# Patient Record
Sex: FEMALE | Race: WHITE | Employment: OTHER | URBAN - METROPOLITAN AREA
[De-identification: names, ages, dates, MRNs, and addresses within clinical notes are randomized per-mention and may not be internally consistent; named-entity substitution may affect disease eponyms.]

---

## 2018-02-21 PROBLEM — E11.21 TYPE 2 DIABETES WITH NEPHROPATHY (HCC): Status: ACTIVE | Noted: 2018-02-21

## 2018-04-10 PROBLEM — Z00.00 PREVENTATIVE HEALTH CARE: Status: ACTIVE | Noted: 2018-04-10

## 2018-06-25 ENCOUNTER — HOSPITAL ENCOUNTER (OUTPATIENT)
Dept: GENERAL RADIOLOGY | Age: 63
Discharge: HOME | End: 2018-06-25
Payer: COMMERCIAL

## 2018-06-25 DIAGNOSIS — R26.89 DECREASED MOBILITY: ICD-10-CM

## 2018-06-25 DIAGNOSIS — R60.9 EDEMA: ICD-10-CM

## 2018-06-25 DIAGNOSIS — R52 PAIN: ICD-10-CM

## 2018-06-25 PROCEDURE — 73560 X-RAY EXAM OF KNEE 1 OR 2: CPT

## 2018-06-25 PROCEDURE — 73502 X-RAY EXAM HIP UNI 2-3 VIEWS: CPT

## 2018-09-06 PROBLEM — D22.5 MELANOCYTIC NEVUS OF TRUNK: Status: ACTIVE | Noted: 2018-09-06

## 2018-10-31 PROBLEM — M17.0 OSTEOARTHRITIS OF BOTH KNEES: Status: ACTIVE | Noted: 2018-10-31

## 2019-01-16 ENCOUNTER — HOSPITAL ENCOUNTER (OUTPATIENT)
Dept: SURGERY | Age: 64
Discharge: HOME | End: 2019-01-16
Payer: COMMERCIAL

## 2019-01-16 VITALS
BODY MASS INDEX: 27.56 KG/M2 | HEART RATE: 71 BPM | RESPIRATION RATE: 18 BRPM | HEIGHT: 61 IN | WEIGHT: 146 LBS | SYSTOLIC BLOOD PRESSURE: 174 MMHG | OXYGEN SATURATION: 95 % | TEMPERATURE: 97.4 F | DIASTOLIC BLOOD PRESSURE: 79 MMHG

## 2019-01-16 LAB
ANION GAP SERPL CALC-SCNC: 4 MMOL/L
APPEARANCE UR: CLEAR
APTT PPP: 37 SEC (ref 24.7–39.8)
BACTERIA SPEC CULT: ABNORMAL
BACTERIA URNS QL MICRO: 0 /HPF
BASOPHILS # BLD: 0.1 K/UL (ref 0–0.2)
BASOPHILS NFR BLD: 0 % (ref 0–2)
BILIRUB UR QL: NEGATIVE
BUN SERPL-MCNC: 9 MG/DL (ref 8–23)
CALCIUM SERPL-MCNC: 9.2 MG/DL (ref 8.3–10.4)
CASTS URNS QL MICRO: ABNORMAL /LPF
CHLORIDE SERPL-SCNC: 97 MMOL/L (ref 98–107)
CO2 SERPL-SCNC: 35 MMOL/L (ref 21–32)
COLOR UR: YELLOW
CREAT SERPL-MCNC: 0.6 MG/DL (ref 0.6–1)
DIFFERENTIAL METHOD BLD: ABNORMAL
EOSINOPHIL # BLD: 0.2 K/UL (ref 0–0.8)
EOSINOPHIL NFR BLD: 1 % (ref 0.5–7.8)
EPI CELLS #/AREA URNS HPF: ABNORMAL /HPF
ERYTHROCYTE [DISTWIDTH] IN BLOOD BY AUTOMATED COUNT: 13.5 % (ref 11.9–14.6)
GLUCOSE SERPL-MCNC: 149 MG/DL (ref 65–100)
GLUCOSE UR STRIP.AUTO-MCNC: NEGATIVE MG/DL
HCT VFR BLD AUTO: 42.3 % (ref 35.8–46.3)
HGB BLD-MCNC: 13.8 G/DL (ref 11.7–15.4)
HGB UR QL STRIP: ABNORMAL
IMM GRANULOCYTES # BLD AUTO: 0 K/UL (ref 0–0.5)
IMM GRANULOCYTES NFR BLD AUTO: 0 % (ref 0–5)
INR PPP: 1
KETONES UR QL STRIP.AUTO: NEGATIVE MG/DL
LEUKOCYTE ESTERASE UR QL STRIP.AUTO: ABNORMAL
LYMPHOCYTES # BLD: 3.4 K/UL (ref 0.5–4.6)
LYMPHOCYTES NFR BLD: 25 % (ref 13–44)
MCH RBC QN AUTO: 28.9 PG (ref 26.1–32.9)
MCHC RBC AUTO-ENTMCNC: 32.6 G/DL (ref 31.4–35)
MCV RBC AUTO: 88.5 FL (ref 79.6–97.8)
MONOCYTES # BLD: 0.6 K/UL (ref 0.1–1.3)
MONOCYTES NFR BLD: 5 % (ref 4–12)
NEUTS SEG # BLD: 9.3 K/UL (ref 1.7–8.2)
NEUTS SEG NFR BLD: 68 % (ref 43–78)
NITRITE UR QL STRIP.AUTO: NEGATIVE
NRBC # BLD: 0 K/UL (ref 0–0.2)
PH UR STRIP: 6.5 [PH] (ref 5–9)
PLATELET # BLD AUTO: 431 K/UL (ref 150–450)
PMV BLD AUTO: 8.2 FL (ref 9.4–12.3)
POTASSIUM SERPL-SCNC: 3.6 MMOL/L (ref 3.5–5.1)
PROT UR STRIP-MCNC: 30 MG/DL
PROTHROMBIN TIME: 13 SEC (ref 11.7–14.5)
RBC # BLD AUTO: 4.78 M/UL (ref 4.05–5.2)
RBC #/AREA URNS HPF: ABNORMAL /HPF
SERVICE CMNT-IMP: ABNORMAL
SODIUM SERPL-SCNC: 136 MMOL/L (ref 136–145)
SP GR UR REFRACTOMETRY: 1.01 (ref 1–1.02)
UROBILINOGEN UR QL STRIP.AUTO: 1 EU/DL (ref 0.2–1)
WBC # BLD AUTO: 13.5 K/UL (ref 4.3–11.1)
WBC URNS QL MICRO: ABNORMAL /HPF

## 2019-01-16 PROCEDURE — 85610 PROTHROMBIN TIME: CPT

## 2019-01-16 PROCEDURE — 81001 URINALYSIS AUTO W/SCOPE: CPT

## 2019-01-16 PROCEDURE — 77030027138 HC INCENT SPIROMETER -A

## 2019-01-16 PROCEDURE — 85025 COMPLETE CBC W/AUTO DIFF WBC: CPT

## 2019-01-16 PROCEDURE — 80048 BASIC METABOLIC PNL TOTAL CA: CPT

## 2019-01-16 PROCEDURE — 85730 THROMBOPLASTIN TIME PARTIAL: CPT

## 2019-01-16 PROCEDURE — 87641 MR-STAPH DNA AMP PROBE: CPT

## 2019-01-16 RX ORDER — CYCLOBENZAPRINE HCL 10 MG
10 TABLET ORAL AS NEEDED
COMMUNITY
End: 2019-03-19 | Stop reason: SDUPTHER

## 2019-01-16 RX ORDER — LEVOCETIRIZINE DIHYDROCHLORIDE 5 MG/1
5 TABLET, FILM COATED ORAL
COMMUNITY
End: 2019-11-27 | Stop reason: SDUPTHER

## 2019-01-16 NOTE — PERIOP NOTES
Patient verified name and . Order for consent  found in EHR and matches case posting; patient verified. Bernadine Elliott, NP looked at open area on pt's left arm-gave instructions for using bactroban, keeping it covered. Type 3 surgery, Joint assessment complete. Labs per surgeon: cbc,bmp,pt,ptt,ua,mssa ; results pending. Labs per anesthesia protocol: no other needed. EKG: dated 10-31-18-within anesthesia limits. MRSA/MSSA swab collected; pharmacy to review and dose antibiotic as appropriate. Hibiclens and instructions to return bottle on DOS given per hospital policy. Patient provided with handouts including Guide to Surgery, Pain Management, Hand Hygiene, Blood Transfusion Education, and [facility] Brochure. Patient answered medical/surgical history questions at their best of ability. All prior to admission medications documented in [facility]. Original medication prescription bottle not visualized during patient appointment. Patient instructed to hold all vitamins 7 days prior to surgery and NSAIDS 5 days prior to surgery. Medications to be held: none. Patient instructed to continue previous medications as prescribed prior to surgery and to take the following medications the day of surgery according to anesthesia guidelines with a small sip of water: aspirin 81 mg, amlodipine, pantoprazole, alprazolam, oxycodone if needed, flexeril if needed, use albuterol inhaler. Patient teach back successful and patient demonstrates knowledge of instruction.

## 2019-01-17 NOTE — ADVANCED PRACTICE NURSE
Total Joint Surgery Preoperative Chart Review Patient ID: 
Lazaro Allison 
[id number] 
89 y.o. 
1955 Surgeon: Dr. Adalberto Jernigan Date of Surgery: 1/24/2019 Procedure: Total Left Knee Arthroplasty Primary Care Physician: Eli Curtis [phone] Specialty Physician(s):   
 
Subjective:  
Lazaro Allison is a 58 y.o. WHITE OR  female who presents for preoperative evaluation for Total Left Knee arthroplasty. This is a preoperative chart review note based on data collected by the nurse at the surgical Pre-Assessment visit. Past Medical History:  
Diagnosis Date  Angina   
 at rest in 3/2010 potassium low; denies currently  Arthritis   
 does not know kind-sees rheumatologist/ spine/ LLE/ L ft/toes; pt reports psoriatic  Bronchitis ? Aug 2013  
 no hospitalization  CAD (coronary artery disease) 2011 EF= 65 %  left anterior descending coronary stenosis is 30 %. , normal lv function  Cancer [facility])   
 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   
 spine-has congenital scoliosis-R knee  COPD   
 COPD (chronic obstructive pulmonary disease) ([facility] [address] 12/13/13  
 no use of rescue inhaler x 3 wks; denies ABDELRAHMAN  Diabetes ([facility] [address] type 2 insulin dep x 2 yrs;avg fasting BS-118-205;S/S hypo @ 90  GERD (gastroesophageal reflux disease)   
 controlled with protonix  History of colon cancer 3/15/2013 S/p partial colectomy  Hypertension   
 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  
 removed by Dr. Lisa Eaton  Mixed hyperlipidemia 3/15/2013  
 intolerant to medications  Nicotine vapor product user  Psychiatric disorder   
 depression and anxiety no med  PUD (peptic ulcer disease)   
 gastric  Renal insufficiency 3/15/2013 Has seen Dr. Jason Kumari in the past.  
 Scoliosis, congenital   
 Thyroid nodule 3/15/2013  Type II or unspecified type diabetes mellitus without mention of complication, not stated as uncontrolled 3/15/2013 Is adjusting insulin at this time. Past Surgical History:  
Procedure Laterality Date  ABDOMEN SURGERY PROC UNLISTED  1996  
 colon resection  HX BACK SURGERY  2005  
 spinal cord implant  HX BACK SURGERY  2005  
 spinal cord implant removal  
 HX BACK SURGERY  2005  
 nerve embedded in muscle-resection R wrist  
 HX CERVICAL FUSION  10/11  
 w/ discectomy and cage - Dr. Louise Pruitt [address] [facility] [address]  HX HERNIA REPAIR  1997  
 hernia repair  HX KNEE REPLACEMENT  5/11  
 partial - Dr. Shannon Spicer  HX LUMBAR FUSION  1974 or 75  
 spinal fusion lumbar  HX OOPHORECTOMY  1974  
 left partial  
 HX CHRISTIAN AND BSO  1995 Family History Problem Relation Age of Onset  Alcohol abuse Father  Alcohol abuse Mother  Lung Disease Sister  Heart Disease Sister  Lung Disease Sister  Heart Disease Brother  Diabetes Paternal Grandfather Social History Tobacco Use  Smoking status: Former Smoker Packs/day: 1.00 Years: 40.00 Pack years: 40.00 Types: Cigarettes  Smokeless tobacco: Former User Quit date: 9/16/2018  Tobacco comment: quit Substance Use Topics  Alcohol use: No  
   
Prior to Admission medications Medication Sig Start Date End Date Taking? Authorizing Provider  
cyclobenzaprine (FLEXERIL) 10 mg tablet Take 10 mg by mouth as needed for Muscle Spasm(s). Yes Provider, Historical  
levocetirizine (XYZAL) 5 mg tablet Take  by mouth nightly. Yes Provider, Historical  
albuterol (PROAIR HFA) 90 mcg/actuation inhaler Take 2 Puffs by inhalation every six (6) hours as needed for Wheezing. 12/20/18   Charlotte Freedman MD  
traZODone (DESYREL) 50 mg tablet 1-3 tabs at bedtime.  12/20/18   Charlotte Freedman MD  
metoprolol succinate (TOPROL-XL) 50 mg XL tablet TAKE 1 TABLET BY MOUTH EVERY DAY 
 Patient taking differently: nightly. TAKE 1 TABLET BY MOUTH EVERY DAY 12/5/18   Vashti Heimlich, MD  
losartan-hydroCHLOROthiazide [facility]) 50-12.5 mg per tablet 1 by mouth daily at bedtime for blood pressure Instead of the Benicar HCTZ 12/5/18   Vashti Heimlich, MD  
promethazine (PHENERGAN) 25 mg tablet Take 1 Tab by mouth every six (6) hours as needed. 12/5/18   Vashti Heimlich, MD  
pantoprazole (PROTONIX) 40 mg tablet Take 1 Tab by mouth daily. For stomach - instead of prilosec( failed priolosec and nexium and prevacid) 12/5/18   Km Schrader MD  
amLODIPine (NORVASC) 5 mg tablet Take 1 Tab by mouth daily. ( instead of tribenzor with benicar hct) 12/5/18   Vashti Heimlich, MD  
chlorhexidine (HIBICLENS) 4 % liquid clean entire body - chin to feet, then  head - face x 3 d. repeat 3 times a week x3wk 12/5/18   Vashti Heimlich, MD  
mupirocin calcium (BACTROBAN) 2 % topical cream Apply  to affected area two (2) times a day. 12/5/18   Vashti Heimlich, MD  
ALPRAZolam [facility]cca Inches) 1 mg tablet Take 1 Tab by mouth three (3) times daily as needed for Anxiety. 11/19/18   Vashti Heimlich, MD  
insulin degludec (TRESIBA FLEXTOUCH U-200) 200 unit/mL (3 mL) inpn 62 units sq daily ( 18 pens for a 90 day supply) Patient taking differently: daily. 62 units sq daily ( 18 pens for a 90 day supply) 4/11/18   Km Schrader MD  
multivitamin with iron (DAILY MULTI-VITAMINS/IRON) tablet Take 1 Tab by mouth daily. Provider, Historical  
Insulin Needles, Disposable, (ADVOCATE PEN NEEDLE) 31 gauge x 5/16\" ndle Use TID - QID for lantus, victoza, and apidra  (#300)  Dx: E11.9 DM 9/11/17   Vashti Heimlich, MD  
lancets 30 gauge misc 1 Each by SubCUTAneous route daily. Test BS bid either fasting or 2 h after eating dx:250.00 9/11/17   Vashti Heimlich, MD  
oxyCODONE IR (OXY-IR) 15 mg immediate release tablet Take 15 mg by mouth four (4) times daily as needed for Pain. Per Dr Manisha Fulton.     Provider, Historical  
 Aspirin, Buffered 81 mg tab Take  by mouth daily. Provider, Historical  
morphine CR (MS CONTIN) 60 mg CR tablet every eight (8) hours as needed. 1/2/15   Provider, Historical  
 
Allergies Allergen Reactions  Latex Rash Skin raw  Adhesive Other (comments) Bruising, burning  Arthrotec 50 [Diclofenac-Misoprostol] Nausea Only  Chantix [Varenicline] Other (comments) \"I had violent outbursts\"  Neurontin [Gabapentin] Palpitations  Nsaids (Non-Steroidal Anti-Inflammatory Drug) Other (comments) Not to take due to kidneys blood and pus in urine.  Requip [Ropinirole] Other (comments) Keep pt awake  Tylenol [Acetaminophen] Other (comments) Bothers her liver or kidneys Objective:  
 
Physical Exam:  
Patient Vitals for the past 24 hrs: 
 Temp Pulse Resp BP SpO2  
01/16/19 1419 97.4 °F (36.3 °C) 71 18 174/79 95 % ECG:   
EKG Results None Data Review:  
Labs:  
Recent Results (from the past 24 hour(s)) CBC WITH AUTOMATED DIFF Collection Time: 01/16/19  2:32 PM  
Result Value Ref Range WBC 13.5 (H) 4.3 - 11.1 K/uL  
 RBC 4.78 4.05 - 5.2 M/uL  
 HGB 13.8 11.7 - 15.4 g/dL HCT 42.3 35.8 - 46.3 % MCV 88.5 79.6 - 97.8 FL  
 MCH 28.9 26.1 - 32.9 PG  
 MCHC 32.6 31.4 - 35.0 g/dL  
 RDW 13.5 11.9 - 14.6 % PLATELET 887 763 - 120 K/uL MPV 8.2 (L) 9.4 - 12.3 FL ABSOLUTE NRBC 0.00 0.0 - 0.2 K/uL  
 DF AUTOMATED NEUTROPHILS 68 43 - 78 % LYMPHOCYTES 25 13 - 44 % MONOCYTES 5 4.0 - 12.0 % EOSINOPHILS 1 0.5 - 7.8 % BASOPHILS 0 0.0 - 2.0 % IMMATURE GRANULOCYTES 0 0.0 - 5.0 %  
 ABS. NEUTROPHILS 9.3 (H) 1.7 - 8.2 K/UL  
 ABS. LYMPHOCYTES 3.4 0.5 - 4.6 K/UL  
 ABS. MONOCYTES 0.6 0.1 - 1.3 K/UL  
 ABS. EOSINOPHILS 0.2 0.0 - 0.8 K/UL  
 ABS. BASOPHILS 0.1 0.0 - 0.2 K/UL  
 ABS. IMM. GRANS. 0.0 0.0 - 0.5 K/UL METABOLIC PANEL, BASIC Collection Time: 01/16/19  2:32 PM  
Result Value Ref Range  Sodium 136 136 - 145 mmol/L  
 Potassium 3.6 3.5 - 5.1 mmol/L Chloride 97 (L) 98 - 107 mmol/L  
 CO2 35 (H) 21 - 32 mmol/L Anion gap 4 mmol/L Glucose 149 (H) 65 - 100 mg/dL BUN 9 8 - 23 MG/DL Creatinine 0.60 0.6 - 1.0 MG/DL  
 GFR est AA >60 >60 ml/min/1.73m2 GFR est non-AA >60 ml/min/1.73m2 Calcium 9.2 8.3 - 10.4 MG/DL  
MSSA/MRSA SC BY PCR, NASAL SWAB Collection Time: 01/16/19  2:32 PM  
Result Value Ref Range Special Requests: NO SPECIAL REQUESTS Culture result: (A) MRSA target DNA not detected, SA target DNA detected. A MRSA negative, SA positive test result does not preclude MRSA nasal colonization. PROTHROMBIN TIME + INR Collection Time: 01/16/19  2:32 PM  
Result Value Ref Range Prothrombin time 13.0 11.7 - 14.5 sec INR 1.0    
PTT Collection Time: 01/16/19  2:32 PM  
Result Value Ref Range aPTT 37.0 24.7 - 39.8 SEC URINALYSIS W/ RFLX MICROSCOPIC Collection Time: 01/16/19  2:32 PM  
Result Value Ref Range Color YELLOW Appearance CLEAR Specific gravity 1.008 1.001 - 1.023    
 pH (UA) 6.5 5.0 - 9.0 Protein 30 (A) NEG mg/dL Glucose NEGATIVE  mg/dL Ketone NEGATIVE  NEG mg/dL Bilirubin NEGATIVE  NEG Blood TRACE (A) NEG Urobilinogen 1.0 0.2 - 1.0 EU/dL Nitrites NEGATIVE  NEG Leukocyte Esterase MODERATE (A) NEG    
 WBC 20-50 0 /hpf  
 RBC 0-3 0 /hpf Epithelial cells 0-3 0 /hpf Bacteria 0 0 /hpf Casts 0-3 0 /lpf Problem List: 
) Patient Active Problem List  
Diagnosis Code  Hypertension I10  
 GERD (gastroesophageal reflux disease) K21.9  PUD (peptic ulcer disease) K27.9  Insomnia G47.00  Arthritis M19.90  Chronic pain G89.29  
 CAD (coronary artery disease) I25.10  Scoliosis, congenital Q67.5  Renal insufficiency N28.9  Type II or unspecified type diabetes mellitus without mention of complication, not stated as uncontrolled E11.9  History of colon cancer Z85.038  
  Mixed hyperlipidemia E78.2  Thyroid nodule E04.1  COPD (chronic obstructive pulmonary disease) ([facility]) J44.9  Urethral stenosis HVD3302  Leiomyosarcoma of arm ([facility]) C49.10  Falls frequently R29.6  Back pain M54.9  Chronic narcotic dependence ([address] F11.20  DM (diabetes mellitus) ([facility] [address] E11.9  Leukocytosis D72.829  
 Bacteremia R78.81  
 HTN (hypertension) I10  
 Hypokalemia E87.6  MDD (major depressive disorder) F32.9  Constipation K59.00  Type 2 diabetes mellitus without complication ([facility]) U35.1  Major depressive disorder, recurrent episode, moderate ([facility]) F33.1  Depression F32.9  Tremor R25.1  Edema of both legs R60.0  Post-traumatic osteoarthritis of right knee H76.07  
 Umbilical hernia without obstruction and without gangrene K42.9  Type 2 diabetes with nephropathy ([facility] [address] E11.21  
 Preventative health care Z00.00  Cancer ([facility] [address] C80.1  Melanocytic nevus of trunk D22.5  Osteoarthritis of both knees M17.0 Total Joint Surgery Pre-Assessment Recommendations:    Patient with chronic leukocytosis since 2011. Patient was evaluated for wound to left lower FA without signs of infection. Recommend continuous saturation monitoring hours of sleep, during hospitalization. Albuterol every 6 hours as need during hospitalization. Signed By: John Terrazas NP-C  
 January 17, 2019

## 2019-01-17 NOTE — PERIOP NOTES
Reviewed labs. Noted abnormal WBC. Notified Hema Walsh NP for [facility].(see saw pt during [address] 1/16/19). Recent Results (from the past 24 hour(s)) CBC WITH AUTOMATED DIFF Collection Time: 01/16/19  2:32 PM  
Result Value Ref Range WBC 13.5 (H) 4.3 - 11.1 K/uL  
 RBC 4.78 4.05 - 5.2 M/uL  
 HGB 13.8 11.7 - 15.4 g/dL HCT 42.3 35.8 - 46.3 % MCV 88.5 79.6 - 97.8 FL  
 MCH 28.9 26.1 - 32.9 PG  
 MCHC 32.6 31.4 - 35.0 g/dL  
 RDW 13.5 11.9 - 14.6 % PLATELET 781 119 - 657 K/uL MPV 8.2 (L) 9.4 - 12.3 FL ABSOLUTE NRBC 0.00 0.0 - 0.2 K/uL  
 DF AUTOMATED NEUTROPHILS 68 43 - 78 % LYMPHOCYTES 25 13 - 44 % MONOCYTES 5 4.0 - 12.0 % EOSINOPHILS 1 0.5 - 7.8 % BASOPHILS 0 0.0 - 2.0 % IMMATURE GRANULOCYTES 0 0.0 - 5.0 %  
 ABS. NEUTROPHILS 9.3 (H) 1.7 - 8.2 K/UL  
 ABS. LYMPHOCYTES 3.4 0.5 - 4.6 K/UL  
 ABS. MONOCYTES 0.6 0.1 - 1.3 K/UL  
 ABS. EOSINOPHILS 0.2 0.0 - 0.8 K/UL  
 ABS. BASOPHILS 0.1 0.0 - 0.2 K/UL  
 ABS. IMM. GRANS. 0.0 0.0 - 0.5 K/UL METABOLIC PANEL, BASIC Collection Time: 01/16/19  2:32 PM  
Result Value Ref Range Sodium 136 136 - 145 mmol/L Potassium 3.6 3.5 - 5.1 mmol/L Chloride 97 (L) 98 - 107 mmol/L  
 CO2 35 (H) 21 - 32 mmol/L Anion gap 4 mmol/L Glucose 149 (H) 65 - 100 mg/dL BUN 9 8 - 23 MG/DL Creatinine 0.60 0.6 - 1.0 MG/DL  
 GFR est AA >60 >60 ml/min/1.73m2 GFR est non-AA >60 ml/min/1.73m2 Calcium 9.2 8.3 - 10.4 MG/DL  
MSSA/MRSA SC BY PCR, NASAL SWAB Collection Time: 01/16/19  2:32 PM  
Result Value Ref Range Special Requests: NO SPECIAL REQUESTS Culture result: (A) MRSA target DNA not detected, SA target DNA detected. A MRSA negative, SA positive test result does not preclude MRSA nasal colonization. PROTHROMBIN TIME + INR Collection Time: 01/16/19  2:32 PM  
Result Value Ref Range Prothrombin time 13.0 11.7 - 14.5 sec INR 1.0    
PTT  Collection Time: 01/16/19  2:32 PM  
 Result Value Ref Range aPTT 37.0 24.7 - 39.8 SEC URINALYSIS W/ RFLX MICROSCOPIC Collection Time: 01/16/19  2:32 PM  
Result Value Ref Range Color YELLOW Appearance CLEAR Specific gravity 1.008 1.001 - 1.023    
 pH (UA) 6.5 5.0 - 9.0 Protein 30 (A) NEG mg/dL Glucose NEGATIVE  mg/dL Ketone NEGATIVE  NEG mg/dL Bilirubin NEGATIVE  NEG Blood TRACE (A) NEG Urobilinogen 1.0 0.2 - 1.0 EU/dL Nitrites NEGATIVE  NEG Leukocyte Esterase MODERATE (A) NEG    
 WBC 20-50 0 /hpf  
 RBC 0-3 0 /hpf Epithelial cells 0-3 0 /hpf Bacteria 0 0 /hpf Casts 0-3 0 /lpf

## 2019-01-18 NOTE — H&P
[id number] [facility]  History and Physical Exam    Patient ID:  Yue Hill  [id number]    40 y.o.  1955    Today: January 18, 2019    Vitals Signs: Reviewed as noted in medical record. Allergies: Allergies   Allergen Reactions    Latex Rash     Skin raw    Adhesive Other (comments)     Bruising, burning    Arthrotec 50 [Diclofenac-Misoprostol] Nausea Only    Chantix [Varenicline] Other (comments)     \"I had violent outbursts\"    Neurontin [Gabapentin] Palpitations    Nsaids (Non-Steroidal Anti-Inflammatory Drug) Other (comments)     Not to take due to kidneys blood and pus in urine.  Requip [Ropinirole] Other (comments)     Keep pt awake    Tylenol [Acetaminophen] Other (comments)     Bothers her liver or kidneys       CC: left knee pain    HPI:  Pt complains of left knee pain and with difficulty ambulating. Relevant PMH:   Past Medical History:   Diagnosis Date    Angina     at rest in 3/2010 potassium low; denies currently    Arthritis     does not know kind-sees rheumatologist/ spine/ LLE/ L ft/toes; pt reports psoriatic    Bronchitis ? Aug 2013    no hospitalization    CAD (coronary artery disease) 2011    EF= 65 %  left anterior descending coronary stenosis is 30 %. 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    Chronic pain     spine-has congenital scoliosis-R knee    COPD     COPD (chronic obstructive pulmonary disease) ([facility]) 12/13/13    no use of rescue inhaler x 3 wks; denies ABDELRAHMAN    Diabetes ([facility]     type 2 insulin dep x 2 yrs;avg fasting BS-118-205;S/S hypo @ 80    GERD (gastroesophageal reflux disease)     controlled with protonix    History of colon cancer 3/15/2013    S/p partial colectomy    Hypertension     controlled with med    Insomnia     Leiomyosarcoma of arm ([facility] [address] 1998    removed by Dr. Walker Romero Mixed hyperlipidemia 3/15/2013    intolerant to medications    Nicotine vapor product user     Psychiatric disorder     depression and anxiety no med    PUD (peptic ulcer disease)     gastric    Renal insufficiency 3/15/2013    Has seen Dr. Chantale Graves in the past.    Scoliosis, congenital     Thyroid nodule 3/15/2013    Type II or unspecified type diabetes mellitus without mention of complication, not stated as uncontrolled 3/15/2013      Is adjusting insulin at this time. Objective:                    HEENT: NC/AT                   Lungs:  clear                   Heart:   rrr                   Abdomen: soft                   Extremities:  Pain with rom of the left knee pain    Radiographs: reveal osteoarthritis with loss of joint space and bone spurs. Assessment: Unilateral primary osteoarthritis, left knee [M17.12]    Plan:  Proceed with scheduled Procedure(s) (LRB):  LEFT KNEE ARTHROPLASTY TOTAL / MANDIE (Left) . The patient has failed conservative treatment including NSAIDS, and injections. Due to the amount of pain the patient is experiencing we will proceed with scheduled procedure. It is also felt that the patient is high risk for postoperative complications due to history of multiple chronic medical problems.       Signed By: PALOMA Aguila  January 18, 2019

## 2019-01-23 ENCOUNTER — ANESTHESIA EVENT (OUTPATIENT)
Dept: SURGERY | Age: 64
DRG: 470 | End: 2019-01-23
Payer: COMMERCIAL

## 2019-01-24 ENCOUNTER — HOSPITAL ENCOUNTER (INPATIENT)
Age: 64
LOS: 1 days | Discharge: HOME HEALTH | DRG: 470 | End: 2019-01-25
Attending: ORTHOPAEDIC SURGERY | Admitting: ORTHOPAEDIC SURGERY
Payer: COMMERCIAL

## 2019-01-24 ENCOUNTER — ANESTHESIA (OUTPATIENT)
Dept: SURGERY | Age: 64
DRG: 470 | End: 2019-01-24
Payer: COMMERCIAL

## 2019-01-24 ENCOUNTER — HOME HEALTH ADMISSION (OUTPATIENT)
Dept: HOME HEALTH SERVICES | Facility: HOME HEALTH | Age: 64
End: 2019-01-24
Payer: COMMERCIAL

## 2019-01-24 PROBLEM — M17.12 ARTHRITIS OF LEFT KNEE: Status: ACTIVE | Noted: 2019-01-24

## 2019-01-24 LAB
ABO + RH BLD: NORMAL
BLOOD GROUP ANTIBODIES SERPL: NORMAL
GLUCOSE BLD STRIP.AUTO-MCNC: 321 MG/DL (ref 65–100)
GLUCOSE BLD STRIP.AUTO-MCNC: 92 MG/DL (ref 65–100)
HGB BLD-MCNC: 12.4 G/DL (ref 11.7–15.4)
SPECIMEN EXP DATE BLD: NORMAL

## 2019-01-24 PROCEDURE — 77030002933 HC SUT MCRYL J&J -A: Performed by: ORTHOPAEDIC SURGERY

## 2019-01-24 PROCEDURE — C1713 ANCHOR/SCREW BN/BN,TIS/BN: HCPCS | Performed by: ORTHOPAEDIC SURGERY

## 2019-01-24 PROCEDURE — 77030013708 HC HNDPC SUC IRR PULS STRY –B: Performed by: ORTHOPAEDIC SURGERY

## 2019-01-24 PROCEDURE — 74011250636 HC RX REV CODE- 250/636

## 2019-01-24 PROCEDURE — 76010010054 HC POST OP PAIN BLOCK: Performed by: ORTHOPAEDIC SURGERY

## 2019-01-24 PROCEDURE — 74011000250 HC RX REV CODE- 250: Performed by: ORTHOPAEDIC SURGERY

## 2019-01-24 PROCEDURE — 77030020256 HC SOL INJ NACL 0.9%  500ML: Performed by: ORTHOPAEDIC SURGERY

## 2019-01-24 PROCEDURE — 76210000017 HC OR PH I REC 1.5 TO 2 HR: Performed by: ORTHOPAEDIC SURGERY

## 2019-01-24 PROCEDURE — 94760 N-INVAS EAR/PLS OXIMETRY 1: CPT

## 2019-01-24 PROCEDURE — 77030034696 HC CATH URETH FOL 2W BARD -A: Performed by: ORTHOPAEDIC SURGERY

## 2019-01-24 PROCEDURE — 77030006720 HC BLD PAT RMR ZIMM -B: Performed by: ORTHOPAEDIC SURGERY

## 2019-01-24 PROCEDURE — 74011250637 HC RX REV CODE- 250/637: Performed by: ORTHOPAEDIC SURGERY

## 2019-01-24 PROCEDURE — 77030002966 HC SUT PDS J&J -A: Performed by: ORTHOPAEDIC SURGERY

## 2019-01-24 PROCEDURE — 77030019557 HC ELECTRD VES SEAL MEDT -F: Performed by: ORTHOPAEDIC SURGERY

## 2019-01-24 PROCEDURE — 97165 OT EVAL LOW COMPLEX 30 MIN: CPT

## 2019-01-24 PROCEDURE — 77030031139 HC SUT VCRL2 J&J -A: Performed by: ORTHOPAEDIC SURGERY

## 2019-01-24 PROCEDURE — 74011250636 HC RX REV CODE- 250/636: Performed by: ORTHOPAEDIC SURGERY

## 2019-01-24 PROCEDURE — 77030011208: Performed by: ORTHOPAEDIC SURGERY

## 2019-01-24 PROCEDURE — 82962 GLUCOSE BLOOD TEST: CPT

## 2019-01-24 PROCEDURE — 77010033678 HC OXYGEN DAILY

## 2019-01-24 PROCEDURE — 74011250637 HC RX REV CODE- 250/637: Performed by: PHYSICIAN ASSISTANT

## 2019-01-24 PROCEDURE — 76010000162 HC OR TIME 1.5 TO 2 HR INTENSV-TIER 1: Performed by: ORTHOPAEDIC SURGERY

## 2019-01-24 PROCEDURE — 76210000021 HC REC RM PH II 0.5 TO 1 HR: Performed by: ORTHOPAEDIC SURGERY

## 2019-01-24 PROCEDURE — 65270000029 HC RM PRIVATE

## 2019-01-24 PROCEDURE — 97110 THERAPEUTIC EXERCISES: CPT

## 2019-01-24 PROCEDURE — 76060000034 HC ANESTHESIA 1.5 TO 2 HR: Performed by: ORTHOPAEDIC SURGERY

## 2019-01-24 PROCEDURE — 74011636637 HC RX REV CODE- 636/637: Performed by: HOSPITALIST

## 2019-01-24 PROCEDURE — 77030020782 HC GWN BAIR PAWS FLX 3M -B: Performed by: NURSE ANESTHETIST, CERTIFIED REGISTERED

## 2019-01-24 PROCEDURE — C1776 JOINT DEVICE (IMPLANTABLE): HCPCS | Performed by: ORTHOPAEDIC SURGERY

## 2019-01-24 PROCEDURE — 77030034849: Performed by: ORTHOPAEDIC SURGERY

## 2019-01-24 PROCEDURE — 77030008703 HC TU ET UNCUF COVD -A: Performed by: ANESTHESIOLOGY

## 2019-01-24 PROCEDURE — 85018 HEMOGLOBIN: CPT

## 2019-01-24 PROCEDURE — 74011000258 HC RX REV CODE- 258: Performed by: ORTHOPAEDIC SURGERY

## 2019-01-24 PROCEDURE — 77030008467 HC STPLR SKN COVD -B: Performed by: ORTHOPAEDIC SURGERY

## 2019-01-24 PROCEDURE — 74011000302 HC RX REV CODE- 302: Performed by: ORTHOPAEDIC SURGERY

## 2019-01-24 PROCEDURE — 0SRD0JZ REPLACEMENT OF LEFT KNEE JOINT WITH SYNTHETIC SUBSTITUTE, OPEN APPROACH: ICD-10-PCS | Performed by: ORTHOPAEDIC SURGERY

## 2019-01-24 PROCEDURE — 94762 N-INVAS EAR/PLS OXIMTRY CONT: CPT

## 2019-01-24 PROCEDURE — 77030006835 HC BLD SAW SAG STRY -B: Performed by: ORTHOPAEDIC SURGERY

## 2019-01-24 PROCEDURE — 76942 ECHO GUIDE FOR BIOPSY: CPT | Performed by: ORTHOPAEDIC SURGERY

## 2019-01-24 PROCEDURE — 74011250636 HC RX REV CODE- 250/636: Performed by: ANESTHESIOLOGY

## 2019-01-24 PROCEDURE — 74011000250 HC RX REV CODE- 250

## 2019-01-24 PROCEDURE — 77030012935 HC DRSG AQUACEL BMS -B: Performed by: ORTHOPAEDIC SURGERY

## 2019-01-24 PROCEDURE — 77030039425 HC BLD LARYNG TRULITE DISP TELE -A: Performed by: ANESTHESIOLOGY

## 2019-01-24 PROCEDURE — 77030003602 HC NDL NRV BLK BBMI -B: Performed by: NURSE ANESTHETIST, CERTIFIED REGISTERED

## 2019-01-24 PROCEDURE — 86900 BLOOD TYPING SEROLOGIC ABO: CPT

## 2019-01-24 PROCEDURE — 74011250636 HC RX REV CODE- 250/636: Performed by: PHYSICIAN ASSISTANT

## 2019-01-24 PROCEDURE — 74011250637 HC RX REV CODE- 250/637: Performed by: ANESTHESIOLOGY

## 2019-01-24 PROCEDURE — 36415 COLL VENOUS BLD VENIPUNCTURE: CPT

## 2019-01-24 PROCEDURE — 86580 TB INTRADERMAL TEST: CPT | Performed by: ORTHOPAEDIC SURGERY

## 2019-01-24 PROCEDURE — 77030018836 HC SOL IRR NACL ICUM -A: Performed by: ORTHOPAEDIC SURGERY

## 2019-01-24 PROCEDURE — 97161 PT EVAL LOW COMPLEX 20 MIN: CPT

## 2019-01-24 PROCEDURE — 77030008477 HC STYL SATN SLP COVD -A: Performed by: ANESTHESIOLOGY

## 2019-01-24 DEVICE — COMPNT FEM PS CEM TRIATHLN 3 L -- TRIATHLON: Type: IMPLANTABLE DEVICE | Site: KNEE | Status: FUNCTIONAL

## 2019-01-24 DEVICE — (D)CEMENT BNE HV R 40GM -- DUPE USE ITEM 353850: Type: IMPLANTABLE DEVICE | Site: KNEE | Status: FUNCTIONAL

## 2019-01-24 DEVICE — COMPONENT PAT DIA31MM THK9MM KNEE SYMMETRIC NP PRI CEM W/O: Type: IMPLANTABLE DEVICE | Site: KNEE | Status: FUNCTIONAL

## 2019-01-24 DEVICE — IMPLANTABLE DEVICE: Type: IMPLANTABLE DEVICE | Site: KNEE | Status: FUNCTIONAL

## 2019-01-24 DEVICE — BASEPLT TIB TRIATHLON SZ2 --: Type: IMPLANTABLE DEVICE | Site: KNEE | Status: FUNCTIONAL

## 2019-01-24 RX ORDER — ASPIRIN 81 MG/1
81 TABLET ORAL EVERY 12 HOURS
Status: DISCONTINUED | OUTPATIENT
Start: 2019-01-24 | End: 2019-01-25 | Stop reason: HOSPADM

## 2019-01-24 RX ORDER — PROPOFOL 10 MG/ML
INJECTION, EMULSION INTRAVENOUS AS NEEDED
Status: DISCONTINUED | OUTPATIENT
Start: 2019-01-24 | End: 2019-01-24 | Stop reason: HOSPADM

## 2019-01-24 RX ORDER — CEFAZOLIN SODIUM/WATER 2 G/20 ML
2 SYRINGE (ML) INTRAVENOUS EVERY 8 HOURS
Status: COMPLETED | OUTPATIENT
Start: 2019-01-24 | End: 2019-01-25

## 2019-01-24 RX ORDER — ROPIVACAINE HYDROCHLORIDE 2 MG/ML
INJECTION, SOLUTION EPIDURAL; INFILTRATION; PERINEURAL
Status: COMPLETED | OUTPATIENT
Start: 2019-01-24 | End: 2019-01-24

## 2019-01-24 RX ORDER — OXYCODONE HYDROCHLORIDE 5 MG/1
5 TABLET ORAL
Status: DISCONTINUED | OUTPATIENT
Start: 2019-01-24 | End: 2019-01-24 | Stop reason: HOSPADM

## 2019-01-24 RX ORDER — ROCURONIUM BROMIDE 10 MG/ML
INJECTION, SOLUTION INTRAVENOUS AS NEEDED
Status: DISCONTINUED | OUTPATIENT
Start: 2019-01-24 | End: 2019-01-24 | Stop reason: HOSPADM

## 2019-01-24 RX ORDER — LIDOCAINE HYDROCHLORIDE 10 MG/ML
0.1 INJECTION INFILTRATION; PERINEURAL AS NEEDED
Status: DISCONTINUED | OUTPATIENT
Start: 2019-01-24 | End: 2019-01-24 | Stop reason: HOSPADM

## 2019-01-24 RX ORDER — MAG HYDROX/ALUMINUM HYD/SIMETH 200-200-20
30 SUSPENSION, ORAL (FINAL DOSE FORM) ORAL
Status: DISCONTINUED | OUTPATIENT
Start: 2019-01-24 | End: 2019-01-25 | Stop reason: HOSPADM

## 2019-01-24 RX ORDER — DEXTROSE 50 % IN WATER (D50W) INTRAVENOUS SYRINGE
25-50 AS NEEDED
Status: DISCONTINUED | OUTPATIENT
Start: 2019-01-24 | End: 2019-01-25 | Stop reason: HOSPADM

## 2019-01-24 RX ORDER — CELECOXIB 200 MG/1
200 CAPSULE ORAL ONCE
Status: DISCONTINUED | OUTPATIENT
Start: 2019-01-24 | End: 2019-01-24 | Stop reason: HOSPADM

## 2019-01-24 RX ORDER — LIDOCAINE HYDROCHLORIDE 20 MG/ML
INJECTION, SOLUTION EPIDURAL; INFILTRATION; INTRACAUDAL; PERINEURAL AS NEEDED
Status: DISCONTINUED | OUTPATIENT
Start: 2019-01-24 | End: 2019-01-24 | Stop reason: HOSPADM

## 2019-01-24 RX ORDER — HYDROMORPHONE HYDROCHLORIDE 2 MG/ML
1 INJECTION, SOLUTION INTRAMUSCULAR; INTRAVENOUS; SUBCUTANEOUS
Status: DISCONTINUED | OUTPATIENT
Start: 2019-01-24 | End: 2019-01-25 | Stop reason: HOSPADM

## 2019-01-24 RX ORDER — FENTANYL CITRATE 50 UG/ML
INJECTION, SOLUTION INTRAMUSCULAR; INTRAVENOUS AS NEEDED
Status: DISCONTINUED | OUTPATIENT
Start: 2019-01-24 | End: 2019-01-24 | Stop reason: HOSPADM

## 2019-01-24 RX ORDER — SODIUM CHLORIDE, SODIUM LACTATE, POTASSIUM CHLORIDE, CALCIUM CHLORIDE 600; 310; 30; 20 MG/100ML; MG/100ML; MG/100ML; MG/100ML
75 INJECTION, SOLUTION INTRAVENOUS CONTINUOUS
Status: DISCONTINUED | OUTPATIENT
Start: 2019-01-24 | End: 2019-01-24 | Stop reason: HOSPADM

## 2019-01-24 RX ORDER — ALBUTEROL SULFATE 0.83 MG/ML
2.5 SOLUTION RESPIRATORY (INHALATION)
Status: DISCONTINUED | OUTPATIENT
Start: 2019-01-24 | End: 2019-01-25 | Stop reason: HOSPADM

## 2019-01-24 RX ORDER — ACETAMINOPHEN 10 MG/ML
1000 INJECTION, SOLUTION INTRAVENOUS ONCE
Status: DISPENSED | OUTPATIENT
Start: 2019-01-24 | End: 2019-01-25

## 2019-01-24 RX ORDER — ONDANSETRON 4 MG/1
4 TABLET, ORALLY DISINTEGRATING ORAL
Status: DISCONTINUED | OUTPATIENT
Start: 2019-01-24 | End: 2019-01-25 | Stop reason: HOSPADM

## 2019-01-24 RX ORDER — NEOSTIGMINE METHYLSULFATE 1 MG/ML
INJECTION INTRAVENOUS AS NEEDED
Status: DISCONTINUED | OUTPATIENT
Start: 2019-01-24 | End: 2019-01-24 | Stop reason: HOSPADM

## 2019-01-24 RX ORDER — MORPHINE SULFATE 30 MG/1
60 TABLET, FILM COATED, EXTENDED RELEASE ORAL EVERY 12 HOURS
Status: DISCONTINUED | OUTPATIENT
Start: 2019-01-24 | End: 2019-01-25 | Stop reason: HOSPADM

## 2019-01-24 RX ORDER — SODIUM CHLORIDE 0.9 % (FLUSH) 0.9 %
5-40 SYRINGE (ML) INJECTION EVERY 8 HOURS
Status: DISCONTINUED | OUTPATIENT
Start: 2019-01-24 | End: 2019-01-25 | Stop reason: HOSPADM

## 2019-01-24 RX ORDER — NEOMYCIN AND POLYMYXIN B SULFATES 40; 200000 MG/ML; [USP'U]/ML
SOLUTION IRRIGATION AS NEEDED
Status: DISCONTINUED | OUTPATIENT
Start: 2019-01-24 | End: 2019-01-24 | Stop reason: HOSPADM

## 2019-01-24 RX ORDER — OXYCODONE HYDROCHLORIDE 5 MG/1
10 TABLET ORAL
Status: DISCONTINUED | OUTPATIENT
Start: 2019-01-24 | End: 2019-01-25 | Stop reason: HOSPADM

## 2019-01-24 RX ORDER — ONDANSETRON 2 MG/ML
INJECTION INTRAMUSCULAR; INTRAVENOUS AS NEEDED
Status: DISCONTINUED | OUTPATIENT
Start: 2019-01-24 | End: 2019-01-24 | Stop reason: HOSPADM

## 2019-01-24 RX ORDER — HYDRALAZINE HYDROCHLORIDE 20 MG/ML
10 INJECTION INTRAMUSCULAR; INTRAVENOUS
Status: DISCONTINUED | OUTPATIENT
Start: 2019-01-24 | End: 2019-01-25 | Stop reason: HOSPADM

## 2019-01-24 RX ORDER — ZOLPIDEM TARTRATE 5 MG/1
5 TABLET ORAL
Status: DISCONTINUED | OUTPATIENT
Start: 2019-01-24 | End: 2019-01-25 | Stop reason: HOSPADM

## 2019-01-24 RX ORDER — SODIUM CHLORIDE 9 MG/ML
100 INJECTION, SOLUTION INTRAVENOUS CONTINUOUS
Status: DISCONTINUED | OUTPATIENT
Start: 2019-01-24 | End: 2019-01-25 | Stop reason: HOSPADM

## 2019-01-24 RX ORDER — ACETAMINOPHEN 500 MG
1000 TABLET ORAL EVERY 6 HOURS
Status: DISCONTINUED | OUTPATIENT
Start: 2019-01-25 | End: 2019-01-25

## 2019-01-24 RX ORDER — INSULIN GLARGINE 100 [IU]/ML
45 INJECTION, SOLUTION SUBCUTANEOUS DAILY
Status: DISCONTINUED | OUTPATIENT
Start: 2019-01-25 | End: 2019-01-25 | Stop reason: HOSPADM

## 2019-01-24 RX ORDER — GLYCOPYRROLATE 0.2 MG/ML
INJECTION INTRAMUSCULAR; INTRAVENOUS AS NEEDED
Status: DISCONTINUED | OUTPATIENT
Start: 2019-01-24 | End: 2019-01-24 | Stop reason: HOSPADM

## 2019-01-24 RX ORDER — NALOXONE HYDROCHLORIDE 0.4 MG/ML
.2-.4 INJECTION, SOLUTION INTRAMUSCULAR; INTRAVENOUS; SUBCUTANEOUS
Status: DISCONTINUED | OUTPATIENT
Start: 2019-01-24 | End: 2019-01-25 | Stop reason: HOSPADM

## 2019-01-24 RX ORDER — VANCOMYCIN HYDROCHLORIDE 1 G/20ML
INJECTION, POWDER, LYOPHILIZED, FOR SOLUTION INTRAVENOUS AS NEEDED
Status: DISCONTINUED | OUTPATIENT
Start: 2019-01-24 | End: 2019-01-24 | Stop reason: HOSPADM

## 2019-01-24 RX ORDER — INSULIN GLARGINE 100 [IU]/ML
49 INJECTION, SOLUTION SUBCUTANEOUS DAILY
Status: DISCONTINUED | OUTPATIENT
Start: 2019-01-25 | End: 2019-01-24 | Stop reason: DRUGHIGH

## 2019-01-24 RX ORDER — KETAMINE HYDROCHLORIDE 100 MG/ML
INJECTION, SOLUTION INTRAMUSCULAR; INTRAVENOUS AS NEEDED
Status: DISCONTINUED | OUTPATIENT
Start: 2019-01-24 | End: 2019-01-24 | Stop reason: HOSPADM

## 2019-01-24 RX ORDER — ALBUTEROL SULFATE 90 UG/1
2 AEROSOL, METERED RESPIRATORY (INHALATION)
Status: DISCONTINUED | OUTPATIENT
Start: 2019-01-24 | End: 2019-01-24 | Stop reason: RX

## 2019-01-24 RX ORDER — SODIUM CHLORIDE, SODIUM LACTATE, POTASSIUM CHLORIDE, CALCIUM CHLORIDE 600; 310; 30; 20 MG/100ML; MG/100ML; MG/100ML; MG/100ML
INJECTION, SOLUTION INTRAVENOUS
Status: DISCONTINUED | OUTPATIENT
Start: 2019-01-24 | End: 2019-01-24 | Stop reason: HOSPADM

## 2019-01-24 RX ORDER — INSULIN LISPRO 100 [IU]/ML
INJECTION, SOLUTION INTRAVENOUS; SUBCUTANEOUS
Status: DISCONTINUED | OUTPATIENT
Start: 2019-01-24 | End: 2019-01-25 | Stop reason: HOSPADM

## 2019-01-24 RX ORDER — DEXAMETHASONE SODIUM PHOSPHATE 100 MG/10ML
10 INJECTION INTRAMUSCULAR; INTRAVENOUS ONCE
Status: DISCONTINUED | OUTPATIENT
Start: 2019-01-25 | End: 2019-01-25 | Stop reason: HOSPADM

## 2019-01-24 RX ORDER — SODIUM CHLORIDE, SODIUM LACTATE, POTASSIUM CHLORIDE, CALCIUM CHLORIDE 600; 310; 30; 20 MG/100ML; MG/100ML; MG/100ML; MG/100ML
100 INJECTION, SOLUTION INTRAVENOUS CONTINUOUS
Status: DISCONTINUED | OUTPATIENT
Start: 2019-01-24 | End: 2019-01-24 | Stop reason: HOSPADM

## 2019-01-24 RX ORDER — FENTANYL CITRATE 50 UG/ML
100 INJECTION, SOLUTION INTRAMUSCULAR; INTRAVENOUS ONCE
Status: COMPLETED | OUTPATIENT
Start: 2019-01-24 | End: 2019-01-24

## 2019-01-24 RX ORDER — AMLODIPINE BESYLATE 5 MG/1
5 TABLET ORAL DAILY
Status: DISCONTINUED | OUTPATIENT
Start: 2019-01-25 | End: 2019-01-25 | Stop reason: HOSPADM

## 2019-01-24 RX ORDER — ALPRAZOLAM 0.5 MG/1
1 TABLET ORAL
Status: DISCONTINUED | OUTPATIENT
Start: 2019-01-24 | End: 2019-01-25 | Stop reason: HOSPADM

## 2019-01-24 RX ORDER — HYDRALAZINE HYDROCHLORIDE 20 MG/ML
INJECTION INTRAMUSCULAR; INTRAVENOUS AS NEEDED
Status: DISCONTINUED | OUTPATIENT
Start: 2019-01-24 | End: 2019-01-24 | Stop reason: HOSPADM

## 2019-01-24 RX ORDER — CEFAZOLIN SODIUM/WATER 2 G/20 ML
2 SYRINGE (ML) INTRAVENOUS ONCE
Status: COMPLETED | OUTPATIENT
Start: 2019-01-24 | End: 2019-01-24

## 2019-01-24 RX ORDER — MIDAZOLAM HYDROCHLORIDE 1 MG/ML
2 INJECTION, SOLUTION INTRAMUSCULAR; INTRAVENOUS ONCE
Status: COMPLETED | OUTPATIENT
Start: 2019-01-24 | End: 2019-01-24

## 2019-01-24 RX ORDER — AMOXICILLIN 250 MG
2 CAPSULE ORAL DAILY
Status: DISCONTINUED | OUTPATIENT
Start: 2019-01-25 | End: 2019-01-25 | Stop reason: HOSPADM

## 2019-01-24 RX ORDER — HYDROMORPHONE HYDROCHLORIDE 2 MG/ML
0.5 INJECTION, SOLUTION INTRAMUSCULAR; INTRAVENOUS; SUBCUTANEOUS
Status: DISCONTINUED | OUTPATIENT
Start: 2019-01-24 | End: 2019-01-24 | Stop reason: HOSPADM

## 2019-01-24 RX ORDER — DEXTROSE 40 %
15 GEL (GRAM) ORAL AS NEEDED
Status: DISCONTINUED | OUTPATIENT
Start: 2019-01-24 | End: 2019-01-25 | Stop reason: HOSPADM

## 2019-01-24 RX ORDER — METOPROLOL SUCCINATE 50 MG/1
50 TABLET, EXTENDED RELEASE ORAL DAILY
Status: DISCONTINUED | OUTPATIENT
Start: 2019-01-25 | End: 2019-01-25 | Stop reason: HOSPADM

## 2019-01-24 RX ORDER — ROPIVACAINE HYDROCHLORIDE 2 MG/ML
INJECTION, SOLUTION EPIDURAL; INFILTRATION; PERINEURAL AS NEEDED
Status: DISCONTINUED | OUTPATIENT
Start: 2019-01-24 | End: 2019-01-24 | Stop reason: HOSPADM

## 2019-01-24 RX ORDER — SODIUM CHLORIDE 0.9 % (FLUSH) 0.9 %
5-40 SYRINGE (ML) INJECTION AS NEEDED
Status: DISCONTINUED | OUTPATIENT
Start: 2019-01-24 | End: 2019-01-25 | Stop reason: HOSPADM

## 2019-01-24 RX ORDER — DIPHENHYDRAMINE HCL 25 MG
25 CAPSULE ORAL
Status: DISCONTINUED | OUTPATIENT
Start: 2019-01-24 | End: 2019-01-25 | Stop reason: HOSPADM

## 2019-01-24 RX ORDER — PANTOPRAZOLE SODIUM 40 MG/1
40 TABLET, DELAYED RELEASE ORAL
Status: DISCONTINUED | OUTPATIENT
Start: 2019-01-25 | End: 2019-01-25 | Stop reason: HOSPADM

## 2019-01-24 RX ORDER — NALOXONE HYDROCHLORIDE 0.4 MG/ML
0.2 INJECTION, SOLUTION INTRAMUSCULAR; INTRAVENOUS; SUBCUTANEOUS AS NEEDED
Status: DISCONTINUED | OUTPATIENT
Start: 2019-01-24 | End: 2019-01-24 | Stop reason: HOSPADM

## 2019-01-24 RX ORDER — DEXAMETHASONE SODIUM PHOSPHATE 4 MG/ML
INJECTION, SOLUTION INTRA-ARTICULAR; INTRALESIONAL; INTRAMUSCULAR; INTRAVENOUS; SOFT TISSUE AS NEEDED
Status: DISCONTINUED | OUTPATIENT
Start: 2019-01-24 | End: 2019-01-24 | Stop reason: HOSPADM

## 2019-01-24 RX ADMIN — HYDROMORPHONE HYDROCHLORIDE 1 MG: 2 INJECTION, SOLUTION INTRAMUSCULAR; INTRAVENOUS; SUBCUTANEOUS at 18:19

## 2019-01-24 RX ADMIN — TUBERCULIN PURIFIED PROTEIN DERIVATIVE 5 UNITS: 5 INJECTION, SOLUTION INTRADERMAL at 09:33

## 2019-01-24 RX ADMIN — ROPIVACAINE HYDROCHLORIDE 20 MG: 2 INJECTION, SOLUTION EPIDURAL; INFILTRATION; PERINEURAL at 11:01

## 2019-01-24 RX ADMIN — ONDANSETRON 4 MG: 2 INJECTION INTRAMUSCULAR; INTRAVENOUS at 11:38

## 2019-01-24 RX ADMIN — SODIUM CHLORIDE 100 ML/HR: 900 INJECTION, SOLUTION INTRAVENOUS at 16:44

## 2019-01-24 RX ADMIN — ROCURONIUM BROMIDE 10 MG: 10 INJECTION, SOLUTION INTRAVENOUS at 12:33

## 2019-01-24 RX ADMIN — INSULIN LISPRO 8 UNITS: 100 INJECTION, SOLUTION INTRAVENOUS; SUBCUTANEOUS at 22:28

## 2019-01-24 RX ADMIN — LIDOCAINE HYDROCHLORIDE 80 MG: 20 INJECTION, SOLUTION EPIDURAL; INFILTRATION; INTRACAUDAL; PERINEURAL at 11:36

## 2019-01-24 RX ADMIN — Medication 1 AMPULE: at 20:43

## 2019-01-24 RX ADMIN — VANCOMYCIN HYDROCHLORIDE 1000 MG: 1 INJECTION, POWDER, LYOPHILIZED, FOR SOLUTION INTRAVENOUS at 10:07

## 2019-01-24 RX ADMIN — HYDROMORPHONE HYDROCHLORIDE 1 MG: 2 INJECTION, SOLUTION INTRAMUSCULAR; INTRAVENOUS; SUBCUTANEOUS at 21:38

## 2019-01-24 RX ADMIN — NEOSTIGMINE METHYLSULFATE 3 MG: 1 INJECTION INTRAVENOUS at 13:07

## 2019-01-24 RX ADMIN — Medication 5 ML: at 16:44

## 2019-01-24 RX ADMIN — PROPOFOL 140 MG: 10 INJECTION, EMULSION INTRAVENOUS at 11:36

## 2019-01-24 RX ADMIN — KETAMINE HYDROCHLORIDE 30 MG: 100 INJECTION, SOLUTION INTRAMUSCULAR; INTRAVENOUS at 11:36

## 2019-01-24 RX ADMIN — KETAMINE HYDROCHLORIDE 15 MG: 100 INJECTION, SOLUTION INTRAMUSCULAR; INTRAVENOUS at 12:45

## 2019-01-24 RX ADMIN — ALPRAZOLAM 1 MG: 0.5 TABLET ORAL at 18:20

## 2019-01-24 RX ADMIN — HYDROMORPHONE HYDROCHLORIDE 1 MG: 2 INJECTION, SOLUTION INTRAMUSCULAR; INTRAVENOUS; SUBCUTANEOUS at 15:17

## 2019-01-24 RX ADMIN — GLYCOPYRROLATE 0.4 MG: 0.2 INJECTION INTRAMUSCULAR; INTRAVENOUS at 13:07

## 2019-01-24 RX ADMIN — OXYCODONE HYDROCHLORIDE 10 MG: 5 TABLET ORAL at 17:20

## 2019-01-24 RX ADMIN — MORPHINE SULFATE 60 MG: 30 TABLET, FILM COATED, EXTENDED RELEASE ORAL at 20:43

## 2019-01-24 RX ADMIN — FENTANYL CITRATE 100 MCG: 50 INJECTION, SOLUTION INTRAMUSCULAR; INTRAVENOUS at 11:36

## 2019-01-24 RX ADMIN — PROPOFOL 60 MG: 10 INJECTION, EMULSION INTRAVENOUS at 12:07

## 2019-01-24 RX ADMIN — SODIUM CHLORIDE, SODIUM LACTATE, POTASSIUM CHLORIDE, CALCIUM CHLORIDE: 600; 310; 30; 20 INJECTION, SOLUTION INTRAVENOUS at 11:37

## 2019-01-24 RX ADMIN — DEXAMETHASONE SODIUM PHOSPHATE 10 MG: 4 INJECTION, SOLUTION INTRA-ARTICULAR; INTRALESIONAL; INTRAMUSCULAR; INTRAVENOUS; SOFT TISSUE at 11:38

## 2019-01-24 RX ADMIN — Medication 2 G: at 19:28

## 2019-01-24 RX ADMIN — FENTANYL CITRATE 100 MCG: 50 INJECTION INTRAMUSCULAR; INTRAVENOUS at 10:58

## 2019-01-24 RX ADMIN — Medication 2 G: at 11:32

## 2019-01-24 RX ADMIN — MIDAZOLAM 2 MG: 1 INJECTION INTRAMUSCULAR; INTRAVENOUS at 10:58

## 2019-01-24 RX ADMIN — SODIUM CHLORIDE, SODIUM LACTATE, POTASSIUM CHLORIDE, AND CALCIUM CHLORIDE 100 ML/HR: 600; 310; 30; 20 INJECTION, SOLUTION INTRAVENOUS at 09:31

## 2019-01-24 RX ADMIN — ZOLPIDEM TARTRATE 5 MG: 5 TABLET, COATED ORAL at 22:36

## 2019-01-24 RX ADMIN — HYDRALAZINE HYDROCHLORIDE 10 MG: 20 INJECTION INTRAMUSCULAR; INTRAVENOUS at 12:10

## 2019-01-24 RX ADMIN — ROCURONIUM BROMIDE 40 MG: 10 INJECTION, SOLUTION INTRAVENOUS at 11:36

## 2019-01-24 RX ADMIN — Medication 3 AMPULE: at 09:32

## 2019-01-24 RX ADMIN — OXYCODONE HYDROCHLORIDE 10 MG: 5 TABLET ORAL at 23:29

## 2019-01-24 NOTE — ANESTHESIA POSTPROCEDURE EVALUATION
Procedure(s): LEFT KNEE ARTHROPLASTY TOTAL / MANDIE. Anesthesia Post Evaluation Multimodal analgesia: multimodal analgesia used between 6 hours prior to anesthesia start to PACU discharge Patient location during evaluation: bedside Patient participation: complete - patient participated Level of consciousness: awake and alert Pain score: 1 Pain management: adequate Airway patency: patent Anesthetic complications: no 
Cardiovascular status: acceptable Respiratory status: acceptable Hydration status: acceptable Comments: Patient doing well. Continue care on floor. Post anesthesia nausea and vomiting:  none Visit Vitals /67 (BP 1 Location: Right arm, BP Patient Position: At rest) Pulse 92 Temp 37 °C (98.6 °F) Resp 16 Ht 5' 1\" (1.549 m) Wt 66.2 kg (145 lb 16 oz) SpO2 98% BMI 27.59 kg/m²

## 2019-01-24 NOTE — PROGRESS NOTES
Problem: Mobility Impaired (Adult and Pediatric)  Goal: *Acute Goals and Plan of Care (Insert Text)  GOALS (1-4 days):  (1.)Ms. Charleen Hector will move from supine to sit and sit to supine  in bed with STAND BY ASSIST.  (2.)Ms. Charleen Hector will transfer from bed to chair and chair to bed with STAND BY ASSIST using the least restrictive device. (3.)Ms. Harrington will ambulate with STAND BY ASSIST for 300 feet with the least restrictive device. (4.)Ms. Harrington will ambulate up/down 3 steps with bilateral  railing with CONTACT GUARD ASSIST with no device. (5.)Ms. Charleen Hector will increase left knee ROM to 5°-80°.  ________________________________________________________________________________________________      PHYSICAL THERAPY [facility] tKa: Initial Assessment, Treatment Day: Day of Assessment, PM 1/24/2019  INPATIENT: Hospital Day: 1  Payor: Enzo Jane / Plan: GONZALO FITCH OAP / Product Type: Commerical /      NAME/AGE/GENDER: Reyes Sloan is a 58 y.o. female   PRIMARY DIAGNOSIS:  Unilateral primary osteoarthritis, left knee [M17.12]   Procedure(s) and Anesthesia Type:     * LEFT KNEE ARTHROPLASTY TOTAL / MANDIE - General (Left)  ICD-10: Treatment Diagnosis:    · Pain in Left Knee (M25.562)  · Stiffness of Left Knee, Not elsewhere classified (M25.662)  · Difficulty in walking, Not elsewhere classified (R26.2)      ASSESSMENT:     Ms. Charleen Hector presents with decreased strength and range of motion left lower extremity and with decreased independence with functional mobility s/p left TKA. Pt will benefit from skilled PT interventions to maximize independence with functional mobility and TKA Exercises. Pt did well with assessment and was able to get out of bed into chair. Worked on TKA exercises with verbal cues and put ice on knee. Pt instructed not to get up without assist. Needs in reach. This section established at most recent assessment   PROBLEM LIST (Impairments causing functional limitations):  1. Decreased Strength  2.  Decreased ADL/Functional Activities  3. Decreased Transfer Abilities  4. Decreased Ambulation Ability/Technique  5. Edema/Girth  6. Decreased Gary with Home Exercise Program   INTERVENTIONS PLANNED: (Benefits and precautions of physical therapy have been discussed with the patient.)  1. Bed Mobility  2. Cold  3. Gait Training  4. Home Exercise Program (HEP)  5. Therapeutic Exercise/Strengthening  6. Transfer Training  7. Range of Motion: active/assisted/passive  8. Therapeutic Activities  9. Group Therapy     TREATMENT PLAN: Frequency/Duration: Follow patient BID for duration of hospital stay to address above goals. Rehabilitation Potential For Stated Goals: Good     RECOMMENDED REHABILITATION/EQUIPMENT: (at time of discharge pending progress): Continue Skilled Therapy, Home Health: Physical Therapy and Outpatient: Physical Therapy. HISTORY:   History of Present Injury/Illness (Reason for Referral):  Pt s/p left TKA on 1/24/19  Past Medical History/Comorbidities:   Ms. Rakan John  has a past medical history of Angina, Arthritis, Bronchitis (? 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 She also has no past medical history of Coagulation defects.   Ms. Rakan John  has a past surgical history that includes pr abdomen surgery proc unlisted (1996); hx hernia repair (1997); hx cholecystectomy (1998); hx lumbar fusion (1974 or 76); hx oophorectomy (); hx  section (1991); hx drea and bso (); hx back surgery (); hx back surgery (); hx back surgery (); hx cervical fusion (10/11); and hx knee replacement (). Social History/Living Environment:   Home Environment: Private residence  # Steps to Enter: 2  One/Two Story Residence: One story  Living Alone: Yes  Support Systems: Family member(s), Friends \ neighbors  Patient Expects to be Discharged to[de-identified] Private residence  Current DME Used/Available at Home: None  Tub or Shower Type: Shower  Prior Level of Function/Work/Activity:  Pt living at home, independent with gait and ADLs without assistive devices   Number of Personal Factors/Comorbidities that affect the Plan of Care: 0: LOW COMPLEXITY   EXAMINATION:   Most Recent Physical Functioning:   Gross Assessment: Yes  Gross Assessment  AROM: Within functional limits(except left lower extremity, s/p TKA)  Strength: Within functional limits(except left lower extremity, s/p TKA)                     Bed Mobility  Supine to Sit: Minimum assistance  Sit to Supine: (stayed up in chair)    Transfers  Sit to Stand: Minimum assistance;Assist x2  Stand to Sit: Minimum assistance;Assist x2    Balance  Sitting: Intact  Standing: Pull to stand; With support    Posture  Posture (WDL): Within defined limits         Weight Bearing Status  Left Side Weight Bearing: As tolerated  Distance (ft): 3 Feet (ft)  Ambulation - Level of Assistance: Minimal assistance;Assist x2  Assistive Device: Walker, rolling  Speed/Sonja: Pace decreased (<100 feet/min); Shuffled  Step Length: Right shortened  Stance: Left decreased  Gait Abnormalities: Antalgic;Decreased step clearance; Step to gait        Braces/Orthotics: none    Left Knee Cold  Type: Cryocuff  Patient Position: Sitting      Body Structures Involved:  1. Joints  2. Muscles Body Functions Affected:  1. Movement Related Activities and Participation Affected:  1. Mobility  2.  Self Care   Number of elements that affect the Plan of Care: 4+: HIGH COMPLEXITY   CLINICAL PRESENTATION:   Presentation: Stable and uncomplicated: LOW COMPLEXITY   CLINICAL DECISION MAKIN [facility] [address] [address] AM-PAC 6 Clicks   Basic Mobility Inpatient Short Form  How much difficulty does the patient currently have. .. Unable A Lot A Little None   1. Turning over in bed (including adjusting bedclothes, sheets and blankets)? [] 1   [] 2   [x] 3   [] 4   2. Sitting down on and standing up from a chair with arms ( e.g., wheelchair, bedside commode, etc.)   [] 1   [] 2   [x] 3   [] 4   3. Moving from lying on back to sitting on the side of the bed? [] 1   [] 2   [x] 3   [] 4   How much help from another person does the patient currently need. .. Total A Lot A Little None   4. Moving to and from a bed to a chair (including a wheelchair)? [] 1   [] 2   [x] 3   [] 4   5. Need to walk in hospital room? [] 1   [] 2   [x] 3   [] 4   6. Climbing 3-5 steps with a railing? [] 1   [] 2   [x] 3   [] 4   © , Trustees of [address] [place] [address] [address] [address], under license to [employer]. All rights reserved        Score:  Initial: 18 Most Recent: X (Date: -- )    Interpretation of Tool:  Represents activities that are increasingly more difficult (i.e. Bed mobility, Transfers, Gait). Score 24 23 22-20 19-15 14-10 9-7 6     Modifier CH CI CJ CK CL CM CN      ? Mobility - Walking and Moving Around:     - CURRENT STATUS: CK - 40%-59% impaired, limited or restricted    - GOAL STATUS: CI - 1%-19% impaired, limited or restricted    - D/C STATUS:  ---------------To be determined---------------  Payor: CONSTANTINE / Plan: GONZALO FITCH OAP / Product Type: Commerical /      Medical Necessity:     · Patient is expected to demonstrate progress in strength, range of motion and functional technique to decrease assistance required with functional mobility and TKA exercises.   Reason for Services/Other Comments:  · Patient continues to require skilled intervention due to inability to complete functional mobility and TKA exercises indepenently. Use of outcome tool(s) and clinical judgement create a POC that gives a: Clear prediction of patient's progress: LOW COMPLEXITY            TREATMENT:   (In addition to Assessment/Re-Assessment sessions the following treatments were rendered)     Pre-treatment Symptoms/Complaints:  none  Pain Initial:   Pain Intensity 1: 0  Post Session:  0/10     Therapeutic Exercise: (8 Minutes):  Exercises per grid below to improve mobility and strength. Required minimal verbal and manual cues to promote proper body alignment and promote proper body posture. Progressed repetitions as indicated. Date:  1/24/19 Date:   Date:     ACTIVITY/EXERCISE AM PM AM PM AM PM   GROUP THERAPY  []  []  []  []  []  []   Ankle Pumps  10       Quad Sets  10       Gluteal Sets  10       Hip ABd/ADduction  10       Straight Leg Raises  10       Knee Slides  10       Short Arc Quads         Long Arc Quads         Chair Slides                  B = bilateral; AA = active assistive; A = active; P = passive      Treatment/Session Assessment:     Response to Treatment:  Tolerated well. Education:  [] Home Exercises  [x] Fall Precautions  [x] no pillow under knee [] D/C Instruction Review  [] Knee Prosthesis Review  [] Walker Management/Safety [] Adaptive Equipment as Needed       Interdisciplinary Collaboration:   o Occupational Therapist  o Registered Nurse    After treatment position/precautions:   o Up in chair  o Bed/Chair-wheels locked  o Call light within reach  o Visitors at bedside    Compliance with Program/Exercises: Compliant all of the time, Will assess as treatment progresses. Recommendations/Intent for next treatment session:  Treatment next visit will focus on increasing Ms. Harrington's independence with bed mobility, transfers, gait training, strength/ROM exercises, modalities for pain, and patient education.       Total Treatment Duration:  PT Patient Time In/Time Out  Time In: 1610  Time Out: [address] [facility],

## 2019-01-24 NOTE — H&P
The patient has end stage arthritis of the left knee joint. The patient was seen and examined and there are no changes to the patient's orthopedic condition. They have tried conservative treatment for this condition; including antiinflammatories and lifestyle modifications and have failed. The necessity for the joint replacement is still present, and the H&P from the office is still current. The patient will be admitted today forProcedure(s) (LRB):  LEFT KNEE ARTHROPLASTY TOTAL / Kadie  / ANCEF 2gm / VANCOMYCIN (PA) (Left) .

## 2019-01-24 NOTE — PERIOP NOTES
TRANSFER - OUT REPORT:    Verbal report given to Angi Paniagua RN on Gerhardt Kidd  being transferred to  for routine post - op       Report consisted of patients Situation, Background, Assessment and   Recommendations(SBAR). Information from the following report(s) OR Summary, Procedure Summary, Intake/Output and MAR was reviewed with the receiving nurse. Opportunity for questions and clarification was provided.       Patient transported with:   O2 @ 2 liters

## 2019-01-24 NOTE — PROGRESS NOTES
01/24/19 1635   Oxygen Therapy   O2 Sat (%) 97 %   Pulse via Oximetry 97 beats per minute   O2 Device Nasal cannula   O2 Flow Rate (L/min) 3 l/min  (weaned to room air)   Incentive Spirometry Treatment   Actual Volume (ml) 1500 ml   Number of Attempts 1   Patient achieved   1500    Ml/sec on IS. Patient encouraged to do every hour while awake-patient agreed and demonstrated. No shortness of breath or distress noted. BS are clear b/l.    [facility] notes reviewed- continuous sat with no number ordered HS

## 2019-01-24 NOTE — PROGRESS NOTES
TRANSFER - IN REPORT:    Verbal report received from Nickolas Mendez rn(name) on Dior   being received from PACU(unit) for routine progression of care      Report consisted of patients Situation, Background, Assessment and   Recommendations(SBAR). Information from the following report(s) SBAR, Procedure Summary, Intake/Output, MAR and Recent Results was reviewed with the receiving nurse. Opportunity for questions and clarification was provided. Assessment completed upon patients arrival to unit and care assumed.

## 2019-01-24 NOTE — ANESTHESIA PREPROCEDURE EVALUATION
Anesthetic History Review of Systems / Medical History Patient summary reviewed and pertinent labs reviewed Pulmonary COPD: moderate Smoker (Quit 9/2018) Neuro/Psych Psychiatric history (Anxiety/Depression) Cardiovascular Hypertension: well controlled Exercise tolerance: >4 METS Comments: Denies CP, SOB or changes in functional status GI/Hepatic/Renal 
  
GERD: well controlled Renal disease: CRI 
PUD Endo/Other Diabetes: poorly controlled, type 2 Hypothyroidism: well controlled Obesity and arthritis Other Findings Comments: Extensive back surgery Physical Exam 
 
Airway Mallampati: II 
TM Distance: 4 - 6 cm Neck ROM: normal range of motion Mouth opening: Normal 
 
 Cardiovascular Rhythm: regular Rate: normal 
 
 
 
 Dental 
 
Dentition: Edentulous Pulmonary Decreased breath sounds: bilateral 
 
 
 
 
 Abdominal 
GI exam deferred Other Findings Anesthetic Plan ASA: 3 Anesthesia type: general 
 
 
Post-op pain plan if not by surgeon: peripheral nerve block single Induction: Intravenous Anesthetic plan and risks discussed with: Patient and Family Pt refuses spinal. Of note, she has SEVERE scoliosis

## 2019-01-24 NOTE — PROGRESS NOTES
Problem: Self Care Deficits Care Plan (Adult)  Goal: *Acute Goals and Plan of Care (Insert Text)  GOALS:   DISCHARGE GOALS (in preparation for going home/rehab):  3 days  1. Ms. Remedios Araiza will perform one lower body dressing activity with minimal assistance required to demonstrate improved functional mobility and safety. 2.  Ms. Remedios Araiza will perform one lower body bathing activity with minimal assistance required to demonstrate improved functional mobility and safety. 3.  Ms. Remedios Araiza will perform toileting/toilet transfer with contact guard assistance to demonstrate improved functional mobility and safety. 4.  Ms. Remedios Araiza will perform shower transfer with contact guard assistance to demonstrate improved functional mobility and safety. [facility] CAMP OCCUPATIONAL THERAPY TKA: Initial Assessment 1/24/2019  INPATIENT: Hospital Day: 1  Payor: Trinidad Gonzalez / Plan: GONZALO FITCH OAP / Product Type: Commerical /      NAME/AGE/GENDER: Carter Dietrich is a 58 y.o. female   PRIMARY DIAGNOSIS:  Unilateral primary osteoarthritis, left knee [M17.12]   Procedure(s) and Anesthesia Type:     * LEFT KNEE ARTHROPLASTY TOTAL / MANDIE - General (Left)  ICD-10: Treatment Diagnosis:    · Pain in Left Knee (M25.562)  · Stiffness of Left Knee, Not elsewhere classified (K34.883)      ASSESSMENT:     Ms. Remedios Araiza is s/p Left TKA and presents with decreased weight bearing on L LE and decreased independence with functional mobility and activities of daily living as compared to baseline level of function and safety. Patient would benefit from skilled Occupational Therapy to maximize independence and safety with self-care task and functional mobility. Pt would also benefit from education on adaptive equipment and safety precautions in preparation for going home. Able to SPT from bed to recliner using a RW with therapies. Hopefull to progress well tomorrow in hopes to return home tomorrow as well.      This section established at most recent assessment PROBLEM LIST (Impairments causing functional limitations):  1. Decreased Strength  2. Decreased ADL/Functional Activities  3. Decreased Transfer Abilities  4. Increased Pain  5. Increased Fatigue  6. Decreased Flexibility/Joint Mobility  7. Decreased Knowledge of Precautions   INTERVENTIONS PLANNED: (Benefits and precautions of occupational therapy have been discussed with the patient.)  1. Activities of daily living training  2. Adaptive equipment training  3. Balance training  4. Clothing management  5. Donning&doffing training  6. Theraputic activity     TREATMENT PLAN: Frequency/Duration: Follow patient 1-2tx to address above goals. Rehabilitation Potential For Stated Goals: Excellent     RECOMMENDED REHABILITATION/EQUIPMENT: (at time of discharge pending progress): Continue Skilled Therapy. OCCUPATIONAL PROFILE AND HISTORY:   History of Present Injury/Illness (Reason for Referral): Pt presents this date s/p (left) TKA. Past Medical History/Comorbidities:   Ms. Madisyn Orellana  has a past medical history of Angina, Arthritis, Bronchitis (? Aug 2013), CAD (coronary artery disease) (2011), Cancer [facility]), Chronic kidney disease, Chronic pain, COPD, COPD (chronic obstructive pulmonary disease) ([address] (12/13/13), Diabetes ([facility] [address] GERD (gastroesophageal reflux disease), History of colon cancer (3/15/2013), Hypertension, Insomnia, Leiomyosarcoma of arm ([address] (1998), Mixed hyperlipidemia (3/15/2013), Nicotine vapor product user, Psychiatric disorder, PUD (peptic ulcer disease), Renal insufficiency (3/15/2013), Scoliosis, congenital, Thyroid nodule (3/15/2013), and Type II or unspecified type diabetes mellitus without mention of complication, not stated as uncontrolled (3/15/2013). She also has no past medical history of Coagulation defects.   Ms. Madisyn Orellana  has a past surgical history that includes pr abdomen surgery proc unlisted (1996); hx hernia repair (1997); hx cholecystectomy (1998); hx lumbar fusion (1974 or 76); hx oophorectomy (); hx  section (1991); hx drea and bso (); hx back surgery (); hx back surgery (); hx back surgery (); hx cervical fusion (10/11); and hx knee replacement (). Social History/Living Environment:   Home Environment: Private residence  # Steps to Enter: 2  One/Two Story Residence: One story  Living Alone: Yes  Support Systems: Family member(s), Friends \ neighbors  Patient Expects to be Discharged to[de-identified] Private residence  Current DME Used/Available at Home: None  Tub or Shower Type: Shower  Prior Level of Function/Work/Activity:  Independent prior. Number of Personal Factors/Comorbidities that affect the Plan of Care: Brief history (0):  LOW COMPLEXITY   ASSESSMENT OF OCCUPATIONAL PERFORMANCE[de-identified]   Most Recent Physical Functioning:   Balance  Sitting: Intact  Standing: Pull to stand; With support       Gross Assessment: Yes  Gross Assessment  AROM: Within functional limits(except left lower extremity, s/p TKA)  Strength: Within functional limits(except left lower extremity, s/p TKA)            Coordination  Fine Motor Skills-Upper: Left Intact; Right Intact  Gross Motor Skills-Upper: Left Intact; Right Intact         Mental Status  Neurologic State: Drowsy  Orientation Level: Oriented X4  Cognition: Appropriate decision making  Perception: Appears intact                Basic ADLs (From Assessment) Complex ADLs (From Assessment)   Basic ADL  Feeding: Independent  Oral Facial Hygiene/Grooming: Setup  Bathing: Minimum assistance  Upper Body Dressing: Setup  Lower Body Dressing: Moderate assistance  Toileting: Moderate assistance     Grooming/Bathing/Dressing Activities of Daily Living                       Functional Transfers  Bathroom Mobility: Minimum assistance  Toilet Transfer : Minimum assistance  Shower Transfer:  Moderate assistance     Bed/Mat Mobility  Supine to Sit: Minimum assistance  Sit to Supine: (stayed up in chair)  Sit to Stand: Minimum assistance;Assist x2  Bed to Chair: Contact guard assistance;Minimum assistance         Physical Skills Involved:  1. Range of Motion  2. Balance  3. Strength Cognitive Skills Affected (resulting in the inability to perform in a timely and safe manner): 1. wfl Psychosocial Skills Affected: 1. wfl   Number of elements that affect the Plan of Care: 1-3:  LOW COMPLEXITY   CLINICAL DECISION MAKIN[address] [place] [address] AM-PAC 6 Clicks   Daily Activity Inpatient Short Form  How much help from another person does the patient currently need. .. Total A Lot A Little None   1. Putting on and taking off regular lower body clothing? [] 1   [x] 2   [] 3   [] 4   2. Bathing (including washing, rinsing, drying)? [] 1   [x] 2   [] 3   [] 4   3. Toileting, which includes using toilet, bedpan or urinal?   [] 1   [x] 2   [] 3   [] 4   4. Putting on and taking off regular upper body clothing? [] 1   [] 2   [] 3   [x] 4   5. Taking care of personal grooming such as brushing teeth? [] 1   [] 2   [] 3   [x] 4   6. Eating meals? [] 1   [] 2   [] 3   [x] 4   © , Trustees of [address] [place] [address], under license to [employer]. All rights reserved     Score:  Initial: 18 Most Recent: X (Date: -- )    Interpretation of Tool:  Represents activities that are increasingly more difficult (i.e. Bed mobility, Transfers, Gait). Score 24 23 22-20 19-15 14-10 9-7 6     Modifier CH CI CJ CK CL CM CN      ? Self Care:     - CURRENT STATUS: CK - 40%-59% impaired, limited or restricted    - GOAL STATUS: CJ - 20%-39% impaired, limited or restricted    - D/C STATUS:  ---------------To be determined---------------  Payor: CONSTANTINE / Plan: GONZALO FITCH OAP / Product Type: Commerical /      Medical Necessity:     · Skilled intervention continues to be required due to Deficits lited above. Reason for Services/Other Comments:  · Patient continues to require skilled intervention due to new TKA.    Use of outcome tool(s) and clinical judgement create a POC that gives a: MODERATE COMPLEXITY            TREATMENT:   (In addition to Assessment/Re-Assessment sessions the following treatments were rendered)     Pre-treatment Symptoms/Complaints:    Pain: Initial:   Pain Intensity 1: 0  Post Session:  0     Assessment/Reassessment only, no treatment provided today    Treatment/Session Assessment:     Response to Treatment:  Good, sitting up in recliner. Education:  [] Home Exercises  [x] Fall Precautions  [] Hip Precautions [] Going Home Video  [x] Knee/Hip Prosthesis Review  [x] Walker Management/Safety [x] Adaptive Equipment as Needed       Interdisciplinary Collaboration:   o Physical Therapist  o Occupational Therapist  o Registered Nurse    After treatment position/precautions:   o Up in chair  o Bed/Chair-wheels locked  o Caregiver at bedside  o Call light within reach  o RN notified     Compliance with Program/Exercises: Compliant all of the time. Recommendations/Intent for next treatment session:  Treatment next visit will focus on increasing Ms. Harrington's independence with bed mobility, transfers, self care, functional mobility, modalities for pain, and patient education.       Total Treatment Duration:  OT Patient Time In/Time Out  Time In: 1600  Time Out: [address] [place], [place]

## 2019-01-24 NOTE — PROGRESS NOTES
Pt up in recliner wiggling all toes well and has strong push/ pulls to both feet. Orders received as written for Diabetic mtg. Family at bedside.

## 2019-01-24 NOTE — OP NOTES
[facility]  Total Knee Arthroplasty  Patient:Linda Wu   : 1955  Medical Record [id number]      Pre-operative Diagnosis:  Unilateral primary osteoarthritis, left knee [M17.12]  Post-operative Diagnosis: Unilateral primary osteoarthritis, left knee [M17.12]  Location: [place] [address]    Date of Procedure: 2019  Surgeon: Beverley Kingsley MD  Assistant:  PALOMA Romano    Anesthesia: General and  nerve block    Procedure:  Left Posterior Stabilized Total Knee Arthroplasty with use of Bone Cement    Tourniquet Time: none    EBL:  300 cc     The complexity of the total joint surgery requires the use of a first assistant for positioning, retraction and assistance in closure. Brittaney Napoles was brought to the operating room, positioned on the operating room table, and after appropriate identification  was anethestized. A vides catheter was placed preoperatively and IV antibiotics were administered, along with IV transexamic acid. The limb was prepped and draped in the usual sterile fashion. Prior to the incision being made a timeout was called identifying the patient, procedure ,operative side and surgeon. An anterior longitudinal incision was accomplished just medial to the tibial tubercle and extending approximal 6 centimeters proximal to the superior pole of the patella. A medial parapatellar capsular incision was performed. The medial capsular flap was elevated around to the insertion of the semimembranous tendon. The patella was everted and the knee flexed and externally rotated. The articular surface revealed cartilage loss with exposed bone and bone spurs throughout all three compartments. The medial and lateral menisci were excised. The lateral half of the fat pad excised and the patella femoral ligament was released. The anterior cruciate ligament and the posterior cruciate ligament were resected.   Using extramedullary instrumentation, the tibial cut was accomplished with appropriate posterior slope. Approximately 6 mm of bone was removed from the high side of the tibia. The distal femur was addressed next. A drill hole was made above the intracondylar notch. Using appropriate intramedullary instrumentation, an appropriate valgus distal cut was accomplished. The femur was sized to a 3 component. The anterior and posterior cuts and anterior and posterior chamfer cuts were then made about the distal femur. Osteophytes were removed from the tibial and femoral surfaces. The appropriate cutting blocks was then utilized to perform the notch cut, with appropriate lateral translation accomplished for the patellofemoral groove. The tibia was sized to a 2 component. The tibial base plate was pinned into place with  appropriate external rotation and the stem site prepared. A preliminary range of motion was accomplished with the above size trial components. A 11 millimeter polyethylene insert allowed the patient to obtain full extension as well as appropriate flexion. Additional surgical releases were none. .  The patient's ligaments were stable in flexion and extension to medial and lateral stressing and alignment was through the appropriate mechanical axis. The patella was then everted. The bone was resected to accomodate a size 31 patella button. A trial reduction revealed appropriate tracking through the patellofemoral groove with no lateral retinacular release accomplished. All trial components were removed and the surfaces were prepared for cementing with irrigation and debridement of the bone interstices. The posterior capsule and periosteum and soft tissues were injected for postop pain management. One package of cement  was mixed and the permanent components cemented into place. The femoral and tibial components were pressurized in full extension as well as 70 degrees of flexion.   The patella component was pressurized using the patella clamp. Excess cement was removed using a curette. Once the cement was hardened, the patella clamp was removed and the knee was copiously irrigated. A lavage of diluted betadine solution of 17.5 ml Betadine in 500 of 0.9% Normal Saline was allowed to soak in the wound for 3 minutes after implanting of the prosthesis. The wound was irrigated with Saline again before closure. Prior to the final skin closure, full strength betadine was applied to the skin surrounding the skin incision. After fascial closure the knee was injected with a solution of 1 gram of TXA and 1 gram of Vancomycin powder. Tadeo Harrington's knee was placed through a range of motion and noted to be stable as mentioned above with the trial components. The operative knee was injected prior to closure for post op pain management. The capsular layer was closed using a #1 PDS suture, while the subcutaneous layers were closed using a 2-0 Monocryl interrupted suture. The skin was closed using staples and a sterile bandage was applied. A cryo pad was applied on the operative leg. The sponge count and needle counts were correct. Implants:   Implant Name Type Inv.  Item Serial No.  Lot No. LRB No. Used   CEMENT BNE HV R 40GM -- PALACOS R [id number] - [id number]  CEMENT BNE HV R 40GM -- PALACOS R [id number] [facility] [id number] Left 1   COMPNT FEM PS FRACISCO TRIATHLN 3 L -- TRIATHLON - [id number]  COMPNT FEM PS FRACISCO TRIATHLN 3 L -- TRIATHLON [id number] [employer] HOW [id number] Left 1   BASEPLT TIB TRIATHLON SZ2 --  - [id number]  BASEPLT TIB TRIATHLON SZ2 --  [id number] [employer] HOW [id number] Left 1   INSERT TIB PS TRIATHLN 2 11MM --  - [id number]  INSERT TIB PS TRIATHLN 2 11MM --  [id number] [employer] HOW [id number] Left 1   COMPNT PAT SYM TRIATHLN 31X9MM --  - [id number]  COMPNT PAT SYM TRIATHLN 31X9MM --  [id number] [employer] HOW [id number] Left 1     Signed By: Hallie Parkinson MD

## 2019-01-24 NOTE — ANESTHESIA PROCEDURE NOTES
Peripheral Block Start time: 1/24/2019 10:58 AM 
End time: 1/24/2019 11:01 AM 
Performed by: Luiza Brown MD 
Authorized by: Luiza Brown MD  
 
 
Pre-procedure: Indications: at surgeon's request and post-op pain management Preanesthetic Checklist: patient identified, risks and benefits discussed, site marked, timeout performed, anesthesia consent given and patient being monitored Timeout Time: 10:58 Block Type:  
Block Type: Adductor canal 
Laterality:  Left Monitoring:  Standard ASA monitoring, responsive to questions, continuous pulse ox, oxygen, frequent vital sign checks and heart rate Injection Technique:  Single shot Procedures: ultrasound guided Patient Position: supine Prep: chlorhexidine Location:  Mid thigh Needle Type:  Stimuplex Needle Gauge:  22 G Needle Localization:  Ultrasound guidance Assessment: 
Number of attempts:  1 Injection Assessment:  Incremental injection every 5 mL, negative aspiration for CSF, ultrasound image on chart, no paresthesia, local visualized surrounding nerve on ultrasound, negative aspiration for blood and no intravascular symptoms Patient tolerance:  Patient tolerated the procedure well with no immediate complications

## 2019-01-24 NOTE — PERIOP NOTES
Betadine lavage:  17.5cc of betadine lot #[id number], exp. 04/20,  in 500cc of . 9NS Lot #[id number], exp.  [id number]: LEFT KNEE

## 2019-01-25 ENCOUNTER — HOME CARE VISIT (OUTPATIENT)
Dept: SCHEDULING | Facility: HOME HEALTH | Age: 64
End: 2019-01-25
Payer: COMMERCIAL

## 2019-01-25 VITALS
TEMPERATURE: 98.2 F | HEART RATE: 93 BPM | SYSTOLIC BLOOD PRESSURE: 151 MMHG | HEIGHT: 61 IN | DIASTOLIC BLOOD PRESSURE: 61 MMHG | OXYGEN SATURATION: 97 % | RESPIRATION RATE: 16 BRPM | BODY MASS INDEX: 27.56 KG/M2 | WEIGHT: 146 LBS

## 2019-01-25 VITALS
HEART RATE: 84 BPM | TEMPERATURE: 97.9 F | SYSTOLIC BLOOD PRESSURE: 149 MMHG | RESPIRATION RATE: 16 BRPM | DIASTOLIC BLOOD PRESSURE: 80 MMHG

## 2019-01-25 LAB
EST. AVERAGE GLUCOSE BLD GHB EST-MCNC: 171 MG/DL
GLUCOSE BLD STRIP.AUTO-MCNC: 194 MG/DL (ref 65–100)
HBA1C MFR BLD: 7.6 %
HGB BLD-MCNC: 11.6 G/DL (ref 11.7–15.4)

## 2019-01-25 PROCEDURE — 74011636637 HC RX REV CODE- 636/637: Performed by: HOSPITALIST

## 2019-01-25 PROCEDURE — 36415 COLL VENOUS BLD VENIPUNCTURE: CPT

## 2019-01-25 PROCEDURE — 85018 HEMOGLOBIN: CPT

## 2019-01-25 PROCEDURE — G0151 HHCP-SERV OF PT,EA 15 MIN: HCPCS

## 2019-01-25 PROCEDURE — 97110 THERAPEUTIC EXERCISES: CPT

## 2019-01-25 PROCEDURE — 74011250636 HC RX REV CODE- 250/636: Performed by: PHYSICIAN ASSISTANT

## 2019-01-25 PROCEDURE — 82962 GLUCOSE BLOOD TEST: CPT

## 2019-01-25 PROCEDURE — 74011250637 HC RX REV CODE- 250/637: Performed by: ORTHOPAEDIC SURGERY

## 2019-01-25 PROCEDURE — 97535 SELF CARE MNGMENT TRAINING: CPT

## 2019-01-25 PROCEDURE — 83036 HEMOGLOBIN GLYCOSYLATED A1C: CPT

## 2019-01-25 PROCEDURE — 97116 GAIT TRAINING THERAPY: CPT

## 2019-01-25 PROCEDURE — 74011250637 HC RX REV CODE- 250/637: Performed by: PHYSICIAN ASSISTANT

## 2019-01-25 PROCEDURE — 400013 HH SOC

## 2019-01-25 RX ORDER — ASPIRIN 81 MG/1
81 TABLET ORAL EVERY 12 HOURS
Qty: 60 TAB | Refills: 0 | Status: SHIPPED | OUTPATIENT
Start: 2019-01-25 | End: 2022-04-07

## 2019-01-25 RX ADMIN — HYDROMORPHONE HYDROCHLORIDE 1 MG: 2 INJECTION, SOLUTION INTRAMUSCULAR; INTRAVENOUS; SUBCUTANEOUS at 03:50

## 2019-01-25 RX ADMIN — OXYCODONE HYDROCHLORIDE 10 MG: 5 TABLET ORAL at 08:17

## 2019-01-25 RX ADMIN — ALPRAZOLAM 1 MG: 0.5 TABLET ORAL at 00:57

## 2019-01-25 RX ADMIN — HYDROMORPHONE HYDROCHLORIDE 1 MG: 2 INJECTION, SOLUTION INTRAMUSCULAR; INTRAVENOUS; SUBCUTANEOUS at 01:00

## 2019-01-25 RX ADMIN — Medication 2 G: at 03:50

## 2019-01-25 RX ADMIN — PANTOPRAZOLE SODIUM 40 MG: 40 TABLET, DELAYED RELEASE ORAL at 07:30

## 2019-01-25 RX ADMIN — Medication 10 ML: at 09:53

## 2019-01-25 RX ADMIN — INSULIN LISPRO 2 UNITS: 100 INJECTION, SOLUTION INTRAVENOUS; SUBCUTANEOUS at 07:17

## 2019-01-25 RX ADMIN — OXYCODONE HYDROCHLORIDE 10 MG: 5 TABLET ORAL at 05:10

## 2019-01-25 RX ADMIN — OXYCODONE HYDROCHLORIDE 10 MG: 5 TABLET ORAL at 10:46

## 2019-01-25 RX ADMIN — HYDROCHLOROTHIAZIDE: 12.5 CAPSULE ORAL at 08:18

## 2019-01-25 RX ADMIN — METOPROLOL SUCCINATE 50 MG: 50 TABLET, EXTENDED RELEASE ORAL at 08:20

## 2019-01-25 RX ADMIN — HYDROMORPHONE HYDROCHLORIDE 1 MG: 2 INJECTION, SOLUTION INTRAMUSCULAR; INTRAVENOUS; SUBCUTANEOUS at 06:32

## 2019-01-25 RX ADMIN — Medication 1 AMPULE: at 08:17

## 2019-01-25 RX ADMIN — AMLODIPINE BESYLATE 5 MG: 5 TABLET ORAL at 08:17

## 2019-01-25 RX ADMIN — ASPIRIN 81 MG: 81 TABLET, COATED ORAL at 08:17

## 2019-01-25 RX ADMIN — ALUMINUM HYDROXIDE, MAGNESIUM HYDROXIDE, AND SIMETHICONE 30 ML: 200; 200; 20 SUSPENSION ORAL at 03:53

## 2019-01-25 RX ADMIN — ALPRAZOLAM 1 MG: 0.5 TABLET ORAL at 08:18

## 2019-01-25 RX ADMIN — MORPHINE SULFATE 60 MG: 30 TABLET, FILM COATED, EXTENDED RELEASE ORAL at 08:17

## 2019-01-25 RX ADMIN — SENNOSIDES AND DOCUSATE SODIUM 2 TABLET: 8.6; 5 TABLET ORAL at 08:20

## 2019-01-25 RX ADMIN — PANTOPRAZOLE SODIUM 40 MG: 40 TABLET, DELAYED RELEASE ORAL at 05:10

## 2019-01-25 RX ADMIN — HYDROMORPHONE HYDROCHLORIDE 1 MG: 2 INJECTION, SOLUTION INTRAMUSCULAR; INTRAVENOUS; SUBCUTANEOUS at 09:48

## 2019-01-25 NOTE — PROGRESS NOTES
Care Management Interventions  Mode of Transport at Discharge: Self  Transition of Care Consult (CM Consult): [address]: Yes  Discharge Durable Medical Equipment: Yes  Physical Therapy Consult: Yes  Occupational Therapy Consult: Yes  Current Support Network: Own Home  Confirm Follow Up Transport: Family  Plan discussed with Pt/Family/Caregiver: Yes  Freedom of Choice Offered: Yes  Discharge Location  Discharge Placement: Home with home health    Patient is a 58y.o. year old female admitted for Left TKA . Patient plans to return home on discharge. Order received to arrange home health. Patient without preference towards agency. Referral sent to [facility]. Patient requesting we arrange a walker and bedside commode. Referral sent to [facility] who will deliver to the hospital room prior to discharge. Will follow until discharge.

## 2019-01-25 NOTE — PROGRESS NOTES
Problem: Falls - Risk of  Goal: *Absence of Falls  Document Molly Fall Risk and appropriate interventions in the flowsheet.   Outcome: Progressing Towards Goal  Fall Risk Interventions:  Mobility Interventions: Communicate number of staff needed for ambulation/transfer    Mentation Interventions: Evaluate medications/consider consulting pharmacy    Medication Interventions: Evaluate medications/consider consulting pharmacy    Elimination Interventions: Call light in reach

## 2019-01-25 NOTE — CONSULTS
Hospitalist Consult Note      Patient: Carter Dietrich               Sex: female             MRN: [id number]      YOB: 1955      Age:  58 y.o. Reason for Consult:  Medical Managment    HPI     This is a 69-year-old female with a past medical history of diabetes, COPD, hypertension, anxiety, depression presents to the hospital for left total knee arthroplasty. Patient is having pain in the Left knee, denies any Chest pain, SOB, Palpitations. No Nausea or vomiting  VS reviewed as below    Review of Systems  Comprehensive 10 point ROS is done, and pertinent positives & negatives per HPI, rest of them are negative. Past Medical History:   Diagnosis Date    Angina     at rest in 3/2010 potassium low; denies currently    Arthritis     does not know kind-sees rheumatologist/ spine/ LLE/ L ft/toes; pt reports psoriatic    Bronchitis ? Aug 2013    no hospitalization    CAD (coronary artery disease) 2011    EF= 65 %  left anterior descending coronary stenosis is 30 %. 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    Chronic kidney disease     Chronic pain     spine-has congenital scoliosis-R knee    COPD     COPD [facility]chronic obstructive pulmonary disease) [facility] 12/13/13    no use of rescue inhaler x 3 wks; denies ABDELRAHMAN    Diabetes ([facility] [address]     type 2 insulin dep x 2 yrs;avg fasting BS-118-205;S/S hypo @ 80    GERD (gastroesophageal reflux disease)     controlled with protonix    History of colon cancer 3/15/2013    S/p partial colectomy    Hypertension     controlled with med    Insomnia     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 Pablo Lucio Mixed hyperlipidemia 3/15/2013    intolerant to medications    Nicotine vapor product user     Psychiatric disorder     depression and anxiety no med    PUD (peptic ulcer disease)     gastric    Renal insufficiency 3/15/2013    Has seen Dr. Yoanna Owusu in the past.    Scoliosis, congenital     Thyroid nodule 3/15/2013    Type II or unspecified type diabetes mellitus without mention of complication, not stated as uncontrolled 3/15/2013      Is adjusting insulin at this time.         Past Surgical History:   Procedure Laterality Date    ABDOMEN SURGERY PROC UNLISTED  1996    colon resection    HX BACK SURGERY  2005    spinal cord implant    HX BACK SURGERY  2005    spinal cord implant removal    HX BACK SURGERY  2005    nerve embedded in muscle-resection R wrist    HX CERVICAL FUSION  10/11    w/ discectomy and cage - Dr. Anatoly Purvis    hernia repair    HX KNEE REPLACEMENT  5/11    partial - Dr. Cast Plan or 68    spinal fusion lumbar    HX OOPHORECTOMY  1974    left partial    HX CHRISTIAN AND BSO  1995       Family History   Problem Relation Age of Onset    Alcohol abuse Father     Alcohol abuse Mother     Lung Disease Sister     Heart Disease Sister     Lung Disease Sister     Heart Disease Brother     Diabetes Paternal Grandfather        Social History     Socioeconomic History    Marital status: LEGALLY      Spouse name: Not on file    Number of children: Not on file    Years of education: Not on file    Highest education level: Not on file   Tobacco Use    Smoking status: Former Smoker     Packs/day: 1.00     Years: 40.00     Pack years: 40.00     Types: Cigarettes    Smokeless tobacco: Former User     Quit date: 9/16/2018    Tobacco comment: quit   Substance and Sexual Activity    Alcohol use: No    Drug use: No       Allergies   Allergen Reactions    Latex Rash     Skin raw    Adhesive Other (comments)     Bruising, burning    Arthrotec 50 [Diclofenac-Misoprostol] Nausea Only    Chantix [Varenicline] Other (comments)     \"I had violent outbursts\"    Neurontin [Gabapentin] Palpitations    Nsaids (Non-Steroidal Anti-Inflammatory Drug) Other (comments)     Not to take due to kidneys blood and pus in urine.  Requip [Ropinirole] Other (comments)     Keep pt awake    Tylenol [Acetaminophen] Other (comments)     Bothers her liver or kidneys       Prior to Admission medications    Medication Sig Start Date End Date Taking? Authorizing Provider   cyclobenzaprine (FLEXERIL) 10 mg tablet Take 10 mg by mouth as needed for Muscle Spasm(s). Yes Provider, Historical   levocetirizine (XYZAL) 5 mg tablet Take  by mouth nightly. Yes Provider, Historical   albuterol (PROAIR HFA) 90 mcg/actuation inhaler Take 2 Puffs by inhalation every six (6) hours as needed for Wheezing. 12/20/18  Yes Jake Taylor MD   traZODone (DESYREL) 50 mg tablet 1-3 tabs at bedtime. 12/20/18  Yes Jake Taylor MD   metoprolol succinate (TOPROL-XL) 50 mg XL tablet TAKE 1 TABLET BY MOUTH EVERY DAY  Patient taking differently: nightly. TAKE 1 TABLET BY MOUTH EVERY DAY 12/5/18  Yes Jake Taylor MD   losartan-hydroCHLOROthiazide [facility]) 50-12.5 mg per tablet 1 by mouth daily at bedtime for blood pressure Instead of the Benicar HCTZ 12/5/18  Yes Jake Taylor MD   promethazine (PHENERGAN) 25 mg tablet Take 1 Tab by mouth every six (6) hours as needed. 12/5/18  Yes Jake Taylor MD   pantoprazole (PROTONIX) 40 mg tablet Take 1 Tab by mouth daily. For stomach - instead of prilosec( failed priolosec and nexium and prevacid) 12/5/18  Yes Jake Taylor MD   amLODIPine (NORVASC) 5 mg tablet Take 1 Tab by mouth daily. ( instead of tribenzor with benicar hct) 12/5/18  Yes Jake Taylor MD   chlorhexidine (HIBICLENS) 4 % liquid clean entire body - chin to feet, then  head - face x 3 d. repeat 3 times a week x3wk 12/5/18  Yes Jake Taylor MD   ALPRAZolam [facility]) 1 mg tablet Take 1 Tab by mouth three (3) times daily as needed for Anxiety.  11/19/18  Yes Jake Taylor MD   insulin degludec (TRESIBA FLEXTOUCH U-200) 200 unit/mL (3 mL) inpn 62 units sq daily ( 18 pens for a 90 day supply)  Patient taking differently: daily. 62 units sq daily ( 18 pens for a 90 day supply) 18  Yes Amberly Cardenas MD   Insulin Needles, Disposable, (ADVOCATE PEN NEEDLE) 31 gauge x \" ndle Use TID - QID for lantus, victoza, and apidra  (#300)  Dx: E11.9 DM 17  Yes Amberly Cardenas MD   lancets 30 gauge misc 1 Each by SubCUTAneous route daily. Test BS bid either fasting or 2 h after eating dx:250.00 17  Yes Amberly Cardenas MD   oxyCODONE IR (OXY-IR) 15 mg immediate release tablet Take 15 mg by mouth four (4) times daily as needed for Pain. Per Dr Chris Sanchez. Yes Provider, Historical   Aspirin, Buffered 81 mg tab Take  by mouth daily. Yes Provider, Historical   morphine CR (MS CONTIN) 60 mg CR tablet every eight (8) hours as needed. 1/2/15  Yes Provider, Historical   mupirocin calcium (BACTROBAN) 2 % topical cream Apply  to affected area two (2) times a day. 18   Amberly Cardenas MD   multivitamin with iron (DAILY MULTI-VITAMINS/IRON) tablet Take 1 Tab by mouth daily. Provider, Historical         Physical Exam     Visit Vitals  /73 (BP 1 Location: Right arm)   Pulse 97   Temp 98.1 °F (36.7 °C)   Resp 16   Ht 5' 1\" (1.549 m)   Wt 66.2 kg (145 lb 16 oz)   SpO2 96%   Breastfeeding?  No   BMI 27.59 kg/m²      Temp (24hrs), Av.2 °F (36.8 °C), Min:98 °F (36.7 °C), Max:98.6 °F (37 °C)    Oxygen Therapy  O2 Sat (%): 96 % (19)  Pulse via Oximetry: 105 beats per minute (19)  O2 Device: Room air (19)  O2 Flow Rate (L/min): 0 l/min (19)  FIO2 (%): 21 % (19)    Intake/Output Summary (Last 24 hours) at 2019  Last data filed at 2019 1246  Gross per 24 hour   Intake 1500 ml   Output 1200 ml   Net 300 ml          General:- Conscious, No acute distress   Eyes:- No pallor/icterus    Lungs- CTA Bilaterally, No significant wheezing  Heart:- S1 S2 regular  Abdomen:- Soft, Positive bowel sounds,   Neurologic: - AOX3, No acute FND,  Skin: - No acute rashes  Musculoskeletal: Left knee in dressing  Psych: - Appropriate mood    LAB  Recent Results (from the past 24 hour(s))   GLUCOSE, POC    Collection Time: 01/24/19  9:42 AM   Result Value Ref Range    Glucose (POC) 92 65 - 100 mg/dL   TYPE & SCREEN    Collection Time: 01/24/19  9:43 AM   Result Value Ref Range    Crossmatch Expiration 01/27/2019     ABO/Rh(D) O NEGATIVE     Antibody screen NEG        IMAGING:     No results found. Assessment/Plan     Active Problems:    Arthritis of left knee (1/24/2019)        1. RT TKA- DVT prophylaxis, PT/OT, pain management as per Ortho service    2. HTN- patient is currently on metoprolol 50 mg once daily, amlodipine 5 mg once daily, blood pressure slightly on the high side will order IV hydralazine as needed, holding off on Hyzaar 50/12.5 mg once daily can resume at discharge     3. DM- For her diabetes patient is on Traceba 62 units once daily took this AM, will resume at a lower dose 45U   Order for Sliding scale insulin      On Behalf of Ryan, I would like to thank Ortho service for allowing to see this patient, will follow    Stefan Couch MD  January 24, 2019

## 2019-01-25 NOTE — PROGRESS NOTES
Problem: Self Care Deficits Care Plan (Adult)  Goal: *Acute Goals and Plan of Care (Insert Text)  GOALS:   DISCHARGE GOALS (in preparation for going home/rehab):  3 days  1. Ms. Nia Pettit will perform one lower body dressing activity with minimal assistance required to demonstrate improved functional mobility and safety. -GOAL MET 1/25/2019   2. Ms. Nia Pettit will perform one lower body bathing activity with minimal assistance required to demonstrate improved functional mobility and safety. -GOAL MET 1/25/2019   3. Ms. Nia Pettit will perform toileting/toilet transfer with contact guard assistance to demonstrate improved functional mobility and safety. -GOAL MET 1/25/2019   4. Ms. Nia Pettit will perform shower transfer with contact guard assistance to demonstrate improved functional mobility and safety. -GOAL MET 1/25/2019       JOINT CAMP OCCUPATIONAL THERAPY TKA: Discharge and Treatment Day: 1st 1/25/2019  INPATIENT: Hospital Day: 2  Payor: Sebastian Cardenas / Plan: GONZALO FITCH OAP / Product Type: Commerical /      NAME/AGE/GENDER: Chetan Kirk is a 58 y.o. female   PRIMARY DIAGNOSIS:  Unilateral primary osteoarthritis, left knee [M17.12]   Procedure(s) and Anesthesia Type:     * LEFT KNEE ARTHROPLASTY TOTAL / MANDIE - General (Left)  ICD-10: Treatment Diagnosis:    · Pain in Left Knee (M25.562)  · Stiffness of Left Knee, Not elsewhere classified (W06.179)      ASSESSMENT:      Ms. Nia Pettit is s/p Left TKA and presents with decreased weight bearing on L LE and decreased independence with functional mobility and activities of daily living. Patient completed shower and dressing as charter below in ADL grid and is ambulating with rolling walker and stand by assist.  Patient has met 4/4 goals and plans to return home with good family support. Family able to provide patient with appropriate level of assistance at this time. D/C OT for acute deficits related to new TKA.           This section established at most recent assessment   PROBLEM LIST (Impairments causing functional limitations):  1. Decreased Strength  2. Decreased ADL/Functional Activities  3. Decreased Transfer Abilities  4. Increased Pain  5. Increased Fatigue  6. Decreased Flexibility/Joint Mobility  7. Decreased Knowledge of Precautions   INTERVENTIONS PLANNED: (Benefits and precautions of occupational therapy have been discussed with the patient.)  1. Activities of daily living training  2. Adaptive equipment training  3. Balance training  4. Clothing management  5. Donning&doffing training  6. Theraputic activity     TREATMENT PLAN: Frequency/Duration: Follow patient 1-2tx to address above goals. Rehabilitation Potential For Stated Goals: Excellent     RECOMMENDED REHABILITATION/EQUIPMENT: (at time of discharge pending progress): Continue Skilled Therapy. OCCUPATIONAL PROFILE AND HISTORY:   History of Present Injury/Illness (Reason for Referral): Pt presents this date s/p (left) TKA. Past Medical History/Comorbidities:   Ms. Phuc Davalos  has a past medical history of Angina, Arthritis, Bronchitis (? Aug 2013), CAD (coronary artery disease) (2011), Cancer 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 She also has no past medical history of Coagulation defects.   Ms. Phuc Davalos  has a past surgical history that includes pr abdomen surgery proc unlisted (1996); hx hernia repair (1997); hx cholecystectomy (1998); hx lumbar fusion (1974 or 76); hx oophorectomy (); hx  section (1991); hx drea and bso (); hx back surgery (); hx back surgery (); hx back surgery (); hx cervical fusion (10/11); and hx knee replacement (). Social History/Living Environment:   Home Environment: Private residence  # Steps to Enter: 2  One/Two Story Residence: One story  Living Alone: Yes  Support Systems: Family member(s), Friends \ neighbors  Patient Expects to be Discharged to[de-identified] Private residence  Current DME Used/Available at Home: None  Tub or Shower Type: Shower  Prior Level of Function/Work/Activity:  Independent prior. Number of Personal Factors/Comorbidities that affect the Plan of Care: Brief history (0):  LOW COMPLEXITY   ASSESSMENT OF OCCUPATIONAL PERFORMANCE[de-identified]   Most Recent Physical Functioning:   Balance  Sitting: Intact; Without support  Standing: Impaired; With support(walker)                  LLE Assessment  LLE Assessment (WDL): Exception to WDL  LLE PROM  L Knee Flexion: 55(~)  L Knee Extension: -15(~) Coordination  Fine Motor Skills-Upper: Left Intact; Right Intact  Gross Motor Skills-Upper: Left Intact; Right Intact         Mental Status  Neurologic State: Alert  Orientation Level: Oriented X4  Cognition: Appropriate decision making  Perception: Appears intact          RLE Assessment  RLE Assessment (WDL): Within defined limits     Basic ADLs (From Assessment) Complex ADLs (From Assessment)   Basic ADL  Feeding: Independent  Oral Facial Hygiene/Grooming: Independent  Bathing: Stand-by assistance  Type of Bath: Chlorhexidine (CHG), Full, Shower  Upper Body Dressing: Independent  Lower Body Dressing: Minimum assistance  Toileting: Stand by assistance     Grooming/Bathing/Dressing Activities of Daily Living                       Functional Transfers  Bathroom Mobility: Stand-by assistance  Toilet Transfer : Stand-by assistance  Shower Transfer: Stand-by assistance     Bed/Mat Mobility  Supine to Sit: (NT)  Sit to Supine: (NT)  Sit to Stand: Contact guard assistance  Bed to Chair: Contact guard assistance  Scooting: Contact guard assistance         Physical Skills Involved:  1. Range of Motion  2. Balance  3. Strength Cognitive Skills Affected (resulting in the inability to perform in a timely and safe manner): 1. wfl Psychosocial Skills Affected: 1. wfl   Number of elements that affect the Plan of Care: 1-3:  LOW COMPLEXITY   CLINICAL DECISION MAKIN[address] [place] [address] AM-PAC 6 Clicks   Daily Activity Inpatient Short Form  How much help from another person does the patient currently need. .. Total A Lot A Little None   1. Putting on and taking off regular lower body clothing? [] 1   [] 2   [x] 3   [] 4   2. Bathing (including washing, rinsing, drying)? [] 1   [] 2   [x] 3   [] 4   3. Toileting, which includes using toilet, bedpan or urinal?   [] 1   [] 2   [x] 3   [] 4   4. Putting on and taking off regular upper body clothing? [] 1   [] 2   [] 3   [x] 4   5. Taking care of personal grooming such as brushing teeth? [] 1   [] 2   [] 3   [x] 4   6. Eating meals? [] 1   [] 2   [] 3   [x] 4   © , Trustees of [address] [place] [address], under license to [employer]. All rights reserved     Score:  Initial: 18 Most Recent: 21 (Date: 2019 )    Interpretation of Tool:  Represents activities that are increasingly more difficult (i.e. Bed mobility, Transfers, Gait). Score 24 23 22-20 19-15 14-10 9-7 6     Modifier CH CI CJ CK CL CM CN      ?  Self Care:     - CURRENT STATUS: CJ - 20%-39% impaired, limited or restricted    - GOAL STATUS: CJ - 20%-39% impaired, limited or restricted    - D/C STATUS:  CJ - 20%-39% impaired, limited or restricted  Payor: [employer] / Plan: [place] [employer] OAP / Product Type: Commerical /         Use of outcome tool(s) and clinical judgement create a POC that gives a: MODERATE COMPLEXITY            TREATMENT:   (In addition to Assessment/Re-Assessment sessions the following treatments were rendered)     Pre-treatment Symptoms/Complaints:    Pain: Initial:   Pain Intensity 1: 6  Post Session:  6     Self Care: (38): Procedure(s) (per grid) utilized to improve and/or restore self-care/home management as related to dressing, bathing, toileting and grooming. Required minimal verbal and   cueing to facilitate activities of daily living skills. Treatment/Session Assessment:     Response to Treatment:  Good, sitting up in recliner. Education:  [] Home Exercises  [x] Fall Precautions  [] Hip Precautions [] Going Home Video  [x] Knee/Hip Prosthesis Review  [x] Walker Management/Safety [x] Adaptive Equipment as Needed       Interdisciplinary Collaboration:   o Physical Therapist  o Occupational Therapist  o Registered Nurse    After treatment position/precautions:   o Up in chair  o Bed/Chair-wheels locked  o Caregiver at bedside  o Call light within reach  o RN notified     Compliance with Program/Exercises: Compliant all of the time. Recommendations/Intent for next treatment session:  D/C OT for acute deficits.       Total Treatment Duration:  OT Patient Time In/Time Out  Time In: 0838  Time Out: [address], OT

## 2019-01-25 NOTE — PROGRESS NOTES
Pt alert and oriented x4 Cl has been seeking pain meds since start of shift and c/o not getting all the meds that she usually takes at home. Prn meds given due to c/o pain. On call MD- Dr Jesenia Arias was paged x2 earlier as pt c/o ongoing pain and stated meds were not helping her. MD initially ordered 1 time dose of Toradol but this was canceled due to pt allergy to NSAIDS. MD gave order for mylanta due to c/o stomach discomfort but did not give any additional orders for pain meds due to amt of meds pt already ordered. Call light within reach. Cl removed gripper socks due to c/o of socks being uncomfortable. Cl refused scheduled Tylenol as she reports allergy to tylenol (acetaminophen). Will continue to monitor and support.

## 2019-01-25 NOTE — PROGRESS NOTES
[facility]  Face to Face Encounter    Patients Name: Arti Westfall    YOB: 1955    Ordering Physician: William Kaur    Primary Diagnosis: Unilateral primary osteoarthritis, left knee [M17.12]  S/p left TKA    Date of Face to Face:   1-24-19                                Face to Face Encounter findings are related to primary reason for home care:   yes. 1. I certify that the patient needs intermittent care as follows: physical therapy: gait/stair training    2. I certify that this patient is homebound, that is: 1) patient requires the use of a walker device, special transportation, or assistance of another to leave the home; or 2) patient's condition makes leaving the home medically contraindicated; and 3) patient has a normal inability to leave the home and leaving the home requires considerable and taxing effort. Patient may leave the home for infrequent and short duration for medical reasons, and occasional absences for non-medical reasons. Homebound status is due to the following functional limitations: Patient's ambulation limited secondary to severe pain and requires the use of an assistive device and the assistance of a caregiver for safe completion. Patient with strength and ROM deficits limiting ambulation endurance requiring the use of an assistive device and the assistance of a caregiver. Patient deemed temporarily homebound secondary to increased risk for infection when leaving home and going out into the community. 3. I certify that this patient is under my care and that I, or a nurse practitioner or [facility], or clinical nurse specialist, or certified nurse midwife, working with me, had a Face-to-Face Encounter that meets the physician Face-to-Face Encounter requirements.   The following are the clinical findings from the [address] [place] [address] encounter that support the need for skilled services and is a summary of the encounter: see hospital chart        Gabe Jane Currie [employer], [address]  1/25/2019      THE FOLLOWING TO BE COMPLETED BY THE COMMUNITY PHYSICIAN:    I concur with the findings described above from the F2F encounter that this patient is homebound and in need of a skilled service.     Certifying Physician: _____________________________________      Printed Certifying Physician Name: _____________________________________    Date: _________________

## 2019-01-25 NOTE — PROGRESS NOTES
Post op interview conducted following LEFT KNEE ARTHROPLASTY TOTAL / MANDIE:  dated 1/24/19, no anesthetic complications noted

## 2019-01-25 NOTE — PROGRESS NOTES
Problem: Mobility Impaired (Adult and Pediatric)  Goal: *Acute Goals and Plan of Care (Insert Text)  GOALS (1-4 days):  (1.)Ms. Kimberlyn Tang will move from supine to sit and sit to supine  in bed with STAND BY ASSIST.  (2.)Ms. Kimberlyn Tang will transfer from bed to chair and chair to bed with STAND BY ASSIST using the least restrictive device. (3.)Ms. Harrington will ambulate with STAND BY ASSIST for 300 feet with the least restrictive device. (4.)Ms. Harrington will ambulate up/down 3 steps with bilateral  railing with CONTACT GUARD ASSIST with no device. (5.)Ms. Kimberlyn Tang will increase left knee ROM to 5°-80°.  ________________________________________________________________________________________________      PHYSICAL THERAPY Joint camp tKa: Daily Note, Discharge, Treatment Day: 1st, AM 1/25/2019  INPATIENT: Hospital Day: 2  Payor: Yuval Rodriguez / Plan: Maulik Bearden / Product Type: Commerical /      NAME/AGE/GENDER: Sandy Sparks is a 58 y.o. female   PRIMARY DIAGNOSIS:  Unilateral primary osteoarthritis, left knee [M17.12]   Procedure(s) and Anesthesia Type:     * LEFT KNEE ARTHROPLASTY TOTAL / MANDIE - General (Left)  ICD-10: Treatment Diagnosis:    · Pain in Left Knee (M25.562)  · Stiffness of Left Knee, Not elsewhere classified (M25.662)  · Difficulty in walking, Not elsewhere classified (R26.2)      ASSESSMENT:     Ms. Kimberlyn Tang showed increased gait distance & improved level of assist for transfers & gait. Pt is requesting to go home early. No questions from pt, prehab booklet delivered to pt. Ms. Lg Yates functional progress occurred more rapidly than expected as evidenced by goal attainment. She will be discharged to her home environment with a PT HEP, assistive device(s), and Home Health PT services. This section established at most recent assessment   PROBLEM LIST (Impairments causing functional limitations):  1. Decreased Strength  2. Decreased ADL/Functional Activities  3. Decreased Transfer Abilities  4.  Decreased Ambulation Ability/Technique  5. Edema/Girth  6. Decreased Warner with Home Exercise Program   INTERVENTIONS PLANNED: (Benefits and precautions of physical therapy have been discussed with the patient.)  1. Bed Mobility  2. Cold  3. Gait Training  4. Home Exercise Program (HEP)  5. Therapeutic Exercise/Strengthening  6. Transfer Training  7. Range of Motion: active/assisted/passive  8. Therapeutic Activities  9. Group Therapy     TREATMENT PLAN: Frequency/Duration: Follow patient BID for duration of hospital stay to address above goals. Rehabilitation Potential For Stated Goals: Good     RECOMMENDED REHABILITATION/EQUIPMENT: (at time of discharge pending progress): Continue Skilled Therapy, Home Health: Physical Therapy and Outpatient: Physical Therapy. HISTORY:   History of Present Injury/Illness (Reason for Referral):  Pt s/p left TKA on 19  Past Medical History/Comorbidities:   Ms. Kimberlyn Tang  has a past medical history of Angina, Arthritis, Bronchitis (? Aug 2013), CAD (coronary artery disease) (), Cancer [facility]), Chronic kidney disease, Chronic pain, COPD, COPD (chronic obstructive pulmonary disease) ([facility] (13), Diabetes ([facility] [address] GERD (gastroesophageal reflux disease), History of colon cancer (3/15/2013), Hypertension, Insomnia, Leiomyosarcoma of arm ([facility] (), Mixed hyperlipidemia (3/15/2013), Nicotine vapor product user, Psychiatric disorder, PUD (peptic ulcer disease), Renal insufficiency (3/15/2013), Scoliosis, congenital, Thyroid nodule (3/15/2013), and Type II or unspecified type diabetes mellitus without mention of complication, not stated as uncontrolled (3/15/2013). She also has no past medical history of Coagulation defects.   Ms. Kimberlyn Tang  has a past surgical history that includes pr abdomen surgery proc unlisted (); hx hernia repair (); hx cholecystectomy (); hx lumbar fusion ( or ); hx oophorectomy (); hx  section (1991); hx drea and bso (); hx back surgery (2005); hx back surgery (2005); hx back surgery (2005); hx cervical fusion (10/11); and hx knee replacement (5/11). Social History/Living Environment:   Home Environment: Private residence  # Steps to Enter: 2  One/Two Story Residence: One story  Living Alone: Yes  Support Systems: Family member(s), Friends \ neighbors  Patient Expects to be Discharged to[de-identified] Private residence  Current DME Used/Available at Home: None  Tub or Shower Type: Shower  Prior Level of Function/Work/Activity:  Pt living at home, independent with gait and ADLs without assistive devices   Number of Personal Factors/Comorbidities that affect the Plan of Care: 0: LOW COMPLEXITY   EXAMINATION:   Most Recent Physical Functioning:                  LLE PROM  L Knee Flexion: 55(~)  L Knee Extension: -15(~)         Bed Mobility  Supine to Sit: (NT)  Sit to Supine: (NT)  Scooting: Contact guard assistance    Transfers  Sit to Stand: Contact guard assistance  Stand to Sit: Contact guard assistance  Bed to Chair: Contact guard assistance    Balance  Sitting: Intact; Without support  Standing: Impaired; With support(walker)              Weight Bearing Status  Left Side Weight Bearing: As tolerated  Distance (ft): 100 Feet (ft)  Ambulation - Level of Assistance: Contact guard assistance  Assistive Device: Walker, rolling  Speed/Sonja: Delayed  Step Length: Right shortened  Stance: Left decreased  Gait Abnormalities: Antalgic;Decreased step clearance  Stairs - Level of Assistance: (pt did not feel the need to practice)     Braces/Orthotics: none    Left Knee Cold  Type: Cryocuff      Body Structures Involved:  1. Joints  2. Muscles Body Functions Affected:  1. Movement Related Activities and Participation Affected:  1. Mobility  2.  Self Care   Number of elements that affect the Plan of Care: 4+: HIGH COMPLEXITY   CLINICAL PRESENTATION:   Presentation: Stable and uncomplicated: LOW COMPLEXITY   CLINICAL DECISION MAKING:   [employer] AM-PAC 6 Clicks   Basic Mobility Inpatient Short Form  How much difficulty does the patient currently have. .. Unable A Lot A Little None   1. Turning over in bed (including adjusting bedclothes, sheets and blankets)? [] 1   [] 2   [x] 3   [] 4   2. Sitting down on and standing up from a chair with arms ( e.g., wheelchair, bedside commode, etc.)   [] 1   [] 2   [x] 3   [] 4   3. Moving from lying on back to sitting on the side of the bed? [] 1   [] 2   [x] 3   [] 4   How much help from another person does the patient currently need. .. Total A Lot A Little None   4. Moving to and from a bed to a chair (including a wheelchair)? [] 1   [] 2   [x] 3   [] 4   5. Need to walk in hospital room? [] 1   [] 2   [x] 3   [] 4   6. Climbing 3-5 steps with a railing? [] 1   [] 2   [x] 3   [] 4   © 2007, Trustees of [address] [place] [address] [facility], under license to [employer]. All rights reserved        Score:  Initial: 18 Most Recent: 18 (Date: 1/25/19 )    Interpretation of Tool:  Represents activities that are increasingly more difficult (i.e. Bed mobility, Transfers, Gait). Score 24 23 22-20 19-15 14-10 9-7 6     Modifier CH CI CJ CK CL CM CN      ? Mobility - Walking and Moving Around:     - CURRENT STATUS: CK - 40%-59% impaired, limited or restricted    - GOAL STATUS: CI - 1%-19% impaired, limited or restricted    - D/C STATUS:  CK - 40%-59% impaired, limited or restricted  Payor: Tyler Felder / Plan: G[employer] OAP / Product Type: Commerical /      Medical Necessity:     · Patient is expected to demonstrate progress in strength, range of motion and functional technique to decrease assistance required with functional mobility and TKA exercises. Reason for Services/Other Comments:  · Patient continues to require skilled intervention due to inability to complete functional mobility and TKA exercises indepenently.    Use of outcome tool(s) and clinical judgement create a POC that gives a: Clear prediction of patient's progress: LOW COMPLEXITY            TREATMENT:   (In addition to Assessment/Re-Assessment sessions the following treatments were rendered)     Pre-treatment Symptoms/Complaints:  Pt ready to return home  Pain Initial: visual scale  Pain Intensity 1: 3  Pain Location 1: Knee  Pain Orientation 1: Left  Pain Intervention(s) 1: Ambulation/Increased Activity, Cold pack  Post Session:  3/10     Therapeutic Exercise: (13 Minutes):  Exercises per grid below to improve mobility and strength. Required minimal verbal and manual cues to promote proper body alignment and promote proper body posture. Progressed repetitions as indicated. Gait Training (13 Minutes):  Gait training to improve and/or restore physical functioning as related to mobility, strength, balance, coordination and dynamic movement of leg - left to improve functional gait. Ambulated 100 Feet (ft) with Contact guard assistance using a Walker, rolling and minimal cues related to their stride length and heel strike to promote proper body alignment, promote proper body posture and promote proper body mechanics.         Date:  1/24/19 Date:  1/25 Date:     ACTIVITY/EXERCISE AM PM AM PM AM PM   GROUP THERAPY  []  []  []  []  []  []   Ankle Pumps  10 15      Quad Sets  10 15      Gluteal Sets  10 15      Hip ABd/ADduction  10 15      Straight Leg Raises  10 15      Knee Slides  10 15      Short Arc Quads   15      Long Arc Quads         Chair Slides                  B = bilateral; AA = active assistive; A = active; P = passive      Treatment/Session Assessment:     Response to Treatment:  Pt not pleased with pain control    Education:  [x] Home Exercises  [x] Fall Precautions  [x] no pillow under knee [] D/C Instruction Review  [] Knee Prosthesis Review  [x] Walker Management/Safety [] Adaptive Equipment as Needed       Interdisciplinary Collaboration:   o Registered Nurse    After treatment position/precautions:   o Up in chair  o Bed/Chair-wheels locked  o Caregiver at bedside  o Call light within reach  o RN notified  o Family at bedside    Compliance with Program/Exercises: Compliant all of the time, Will assess as treatment progresses. Recommendations/Intent for next treatment session:  Treatment next visit will focus on increasing Ms. Harrington's independence with bed mobility, transfers, gait training, strength/ROM exercises, modalities for pain, and patient education.       Total Treatment Duration:  PT Patient Time In/Time Out  Time In: 3804  Time Out: [address] [facility] Shalonda Beebe, SHERRY

## 2019-01-25 NOTE — PROGRESS NOTES
Patient placed on continuous non-tyransmitting sat monitor HS per protocol. Monitor history deleted prior to placing on patient.

## 2019-01-25 NOTE — PROGRESS NOTES
Hospitalist Consult Note      Patient: Doyle Tillman               Sex: female             MRN: [id number]      YOB: 1955      Age:  58 y.o. Reason for Consult:  Medical Managment      Subjective  This is a 58-year-old female with a past medical history of diabetes, COPD, hypertension, anxiety, depression presents to the hospital for left total knee arthroplasty. Patient is doing well, pain in the Left knee better, denies any Chest pain, SOB, Palpitations. No Nausea or vomiting  VS reviewed as below      Physical Exam     Visit Vitals  /61   Pulse 93   Temp 98.2 °F (36.8 °C)   Resp 16   Ht 5' 1\" (1.549 m)   Wt 66.2 kg (145 lb 16 oz)   SpO2 97%   Breastfeeding?  No   BMI 27.59 kg/m²      Temp (24hrs), Av.1 °F (36.7 °C), Min:97.9 °F (36.6 °C), Max:98.6 °F (37 °C)    Oxygen Therapy  O2 Sat (%): 97 % (19 0716)  Pulse via Oximetry: 105 beats per minute (19)  O2 Device: Room air (19)  O2 Flow Rate (L/min): 0 l/min (19)  FIO2 (%): 21 % (19)    Intake/Output Summary (Last 24 hours) at 2019 0912  Last data filed at 2019 8930  Gross per 24 hour   Intake 2220 ml   Output 4700 ml   Net -2480 ml          General:- Conscious, No acute distress   Lungs- CTA Bilaterally, No significant wheezing  Heart:- S1 S2 regular  Abdomen:- Soft, Positive bowel sounds,   Skin: - No acute rashes  Musculoskeletal: Left knee in dressing  Psych: - Appropriate mood    LAB  Recent Results (from the past 24 hour(s))   GLUCOSE, POC    Collection Time: 19  9:42 AM   Result Value Ref Range    Glucose (POC) 92 65 - 100 mg/dL   TYPE & SCREEN    Collection Time: 19  9:43 AM   Result Value Ref Range    Crossmatch Expiration 2019     ABO/Rh(D) Sherral Larve NEGATIVE     Antibody screen NEG    HEMOGLOBIN    Collection Time: 19  7:55 PM   Result Value Ref Range    HGB 12.4 11.7 - 15.4 g/dL   GLUCOSE, POC    Collection Time: 19 10:16 PM Result Value Ref Range    Glucose (POC) 321 (H) 65 - 100 mg/dL   HEMOGLOBIN    Collection Time: 01/25/19  4:12 AM   Result Value Ref Range    HGB 11.6 (L) 11.7 - 15.4 g/dL   GLUCOSE, POC    Collection Time: 01/25/19  7:12 AM   Result Value Ref Range    Glucose (POC) 194 (H) 65 - 100 mg/dL       IMAGING:     No results found. Assessment/Plan     Active Problems:    Arthritis of left knee (1/24/2019)        1. RT TKA- DVT prophylaxis, PT/OT, pain management as per Ortho service    2. HTN- patient is currently on metoprolol 50 mg once daily, amlodipine 5 mg once daily, blood pressure slightly on the high side will order IV hydralazine as needed, holding off on Hyzaar 50/12.5 mg once daily can resume at discharge     3. DM- For her diabetes patient is on Traceba 62 units once daily at home, Ordered lantus 45U while she is here, FSG stable,   Order for Sliding scale insulin, resume home dosage at time of discharge      Medically stable, will sign off call us with questions    Amarilys Crespo MD  January 25, 2019

## 2019-01-25 NOTE — PROGRESS NOTES
2019         Post Op day: 1 Day Post-Op   Admit Diagnosis: Unilateral primary osteoarthritis, left knee [M17.12]  LAB:    Recent Results (from the past 24 hour(s))   GLUCOSE, POC    Collection Time: 19  9:42 AM   Result Value Ref Range    Glucose (POC) 92 65 - 100 mg/dL   TYPE & SCREEN    Collection Time: 19  9:43 AM   Result Value Ref Range    Crossmatch Expiration 2019     ABO/Rh(D) Brando Shake NEGATIVE     Antibody screen NEG    HEMOGLOBIN    Collection Time: 19  7:55 PM   Result Value Ref Range    HGB 12.4 11.7 - 15.4 g/dL   GLUCOSE, POC    Collection Time: 19 10:16 PM   Result Value Ref Range    Glucose (POC) 321 (H) 65 - 100 mg/dL   HEMOGLOBIN    Collection Time: 19  4:12 AM   Result Value Ref Range    HGB 11.6 (L) 11.7 - 15.4 g/dL   GLUCOSE, POC    Collection Time: 19  7:12 AM   Result Value Ref Range    Glucose (POC) 194 (H) 65 - 100 mg/dL     Vital Signs:    Patient Vitals for the past 8 hrs:   BP Temp Pulse Resp SpO2   19 0716 151/61 98.2 °F (36.8 °C) 93 16 97 %   19 0341 143/75 98 °F (36.7 °C) 91 16 96 %   19 2352 161/76 97.9 °F (36.6 °C) 96 16 97 %     Temp (24hrs), Av.1 °F (36.7 °C), Min:97.9 °F (36.6 °C), Max:98.6 °F (37 °C)    Pain Control:   Pain Assessment  Pain Scale 1: Numeric (0 - 10)  Pain Intensity 1: 10  Pain Onset 1: years  Pain Location 1: Knee  Pain Description 1: Aching  Subjective: Doing well, no complaints     Objective:  No Acute Distress, Alert and Oriented, Neurovascular exam is normal       Assessment:   Patient Active Problem List   Diagnosis Code    Hypertension I10    GERD (gastroesophageal reflux disease) K21.9    PUD (peptic ulcer disease) K27.9    Insomnia G47.00    Arthritis M19.90    Chronic pain G89.29    CAD (coronary artery disease) I25.10    Scoliosis, congenital Q67.5    Renal insufficiency N28.9    Type II or unspecified type diabetes mellitus without mention of complication, not stated as uncontrolled E11.9    History of colon cancer Z85.038    Mixed hyperlipidemia E78.2    Thyroid nodule E04.1    COPD (chronic obstructive pulmonary disease) ([facility]) J44.9    Urethral stenosis [id number]    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    Falls frequently R29.6    Back pain M54.9    Chronic narcotic dependence ([facility]) F11.20    DM (diabetes mellitus) ([facility]) E11.9    Leukocytosis D72.829    Bacteremia R78.81    HTN (hypertension) I10    Hypokalemia E87.6    MDD (major depressive disorder) F32.9    Constipation K59.00    Type 2 diabetes mellitus without complication ([facility]) G61.4    Major depressive disorder, recurrent episode, moderate ([facility]) F33.1    Depression F32.9    Tremor R25.1    Edema of both legs R60.0    Post-traumatic osteoarthritis of right knee V05.09    Umbilical hernia without obstruction and without gangrene K42.9    Type 2 diabetes with nephropathy ([facility]) E11.21    Preventative health care Z00.00    Cancer ([facility]) C80.1    Melanocytic nevus of trunk D22.5    Osteoarthritis of both knees M17.0    Arthritis of left knee M17.12       Status Post Procedure(s) (LRB):  LEFT KNEE ARTHROPLASTY TOTAL / MANDIE (Left)        Plan: Continue Physical Therapy, Monitor Hgb. Home with pain meds from from her pain management team.  Home today.    Signed By: Nilam Echevarria MD

## 2019-01-25 NOTE — DISCHARGE INSTRUCTIONS
[facility]   Patient Discharge Instructions    Filipe Funez / [id number] : 1955    Admitted 2019 Discharged: 2019     IF YOU HAVE ANY PROBLEMS ONCE YOU ARE AT HOME CALL THE FOLLOWING NUMBERS:   Main office number: [phone]    Take Home Medications     · It is important that you take the medication exactly as they are prescribed. · Keep your medication in the bottles provided by the pharmacist and keep a list of the medication names, dosages, and times to be taken in your wallet. · Do not take other medications without consulting your doctor. What to do at [address] your prehospital diet. If you have excessive nausea or vomitting call your doctor's office     Home Physical Therapy is arranged. Use rolling walker when walking. Patients who have had a joint replacement should not drive until you are seen for your follow up appointment by Dr. Jania Keys. When to Call    - Call if you have a temperature greater then 101  - Unable to keep food down  - Loose control of your bladder or bowel function  - Are unable to bear any weight   - Need a pain medication refill       DISCHARGE SUMMARY from Nurse    The following personal items collected during your admission are returned to you:   Dental Appliance: Dental Appliances: Other (comment)(plates removed and given to friend)  Vision: Visual Aid: Glasses  Hearing Aid:  na  Jewelry:   na  Clothing: Clothing: Other (comment)  Other Valuables: Other Valuables:  Other (comment)(with friends or in car)  Valuables sent to safe:   na    PATIENT INSTRUCTIONS:    After general anesthesia or intravenous sedation, for 24 hours or while taking prescription Narcotics:  · Limit your activities  · Do not drive and operate hazardous machinery  · Do not make important personal or business decisions  · Do  not drink alcoholic beverages  · If you have not urinated within 8 hours after discharge, please contact your surgeon on call.    Report the following to your surgeon:  · Excessive pain, swelling, redness or odor of or around the surgical area  · Temperature over 101  · Nausea and vomiting lasting longer than 4 hours or if unable to take medications  · Any signs of decreased circulation or nerve impairment to extremity: change in color, persistent  numbness, tingling, coldness or increase pain  · Any questions, call office @ [phone]      Keep scheduled follow up appointment. If need to change, call office @ [phone]. *  Please give a list of your current medications to your Primary Care Provider. *  Please update this list whenever your medications are discontinued, doses are      changed, or new medications (including over-the-counter products) are added. *  Please carry medication information at all times in case of emergency situations. Patient Education        Total Knee Replacement: What to Expect at Home  Your Recovery    When you leave the hospital, you should be able to move around with a walker or crutches. But you will need someone to help you at home for the next few weeks or until you have more energy and can move around better. If there is no one to help you at home, you may go to a rehabilitation center. You will go home with a bandage and stitches, staples, tissue glue, or tape strips. Change the bandage as your doctor tells you to. If you have stitches or staples, your doctor will remove them 10 to 21 days after your surgery. Glue or tape strips will fall off on their own over time. You may still have some mild pain, and the area may be swollen for 3 to 6 months after surgery. Your knee will continue to improve for 6 to 12 months. You will probably use a walker for 1 to 3 weeks and then use crutches. When you are ready, you can use a cane. You will probably be able to walk on your own in 4 to 8 weeks. You will need to do months of physical rehabilitation (rehab) after a knee replacement.  Rehab will help you strengthen the muscles of the knee and help you regain movement. After you recover, your artificial knee will allow you to do normal daily activities with less pain or no pain at all. You may be able to hike, dance, ride a bike, and play golf. Talk to your doctor about whether you can do more strenuous activities. Always tell your caregivers that you have an artificial knee. How long it will take to walk on your own, return to normal activities, and go back to work depends on your health and how well your rehabilitation (rehab) program goes. The better you do with your rehab exercises, the quicker you will get your strength and movement back. This care sheet gives you a general idea about how long it will take for you to recover. But each person recovers at a different pace. Follow the steps below to get better as quickly as possible. How can you care for yourself at home? Activity    · Rest when you feel tired. You may take a nap, but do not stay in bed all day. When you sit, use a chair with arms. You can use the arms to help you stand up.     · Work with your physical therapist to find the best way to exercise. You may be able to take frequent, short walks using crutches or a walker. What you can do as your knee heals will depend on whether your new knee is cemented or uncemented. You may not be able to do certain things for a while if your new knee is uncemented.     · After your knee has healed enough, you can do more strenuous activities with caution. ? You can golf, but use a golf cart, and do not wear shoes with spikes. ? You can bike on a flat road or on a stationary bike. Avoid biking up hills. ? Your doctor may suggest that you stay away from activities that put stress on your knee. These include tennis or badminton, squash or racquetball, contact sports like football, jumping (such as in basketball), jogging, or running. ? Avoid activities where you might fall.  These include horseback riding, skiing, and mountain biking.     · Do not sit for more than 1 hour at a time. Get up and walk around for a while before you sit again. If you must sit for a long time, prop up your leg with a chair or footstool. This will help you avoid swelling.     · Ask your doctor when you can drive again. It may take up to 8 weeks after knee replacement surgery before it is safe for you to drive.     · When you get into a car, sit on the edge of the seat. Then pull in your legs, and turn to face the front.     · You should be able to do many everyday activities 3 to 6 weeks after your surgery. You will probably need to take 4 to 16 weeks off from work. When you can go back to work depends on the type of work you do and how you feel.     · Ask your doctor when it is okay for you to have sex.     · Do not lift anything heavier than 10 pounds and do not lift weights for 12 weeks. Diet    · By the time you leave the hospital, you should be eating your normal diet. If your stomach is upset, try bland, low-fat foods like plain rice, broiled chicken, toast, and yogurt. Your doctor may suggest that you take iron and vitamin supplements.     · Drink plenty of fluids (unless your doctor tells you not to).   · Eat healthy foods, and watch your portion sizes. Try to stay at your ideal weight. Too much weight puts more stress on your new knee.     · You may notice that your bowel movements are not regular right after your surgery. This is common. Try to avoid constipation and straining with bowel movements. You may want to take a fiber supplement every day. If you have not had a bowel movement after a couple of days, ask your doctor about taking a mild laxative. Medicines    · Your doctor will tell you if and when you can restart your medicines.  He or she will also give you instructions about taking any new medicines.     · If you take blood thinners, such as warfarin (Coumadin), clopidogrel (Plavix), or aspirin, be sure to talk to your doctor. He or she will tell you if and when to start taking those medicines again. Make sure that you understand exactly what your doctor wants you to do.     · Your doctor may give you a blood-thinning medicine to prevent blood clots. If you take a blood thinner, be sure you get instructions about how to take your medicine safely. Blood thinners can cause serious bleeding problems. This medicine could be in pill form or as a shot (injection). If a shot is necessary, your doctor will tell you how to do this.     · Be safe with medicines. Take pain medicines exactly as directed. ? If the doctor gave you a prescription medicine for pain, take it as prescribed. ? If you are not taking a prescription pain medicine, ask your doctor if you can take an over-the-counter medicine. ? Plan to take your pain medicine 30 minutes before exercises. It is easier to prevent pain before it starts than to stop it once it has started.     · If you think your pain medicine is making you sick to your stomach:  ? Take your medicine after meals (unless your doctor has told you not to). ? Ask your doctor for a different pain medicine.     · If your doctor prescribed antibiotics, take them as directed. Do not stop taking them just because you feel better. You need to take the full course of antibiotics. Incision care    · If your doctor told you how to care for your cut (incision), follow your doctor's instructions. You will have a dressing over the cut. A dressing helps the incision heal and protects it. Your doctor will tell you how to take care of this.     · If you did not get instructions, follow this general advice:  ? If you have strips of tape on the cut the doctor made, leave the tape on for a week or until it falls off.  ? If you have stitches or staples, your doctor will tell you when to come back to have them removed. ? If you have skin adhesive on the cut, leave it on until it falls off.  Skin adhesive is also called glue or liquid stitches. ? Change the bandage every day. ? Wash the area daily with warm water, and pat it dry. Don't use hydrogen peroxide or alcohol. They can slow healing. ? You may cover the area with a gauze bandage if it oozes fluid or rubs against clothing. ? You may shower 24 to 48 hours after surgery. Pat the incision dry. Don't swim or take a bath for the first 2 weeks, or until your doctor tells you it is okay. Exercise    · Your rehab program will give you a number of exercises to do to help you get back your knee's range of motion and strength. Always do them as your therapist tells you. Ice and elevation    · For pain and swelling, put ice or a cold pack on the area for 10 to 20 minutes at a time. Put a thin cloth between the ice and your skin. Other instructions    · Continue to wear your support stockings as your doctor says. These help to prevent blood clots. The length of time that you will have to wear them depends on your activity level and the amount of swelling.     · You have metal pieces in your knee. These may set off some airport metal detectors. Carry a medical alert card that says you have an artificial joint, just in case. Follow-up care is a key part of your treatment and safety. Be sure to make and go to all appointments, and call your doctor if you are having problems. It's also a good idea to know your test results and keep a list of the medicines you take. When should you call for help? Call 911 anytime you think you may need emergency care. For example, call if:    · You passed out (lost consciousness).     · You have severe trouble breathing.     · You have sudden chest pain and shortness of breath, or you cough up blood.    Call your doctor now or seek immediate medical care if:    · You have signs of infection, such as:  ? Increased pain, swelling, warmth, or redness. ? Red streaks leading from the incision. ? Pus draining from the incision. ? A fever.   · You have signs of a blood clot, such as:  ? Pain in your calf, back of the knee, thigh, or groin. ? Redness and swelling in your leg or groin.     · Your incision comes open and begins to bleed, or the bleeding increases.     · You have pain that does not get better after you take pain medicine.    Watch closely for changes in your health, and be sure to contact your doctor if:    · You do not have a bowel movement after taking a laxative. Where can you learn more? Go to http://kenya-martha.info/. Enter Y630 in the search box to learn more about \"Total Knee Replacement: What to Expect at Home. \"  Current as of: September 20, 2018  Content Version: 11.9  © 1387-6520 [employer]. Care instructions adapted under license by [employer] (which disclaims liability or warranty for this information). If you have questions about a medical condition or this instruction, always ask your healthcare professional. [place] [address] any warranty or liability for your use of this information. These are general instructions for a healthy lifestyle:    No smoking/ No tobacco products/ Avoid exposure to second hand smoke    Surgeon General's Warning:  Quitting smoking now greatly reduces serious risk to your health. Obesity, smoking, and sedentary lifestyle greatly increases your risk for illness    A healthy diet, regular physical exercise & weight monitoring are important for maintaining a healthy lifestyle    You may be retaining fluid if you have a history of heart failure or if you experience any of the following symptoms:  Weight gain of 3 pounds or more overnight or 5 pounds in a week, increased swelling in our hands or feet or shortness of breath while lying flat in bed. Please call your doctor as soon as you notice any of these symptoms; do not wait until your next office visit.     Recognize signs and symptoms of STROKE:    F-face looks uneven    A-arms unable to move or move even    S-speech slurred or non-existent    T-time-call 911 as soon as signs and symptoms begin-DO NOT go       Back to bed or wait to see if you get better-TIME IS BRAIN. The discharge information has been reviewed with the patient. The patient verbalized understanding. Information obtained by :  I understand that if any problems occur once I am at home I am to contact my physician. I understand and acknowledge receipt of the instructions indicated above.                                                                                                                                            Physician's or R.N.'s Signature                                                                  Date/Time                                                                                                                                              Patient or Representative Signature                                                          Date/Time

## 2019-01-28 ENCOUNTER — HOME CARE VISIT (OUTPATIENT)
Dept: SCHEDULING | Facility: HOME HEALTH | Age: 64
End: 2019-01-28
Payer: COMMERCIAL

## 2019-01-28 PROCEDURE — G0157 HHC PT ASSISTANT EA 15: HCPCS

## 2019-01-29 VITALS
RESPIRATION RATE: 18 BRPM | TEMPERATURE: 97.9 F | SYSTOLIC BLOOD PRESSURE: 140 MMHG | DIASTOLIC BLOOD PRESSURE: 82 MMHG | HEART RATE: 82 BPM

## 2019-01-31 ENCOUNTER — HOME CARE VISIT (OUTPATIENT)
Dept: SCHEDULING | Facility: HOME HEALTH | Age: 64
End: 2019-01-31
Payer: COMMERCIAL

## 2019-01-31 VITALS
DIASTOLIC BLOOD PRESSURE: 60 MMHG | TEMPERATURE: 97.6 F | RESPIRATION RATE: 17 BRPM | SYSTOLIC BLOOD PRESSURE: 130 MMHG | HEART RATE: 80 BPM

## 2019-01-31 PROCEDURE — G0157 HHC PT ASSISTANT EA 15: HCPCS

## 2019-02-01 ENCOUNTER — HOME CARE VISIT (OUTPATIENT)
Dept: SCHEDULING | Facility: HOME HEALTH | Age: 64
End: 2019-02-01
Payer: COMMERCIAL

## 2019-02-01 VITALS
RESPIRATION RATE: 17 BRPM | HEART RATE: 80 BPM | SYSTOLIC BLOOD PRESSURE: 102 MMHG | TEMPERATURE: 97.4 F | DIASTOLIC BLOOD PRESSURE: 62 MMHG

## 2019-02-01 PROCEDURE — G0157 HHC PT ASSISTANT EA 15: HCPCS

## 2019-02-04 ENCOUNTER — HOME CARE VISIT (OUTPATIENT)
Dept: SCHEDULING | Facility: HOME HEALTH | Age: 64
End: 2019-02-04
Payer: COMMERCIAL

## 2019-02-04 PROCEDURE — G0157 HHC PT ASSISTANT EA 15: HCPCS

## 2019-02-05 ENCOUNTER — HOME CARE VISIT (OUTPATIENT)
Dept: SCHEDULING | Facility: HOME HEALTH | Age: 64
End: 2019-02-05
Payer: COMMERCIAL

## 2019-02-05 VITALS
SYSTOLIC BLOOD PRESSURE: 112 MMHG | TEMPERATURE: 97.7 F | HEART RATE: 80 BPM | RESPIRATION RATE: 17 BRPM | DIASTOLIC BLOOD PRESSURE: 62 MMHG

## 2019-02-05 PROCEDURE — A4649 SURGICAL SUPPLIES: HCPCS

## 2019-02-05 PROCEDURE — G0157 HHC PT ASSISTANT EA 15: HCPCS

## 2019-02-06 VITALS
DIASTOLIC BLOOD PRESSURE: 60 MMHG | HEART RATE: 80 BPM | SYSTOLIC BLOOD PRESSURE: 122 MMHG | RESPIRATION RATE: 17 BRPM | TEMPERATURE: 97.4 F

## 2019-02-07 ENCOUNTER — HOME CARE VISIT (OUTPATIENT)
Dept: SCHEDULING | Facility: HOME HEALTH | Age: 64
End: 2019-02-07
Payer: COMMERCIAL

## 2019-02-07 VITALS
SYSTOLIC BLOOD PRESSURE: 132 MMHG | TEMPERATURE: 97.3 F | DIASTOLIC BLOOD PRESSURE: 70 MMHG | RESPIRATION RATE: 17 BRPM | HEART RATE: 80 BPM

## 2019-02-07 PROCEDURE — G0157 HHC PT ASSISTANT EA 15: HCPCS

## 2019-02-12 ENCOUNTER — HOME CARE VISIT (OUTPATIENT)
Dept: HOME HEALTH SERVICES | Facility: HOME HEALTH | Age: 64
End: 2019-02-12
Payer: COMMERCIAL

## 2019-02-12 PROCEDURE — G0151 HHCP-SERV OF PT,EA 15 MIN: HCPCS

## 2019-02-13 ENCOUNTER — HOME CARE VISIT (OUTPATIENT)
Dept: SCHEDULING | Facility: HOME HEALTH | Age: 64
End: 2019-02-13
Payer: COMMERCIAL

## 2019-02-13 PROCEDURE — G0151 HHCP-SERV OF PT,EA 15 MIN: HCPCS

## 2019-02-14 VITALS
RESPIRATION RATE: 21 BRPM | TEMPERATURE: 98.1 F | SYSTOLIC BLOOD PRESSURE: 122 MMHG | DIASTOLIC BLOOD PRESSURE: 76 MMHG | HEART RATE: 78 BPM

## 2019-02-14 VITALS
TEMPERATURE: 97.3 F | RESPIRATION RATE: 17 BRPM | SYSTOLIC BLOOD PRESSURE: 128 MMHG | HEART RATE: 79 BPM | DIASTOLIC BLOOD PRESSURE: 76 MMHG

## 2020-03-02 ENCOUNTER — HOSPITAL ENCOUNTER (OUTPATIENT)
Dept: GENERAL RADIOLOGY | Age: 65
Discharge: HOME | End: 2020-03-02
Payer: COMMERCIAL

## 2020-03-02 DIAGNOSIS — M25.512 LEFT SHOULDER PAIN: ICD-10-CM

## 2020-03-02 DIAGNOSIS — M25.511 RIGHT SHOULDER PAIN: ICD-10-CM

## 2020-03-02 PROCEDURE — 73030 X-RAY EXAM OF SHOULDER: CPT

## 2020-04-13 ENCOUNTER — APPOINTMENT (OUTPATIENT)
Dept: CT IMAGING | Age: 65
End: 2020-04-13
Attending: EMERGENCY MEDICINE
Payer: MEDICARE

## 2020-04-13 ENCOUNTER — APPOINTMENT (OUTPATIENT)
Dept: GENERAL RADIOLOGY | Age: 65
End: 2020-04-13
Attending: EMERGENCY MEDICINE
Payer: MEDICARE

## 2020-04-13 ENCOUNTER — HOSPITAL ENCOUNTER (EMERGENCY)
Age: 65
Discharge: HOME | End: 2020-04-13
Attending: EMERGENCY MEDICINE
Payer: MEDICARE

## 2020-04-13 VITALS
WEIGHT: 142 LBS | TEMPERATURE: 98.1 F | BODY MASS INDEX: 27.88 KG/M2 | HEART RATE: 89 BPM | HEIGHT: 60 IN | RESPIRATION RATE: 20 BRPM | DIASTOLIC BLOOD PRESSURE: 70 MMHG | SYSTOLIC BLOOD PRESSURE: 146 MMHG | OXYGEN SATURATION: 92 %

## 2020-04-13 DIAGNOSIS — K56.7 ILEUS (HCC): Primary | ICD-10-CM

## 2020-04-13 LAB
ALBUMIN SERPL-MCNC: 3.2 G/DL (ref 3.2–4.6)
ALBUMIN/GLOB SERPL: 0.8 {RATIO} (ref 1.2–3.5)
ALP SERPL-CCNC: 93 U/L (ref 50–136)
ALT SERPL-CCNC: 15 U/L (ref 12–65)
ANION GAP SERPL CALC-SCNC: 6 MMOL/L (ref 7–16)
AST SERPL-CCNC: 7 U/L (ref 15–37)
BACTERIA URNS QL MICRO: ABNORMAL /HPF
BASOPHILS # BLD: 0 K/UL (ref 0–0.2)
BASOPHILS NFR BLD: 0 % (ref 0–2)
BILIRUB SERPL-MCNC: 0.4 MG/DL (ref 0.2–1.1)
BUN SERPL-MCNC: 10 MG/DL (ref 8–23)
CALCIUM SERPL-MCNC: 8.9 MG/DL (ref 8.3–10.4)
CASTS URNS QL MICRO: ABNORMAL /LPF
CHLORIDE SERPL-SCNC: 98 MMOL/L (ref 98–107)
CO2 SERPL-SCNC: 30 MMOL/L (ref 21–32)
CREAT SERPL-MCNC: 0.78 MG/DL (ref 0.6–1)
CRYSTALS URNS QL MICRO: 0 /LPF
DIFFERENTIAL METHOD BLD: ABNORMAL
EOSINOPHIL # BLD: 0.2 K/UL (ref 0–0.8)
EOSINOPHIL NFR BLD: 1 % (ref 0.5–7.8)
EPI CELLS #/AREA URNS HPF: ABNORMAL /HPF
ERYTHROCYTE [DISTWIDTH] IN BLOOD BY AUTOMATED COUNT: 14.5 % (ref 11.9–14.6)
GLOBULIN SER CALC-MCNC: 4.2 G/DL (ref 2.3–3.5)
GLUCOSE SERPL-MCNC: 138 MG/DL (ref 65–100)
HCT VFR BLD AUTO: 46.4 % (ref 35.8–46.3)
HGB BLD-MCNC: 15 G/DL (ref 11.7–15.4)
IMM GRANULOCYTES # BLD AUTO: 0 K/UL (ref 0–0.5)
IMM GRANULOCYTES NFR BLD AUTO: 0 % (ref 0–5)
LIPASE SERPL-CCNC: 19 U/L (ref 73–393)
LYMPHOCYTES # BLD: 4.6 K/UL (ref 0.5–4.6)
LYMPHOCYTES NFR BLD: 28 % (ref 13–44)
MCH RBC QN AUTO: 28.1 PG (ref 26.1–32.9)
MCHC RBC AUTO-ENTMCNC: 32.3 G/DL (ref 31.4–35)
MCV RBC AUTO: 86.9 FL (ref 79.6–97.8)
MONOCYTES # BLD: 0.8 K/UL (ref 0.1–1.3)
MONOCYTES NFR BLD: 5 % (ref 4–12)
MUCOUS THREADS URNS QL MICRO: 0 /LPF
NEUTS SEG # BLD: 11 K/UL (ref 1.7–8.2)
NEUTS SEG NFR BLD: 66 % (ref 43–78)
NRBC # BLD: 0 K/UL (ref 0–0.2)
OTHER OBSERVATIONS,UCOM: ABNORMAL
PLATELET # BLD AUTO: 348 K/UL (ref 150–450)
PLATELET COMMENTS,PCOM: ADEQUATE
PMV BLD AUTO: 8 FL (ref 9.4–12.3)
POTASSIUM SERPL-SCNC: 2.9 MMOL/L (ref 3.5–5.1)
PROT SERPL-MCNC: 7.4 G/DL (ref 6.3–8.2)
RBC # BLD AUTO: 5.34 M/UL (ref 4.05–5.2)
RBC #/AREA URNS HPF: ABNORMAL /HPF
RBC MORPH BLD: ABNORMAL
RBC MORPH BLD: ABNORMAL
SODIUM SERPL-SCNC: 134 MMOL/L (ref 136–145)
WBC # BLD AUTO: 16.6 K/UL (ref 4.3–11.1)
WBC MORPH BLD: ABNORMAL
WBC URNS QL MICRO: ABNORMAL /HPF

## 2020-04-13 PROCEDURE — 85025 COMPLETE CBC W/AUTO DIFF WBC: CPT

## 2020-04-13 PROCEDURE — 81003 URINALYSIS AUTO W/O SCOPE: CPT

## 2020-04-13 PROCEDURE — 81015 MICROSCOPIC EXAM OF URINE: CPT

## 2020-04-13 PROCEDURE — 96374 THER/PROPH/DIAG INJ IV PUSH: CPT

## 2020-04-13 PROCEDURE — 71046 X-RAY EXAM CHEST 2 VIEWS: CPT

## 2020-04-13 PROCEDURE — 99284 EMERGENCY DEPT VISIT MOD MDM: CPT

## 2020-04-13 PROCEDURE — 74011636320 HC RX REV CODE- 636/320: Performed by: EMERGENCY MEDICINE

## 2020-04-13 PROCEDURE — 74177 CT ABD & PELVIS W/CONTRAST: CPT

## 2020-04-13 PROCEDURE — 80053 COMPREHEN METABOLIC PANEL: CPT

## 2020-04-13 PROCEDURE — 83690 ASSAY OF LIPASE: CPT

## 2020-04-13 PROCEDURE — 96375 TX/PRO/DX INJ NEW DRUG ADDON: CPT

## 2020-04-13 PROCEDURE — 74011250636 HC RX REV CODE- 250/636: Performed by: EMERGENCY MEDICINE

## 2020-04-13 PROCEDURE — 74011000258 HC RX REV CODE- 258: Performed by: EMERGENCY MEDICINE

## 2020-04-13 RX ORDER — METOCLOPRAMIDE 10 MG/1
10 TABLET ORAL
Qty: 12 TAB | Refills: 0 | Status: SHIPPED | OUTPATIENT
Start: 2020-04-13 | End: 2020-04-23

## 2020-04-13 RX ORDER — DICYCLOMINE HYDROCHLORIDE 10 MG/1
10 CAPSULE ORAL 4 TIMES DAILY
Qty: 20 CAP | Refills: 0 | Status: SHIPPED | OUTPATIENT
Start: 2020-04-13 | End: 2020-04-18

## 2020-04-13 RX ORDER — CEPHALEXIN 500 MG/1
500 CAPSULE ORAL 3 TIMES DAILY
Qty: 30 CAP | Refills: 0 | Status: SHIPPED | OUTPATIENT
Start: 2020-04-13 | End: 2020-08-21 | Stop reason: SDUPTHER

## 2020-04-13 RX ORDER — METOCLOPRAMIDE HYDROCHLORIDE 5 MG/ML
10 INJECTION INTRAMUSCULAR; INTRAVENOUS
Status: COMPLETED | OUTPATIENT
Start: 2020-04-13 | End: 2020-04-13

## 2020-04-13 RX ORDER — SODIUM CHLORIDE 0.9 % (FLUSH) 0.9 %
10 SYRINGE (ML) INJECTION
Status: COMPLETED | OUTPATIENT
Start: 2020-04-13 | End: 2020-04-13

## 2020-04-13 RX ADMIN — SODIUM CHLORIDE 100 ML: 900 INJECTION, SOLUTION INTRAVENOUS at 20:06

## 2020-04-13 RX ADMIN — IOPAMIDOL 100 ML: 755 INJECTION, SOLUTION INTRAVENOUS at 20:06

## 2020-04-13 RX ADMIN — Medication 10 ML: at 20:06

## 2020-04-13 RX ADMIN — METOCLOPRAMIDE HYDROCHLORIDE 10 MG: 5 INJECTION INTRAMUSCULAR; INTRAVENOUS at 21:08

## 2020-04-13 RX ADMIN — CEFTRIAXONE 1 G: 1 INJECTION, POWDER, FOR SOLUTION INTRAMUSCULAR; INTRAVENOUS at 21:07

## 2020-04-13 NOTE — ED TRIAGE NOTES
Pt reports diffuse cramping abd pain onset 2 days ago. States that she was constipated but was about to have a bowel movement today. Denies urinary complaint, fever, chills or SOB. Pt masked in triage.

## 2020-04-14 ENCOUNTER — PATIENT OUTREACH (OUTPATIENT)
Dept: CASE MANAGEMENT | Age: 65
End: 2020-04-14

## 2020-04-14 NOTE — ED NOTES
I have reviewed discharge instructions with the patient. The patient verbalized understanding. Patient left ED via Discharge Method: ambulatory to Home with family. Opportunity for questions and clarification provided. Patient given 3 scripts. To continue your aftercare when you leave the hospital, you may receive an automated call from our care team to check in on how you are doing. This is a free service and part of our promise to provide the best care and service to meet your aftercare needs.  If you have questions, or wish to unsubscribe from this service please call [phone]. Thank you for Choosing our [facility] Emergency Department.

## 2020-04-14 NOTE — ED PROVIDER NOTES
68-year-old female presenting for diffuse abdominal pain. She reports it is a cramping stabbing pain is from right to left and down into the pelvis. It started yesterday. It is been constant. She is had some nausea but no vomiting. She describes constipation but that seems to be a chronic issue for her. She has had no fevers or dysuria. The history is provided by the patient. Abdominal Pain    This is a new problem. The current episode started yesterday. The problem occurs constantly. The problem has not changed since onset. The pain is associated with an unknown factor. The pain is located in the generalized abdominal region. The quality of the pain is pressure-like, aching and cramping. The pain is at a severity of 6/10. The pain is severe. Associated symptoms include nausea and constipation. Pertinent negatives include no anorexia, no fever, no belching, no diarrhea, no flatus, no hematochezia, no melena, no vomiting, no dysuria, no frequency, no hematuria, no headaches, no arthralgias, no myalgias, no trauma, no chest pain and no back pain. Nothing worsens the pain. The pain is relieved by nothing. Past workup includes no CT scan, no ultrasound, no surgery, no esophagogastroduodenoscopy, no UGI, no colonoscopy and no barium enema. Her past medical history does not include PUD, gallstones, GERD, ulcerative colitis, Crohn's disease, irritable bowel syndrome, cancer, UTI, pancreatitis, ectopic pregnancy, ovarian cysts, diverticulitis, atrial fibrillation, DM, kidney stones or small bowel obstruction. Past Medical History:   Diagnosis Date    Angina     at rest in 3/2010 potassium low; denies currently    Arthritis     does not know kind-sees rheumatologist/ spine/ LLE/ L ft/toes; pt reports psoriatic    Bronchitis ? Aug 2013    no hospitalization    CAD (coronary artery disease) 2011    EF= 65 %  left anterior descending coronary stenosis is 30 %. 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    Chronic kidney disease     Chronic pain     spine-has congenital scoliosis-R knee    COPD     COPD [facility]chronic obstructive pulmonary disease) [facility] 12/13/13    no use of rescue inhaler x 3 wks; denies ABDELRAHMAN    Diabetes ([facility] [address]     type 2 insulin dep x 2 yrs;avg fasting BS-118-205;S/S hypo @ 80    GERD (gastroesophageal reflux disease)     controlled with protonix    History of colon cancer 3/15/2013    S/p partial colectomy    Hypertension     controlled with med    Insomnia     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 Sedrick Garcia Mixed hyperlipidemia 3/15/2013    intolerant to medications    Nicotine vapor product user     Psychiatric disorder     depression and anxiety no med    PUD (peptic ulcer disease)     gastric    Renal insufficiency 3/15/2013    Has seen Dr. Mora Davila in the past.    Scoliosis, congenital     Thyroid nodule 3/15/2013    Type II or unspecified type diabetes mellitus without mention of complication, not stated as uncontrolled 3/15/2013      Is adjusting insulin at this time.         Past Surgical History:   Procedure Laterality Date    ABDOMEN SURGERY PROC UNLISTED  1996    colon resection    HX BACK SURGERY  2005    spinal cord implant    HX BACK SURGERY  2005    spinal cord implant removal    HX BACK SURGERY  2005    nerve embedded in muscle-resection R wrist    HX CERVICAL FUSION  10/11    w/ discectomy and cage - Dr. Omayra Sanches    hernia repair    HX KNEE REPLACEMENT  5/11    partial - Dr. Tashia Tate, Jan 2018    HX LUMBAR FUSION  1974 or 67    spinal fusion lumbar    HX OOPHORECTOMY  1974    left partial    HX CHRISTIAN AND BSO  1995         Family History:   Problem Relation Age of Onset    Alcohol abuse Father     Alcohol abuse Mother     Lung Disease Sister     Heart Disease Sister     Lung Disease Sister     Heart Disease Brother     Diabetes Paternal Grandfather        Social History     Socioeconomic History    Marital status: LEGALLY      Spouse name: Not on file    Number of children: Not on file    Years of education: Not on file    Highest education level: Not on file   Occupational History    Not on file   Social Needs    Financial resource strain: Not on file    Food insecurity     Worry: Not on file     Inability: Not on file   [employer] needs     Medical: Not on file     Non-medical: Not on file   Tobacco Use    Smoking status: Former Smoker     Packs/day: 1.00     Years: 40.00     Pack years: 40.00     Types: Cigarettes    Smokeless tobacco: Former User     Quit date: 9/16/2018    Tobacco comment: quit   Substance and Sexual Activity    Alcohol use: No    Drug use: No     Types: Prescription    Sexual activity: Not on file   Lifestyle    Physical activity     Days per week: Not on file     Minutes per session: Not on file    Stress: Not on file   Relationships    Social connections     Talks on phone: Not on file     Gets together: Not on file     Attends Gnosticism service: Not on file     Active member of club or organization: Not on file     Attends meetings of clubs or organizations: Not on file     Relationship status: Not on file    Intimate partner violence     Fear of current or ex partner: Not on file     Emotionally abused: Not on file     Physically abused: Not on file     Forced sexual activity: Not on file   Other Topics Concern    Not on file   Social History Narrative    Not on file         ALLERGIES: Latex; Adhesive; Arthrotec 50 [diclofenac-misoprostol]; Chantix [varenicline]; Neurontin [gabapentin]; Nsaids (non-steroidal anti-inflammatory drug); Requip [ropinirole]; and Tylenol [acetaminophen]    Review of Systems   Constitutional: Negative for fever. Cardiovascular: Negative for chest pain.    Gastrointestinal: Positive for abdominal pain, constipation and nausea. Negative for anorexia, diarrhea, flatus, hematochezia, melena and vomiting. Genitourinary: Negative for dysuria, frequency and hematuria. Musculoskeletal: Negative for arthralgias, back pain and myalgias. Neurological: Negative for headaches. All other systems reviewed and are negative. Vitals:    04/13/20 1857   BP: 139/71   Pulse: 95   Resp: 20   Temp: 98.1 °F (36.7 °C)   SpO2: 92%   Weight: 64.4 kg (142 lb)   Height: 5' (1.524 m)            Physical Exam  Vitals signs and nursing note reviewed. Constitutional:       Appearance: She is well-developed. HENT:      Head: Normocephalic and atraumatic. Eyes:      Conjunctiva/sclera: Conjunctivae normal.      Pupils: Pupils are equal, round, and reactive to light. Neck:      Musculoskeletal: Neck supple. Cardiovascular:      Rate and Rhythm: Normal rate and regular rhythm. Pulmonary:      Effort: Pulmonary effort is normal.      Breath sounds: Normal breath sounds. Abdominal:      General: Bowel sounds are normal.      Palpations: Abdomen is soft. Tenderness: There is generalized abdominal tenderness. Musculoskeletal: Normal range of motion. Skin:     General: Skin is warm and dry. Neurological:      Mental Status: She is alert and oriented to person, place, and time. MDM  Number of Diagnoses or Management Options  [facility]):   Diagnosis management comments: 65-year-old female presenting for generalized abdominal pain.   Concern for cholecystitis, cholelithiasis, pancreatitis, colitis, diverticulitis, bowel obstruction, chronic constipation       Amount and/or Complexity of Data Reviewed  Clinical lab tests: ordered and reviewed (Results for orders placed or performed during the hospital encounter of 04/13/20  -CBC WITH AUTOMATED DIFF       Result                      Value             Ref Range           WBC                         16.6 (H)          4.3 - 11.1 K*       RBC                         5.34 (H) 4.05 - 5.2 M*       HGB                         15.0              11.7 - 15.4 *       HCT                         46.4 (H)          35.8 - 46.3 %       MCV                         86.9              79.6 - 97.8 *       MCH                         28.1              26.1 - 32.9 *       MCHC                        32.3              31.4 - 35.0 *       RDW                         14.5              11.9 - 14.6 %       PLATELET                    348               150 - 450 K/*       MPV                         8.0 (L)           9.4 - 12.3 FL       ABSOLUTE NRBC               0.00              0.0 - 0.2 K/*       NEUTROPHILS                 66                43 - 78 %           LYMPHOCYTES                 28                13 - 44 %           MONOCYTES                   5                 4.0 - 12.0 %        EOSINOPHILS                 1                 0.5 - 7.8 %         BASOPHILS                   0                 0.0 - 2.0 %         IMMATURE GRANULOCYTES       0                 0.0 - 5.0 %         ABS. NEUTROPHILS            11.0 (H)          1.7 - 8.2 K/*       ABS. LYMPHOCYTES            4.6               0.5 - 4.6 K/*       ABS. MONOCYTES              0.8               0.1 - 1.3 K/*       ABS. EOSINOPHILS            0.2               0.0 - 0.8 K/*       ABS. BASOPHILS              0.0               0.0 - 0.2 K/*       ABS. IMM.  GRANS.            0.0               0.0 - 0.5 K/*       RBC COMMENTS                                                  SLIGHT   ANISOCYTOSIS + POIKILOCYTOSIS          RBC COMMENTS                                                  OCCASIONAL   STOMATOCYTES          WBC COMMENTS                MODERATE                              PLATELET COMMENTS           ADEQUATE                              DF                          MANUAL                           -METABOLIC PANEL, COMPREHENSIVE       Result                      Value             Ref Range           Sodium                      134 (L) 136 - 145 mm*       Potassium                   2.9 (LL)          3.5 - 5.1 mm*       Chloride                    98                98 - 107 mmo*       CO2                         30                21 - 32 mmol*       Anion gap                   6 (L)             7 - 16 mmol/L       Glucose                     138 (H)           65 - 100 mg/*       BUN                         10                8 - 23 MG/DL        Creatinine                  0.78              0.6 - 1.0 MG*       GFR est AA                  >60               >60 ml/min/1*       GFR est non-AA              >60               >60 ml/min/1*       Calcium                     8.9               8.3 - 10.4 M*       Bilirubin, total            0.4               0.2 - 1.1 MG*       ALT (SGPT)                  15                12 - 65 U/L         AST (SGOT)                  7 (L)             15 - 37 U/L         Alk.  phosphatase            93                50 - 136 U/L        Protein, total              7.4               6.3 - 8.2 g/*       Albumin                     3.2               3.2 - 4.6 g/*       Globulin                    4.2 (H)           2.3 - 3.5 g/*       A-G Ratio                   0.8 (L)           1.2 - 3.5      -LIPASE       Result                      Value             Ref Range           Lipase                      19 (L)            73 - 393 U/L   -URINE MICROSCOPIC       Result                      Value             Ref Range           WBC                         3-5               0 /hpf              RBC                         0-3               0 /hpf              Epithelial cells            5-10              0 /hpf              Bacteria                    2+ (H)            0 /hpf              Casts                       0-3               0 /lpf              Crystals, urine             0                 0 /LPF              Mucus                       0                 0 /lpf              Other observations RESULTS VERIFIED MANUALLY  )  Tests in the radiology section of CPT®: ordered and reviewed (Xr Chest Pa Lat    Result Date: 4/13/2020  AP LATERAL CHEST  4/13/2020 7:52 PM HISTORY:  low 02 sats  ; diffuse cramping abdominal pain x2 days; epigastric pain; cough COMPARISON: July 11, 2014 FINDINGS: Anterior fixation hardware is present in the lower cervical spine. There is minimal atelectasis in the left lung base. There is no lobar consolidation, pleural effusions or pulmonary edema. IMPRESSION: No consolidation. Xr Chest Pa Lat    Result Date: 4/13/2020  AP LATERAL CHEST  4/13/2020 7:52 PM HISTORY:  low 02 sats  ; diffuse cramping abdominal pain x2 days; epigastric pain; cough COMPARISON: July 11, 2014 FINDINGS: Anterior fixation hardware is present in the lower cervical spine. There is minimal atelectasis in the left lung base. There is no lobar consolidation, pleural effusions or pulmonary edema. IMPRESSION: No consolidation. Ct Abd Pelv W Cont    Result Date: 4/13/2020  CT OF THE ABDOMEN AND PELVIS INDICATION: abd pain and elevated wbc count. COMPARISON: None. TECHNIQUE: Multiple axial images were obtained through the abdomen and pelvis after intravenous injection of 100 mL Isovue 370. Intravenous contrast was used for better evaluation of solid organs and vascular structures. Radiation dose reduction techniques were used for this study. Our CT scanners use one or all of the following: Automated exposure control, adjustment of the mA and/or kV according to patient size, iterative reconstruction. FINDINGS: Visualized thorax: Scarring versus atelectasis with volume loss in the lingula. Liver: Normal in size and morphology. No focal lesions. Gallbladder/biliary: The gallbladder is surgically absent. CBD and intrahepatic biliary dilation slightly greater than expected when allowing for the patient's age and postcholecystectomy status.  Pancreas: Normal. Spleen: Normal. Adrenals: Normal. Kidneys: No focal lesion or hydronephrosis. Bladder: Normal. Pelvic organs: The uterus is surgically absent. Gastrointestinal: Several small bowel loops in the left upper quadrant are dilated and fluid-filled with air-fluid levels present. Questionable transition point with associated small bowel feces sign on series 2, image 60. Peritoneum/retroperitoneum: Normal. Lymph nodes: Normal. Vessels: Aortoiliac atherosclerotic disease. Bones/Soft tissues: S-shaped scoliosis and multilevel degenerative changes of the spine. IMPRESSION: 1. Findings suggest early partial obstruction involving small bowel loops in the left upper quadrant. 2.  CBD and intrahepatic biliary dilation slightly greater than expected allowing for the patient's age and postcholecystectomy status. Correlate for any evidence of biliary obstruction and consider MRCP as indicated.     )  Tests in the medicine section of CPT®: ordered and reviewed  Independent visualization of images, tracings, or specimens: yes    Risk of Complications, Morbidity, and/or Mortality  Presenting problems: high  Diagnostic procedures: high  Management options: moderate  General comments: Reevaluated the patient she is feeling better. Shared decision making:  Discussed all of the findings with the patient. Given that she is not vomiting and is still passing gas her symptoms are most consistent with an ileus. I discussed this with the patient and she is understanding of this. I did offer hospitalization versus home with soft liquid diet for several days along with cramping medication, nausea medication and antibiotics. Patient states she would rather try to do this at home and will return to the emergency department should she develop vomiting, fevers, or stop passing gas or having any bowel movements. I personally reviewed the patient's vital signs, laboratory tests, and/or radiological findings.   I discussed these findings with the patient and their significance. I answered all questions and gave the patient clear return precautions. The patient was discharged from the emergency department in stable condition    I wore appropriate PPE throughout this patient's ED visit. Israel Marinelli MD, 9:42 PM          Patient Progress  Patient progress: improved    ED Course as of Apr 13 2141 Mon Apr 13, 2020 2131 Patient shows possible partial small bowel obstruction but the patient's passing gas bowel movement. She is not vomiting.     [JS]      ED Course User Index  [JS] Ignacia Parra MD       Procedures

## 2020-04-14 NOTE — PROGRESS NOTES
Patient contacted regarding recent discharge and COVID-19 risk. Ambulatory Care Manager contacted the patient by telephone to perform post discharge assessment. Verified name and  with patient as identifiers. Patient has following risk factors of:  COPD  DM       Pt reports still has pain and gas, instructed in soft liquid diet for several days along with cramping medication, nausea medication and antibiotics. Patient states she would rather try to do this at home and will return to the emergency department should she develop vomiting, fevers, or stop passing gas or having any bowel movements. ACM reviewed discharge instructions, medical action plan and red flags related to discharge diagnosis. Reviewed and educated them on any new and changed medications related to discharge diagnosis. Advised obtaining a 90-day supply of all daily and as-needed medications. Education provided regarding infection prevention, and signs and symptoms of COVID-19 and when to seek medical attention with patient who verbalized understanding. Discussed exposure protocols and quarantine from [address] you at higher risk for severe illness  and given an opportunity for questions and concerns. The patient agrees to contact the COVID-19 hotline [phone] or PCP office for questions related to their healthcare. AC provided contact information for future reference. From CDC: Are you at higher risk for severe illness?  Wash your hands often.  Avoid close contact (6 feet, which is about two arm lengths) with people who are sick.  Put distance between yourself and other people if COVID-19 is spreading in your community.  Clean and disinfect frequently touched surfaces.  Avoid all cruise travel and non-essential air travel.  Call your healthcare professional if you have concerns about COVID-19 and your underlying condition or if you are sick.     For more information on steps you can take to protect yourself, see CDC's How to Protect Yourself     Patient/family/caregiver given information for [employer] [facility] and agrees to enroll     Yes  Patient's preferred e-mail:   [email or web address]  Patient's preferred phone number: [phone]  Based on Loop alert triggers, patient will be contacted by nurse care manager for worsening symptoms. Plan for follow-up call in 7-14d based on severity of symptoms and risk factors.

## 2020-04-15 ENCOUNTER — PATIENT OUTREACH (OUTPATIENT)
Dept: CASE MANAGEMENT | Age: 65
End: 2020-04-15

## 2020-04-15 NOTE — PROGRESS NOTES
RNCM received call from pt stating she was continuing to have abd pain. Pt reports BM 4/15/20. Pt asking if she may take gas x and/or  double dicyclomine medication. RNCM noted pt called PCP office and advised to return to ED if not improving. Pt wishes not to return to ED, but states she will make arrangements to go if necessary.        RNCM left  w/  PCP office regarding pt request.

## 2020-04-16 ENCOUNTER — PATIENT OUTREACH (OUTPATIENT)
Dept: CASE MANAGEMENT | Age: 65
End: 2020-04-16

## 2020-04-16 NOTE — PROGRESS NOTES
RNCM spoke w/ pt who states she is feeling much better, \"little pain\" today and is actually \"hungry\", asking if she can eat grits or oatmeal.  No other complaints. RNCM then received call back from Marquez Hill who reports he has received multiple calls from pt asking if it is ok for her to eat steak. Per Sherry Torrez, he has answered all questions for pt today. RNCM will f/u w/ pt regarding COVID RODNEY in approx 2wks.

## 2020-06-11 ENCOUNTER — HOSPITAL ENCOUNTER (OUTPATIENT)
Dept: LAB | Age: 65
Discharge: HOME | End: 2020-06-11

## 2020-06-11 PROCEDURE — 88305 TISSUE EXAM BY PATHOLOGIST: CPT

## 2020-09-02 ENCOUNTER — HOSPITAL ENCOUNTER (OUTPATIENT)
Dept: GENERAL RADIOLOGY | Age: 65
Discharge: HOME | End: 2020-09-02
Payer: MEDICARE

## 2020-09-02 DIAGNOSIS — M25.552 BILATERAL HIP PAIN: ICD-10-CM

## 2020-09-02 DIAGNOSIS — M25.551 BILATERAL HIP PAIN: ICD-10-CM

## 2020-09-02 PROCEDURE — 73502 X-RAY EXAM HIP UNI 2-3 VIEWS: CPT

## 2021-04-05 PROBLEM — I45.10 RBBB: Status: ACTIVE | Noted: 2021-04-05

## 2021-04-05 PROBLEM — I25.10 CORONARY ARTERY CALCIFICATION: Status: ACTIVE | Noted: 2021-04-05

## 2021-04-05 PROBLEM — I25.84 CORONARY ARTERY CALCIFICATION: Status: ACTIVE | Noted: 2021-04-05

## 2021-04-05 PROBLEM — R01.1 MURMUR, CARDIAC: Status: ACTIVE | Noted: 2021-04-05

## 2021-04-05 PROBLEM — Z72.0 TOBACCO ABUSE: Status: ACTIVE | Noted: 2021-04-05

## 2021-04-07 PROBLEM — M19.90 OSTEOARTHRITIS: Status: ACTIVE | Noted: 2020-01-01

## 2021-07-27 ENCOUNTER — HOSPITAL ENCOUNTER (OUTPATIENT)
Dept: MRI IMAGING | Age: 66
Discharge: HOME | End: 2021-07-27
Attending: FAMILY MEDICINE
Payer: MEDICARE

## 2021-07-27 DIAGNOSIS — M41.34 THORACOGENIC SCOLIOSIS OF THORACIC REGION: ICD-10-CM

## 2021-07-27 DIAGNOSIS — M54.6 THORACIC SPINE PAIN: ICD-10-CM

## 2021-07-27 PROCEDURE — 72146 MRI CHEST SPINE W/O DYE: CPT

## 2021-07-28 NOTE — PROGRESS NOTES
The Radiologist read your MRI. He said you have scoliosis in the thoracic spine and mild degenerative disc changes without significant spinal stenosis or cord compression. I not have a reason for your pain. Make sure you follow-up with your pain doctor.

## 2021-10-01 ENCOUNTER — HOSPITAL ENCOUNTER (INPATIENT)
Age: 66
LOS: 1 days | Discharge: HOME HEALTH | DRG: 896 | End: 2021-10-04
Attending: STUDENT IN AN ORGANIZED HEALTH CARE EDUCATION/TRAINING PROGRAM | Admitting: HOSPITALIST
Payer: MEDICARE

## 2021-10-01 ENCOUNTER — APPOINTMENT (OUTPATIENT)
Dept: CT IMAGING | Age: 66
DRG: 896 | End: 2021-10-01
Attending: STUDENT IN AN ORGANIZED HEALTH CARE EDUCATION/TRAINING PROGRAM
Payer: MEDICARE

## 2021-10-01 ENCOUNTER — APPOINTMENT (OUTPATIENT)
Dept: GENERAL RADIOLOGY | Age: 66
DRG: 896 | End: 2021-10-01
Attending: STUDENT IN AN ORGANIZED HEALTH CARE EDUCATION/TRAINING PROGRAM
Payer: MEDICARE

## 2021-10-01 ENCOUNTER — APPOINTMENT (OUTPATIENT)
Dept: GENERAL RADIOLOGY | Age: 66
DRG: 896 | End: 2021-10-01
Attending: EMERGENCY MEDICINE
Payer: MEDICARE

## 2021-10-01 DIAGNOSIS — R41.82 ALTERED MENTAL STATUS, UNSPECIFIED ALTERED MENTAL STATUS TYPE: Primary | ICD-10-CM

## 2021-10-01 DIAGNOSIS — R53.81 MALAISE: ICD-10-CM

## 2021-10-01 PROBLEM — G93.40 ACUTE ENCEPHALOPATHY: Status: ACTIVE | Noted: 2021-10-01

## 2021-10-01 LAB
ALBUMIN SERPL-MCNC: 3.6 G/DL (ref 3.2–4.6)
ALBUMIN/GLOB SERPL: 0.9 {RATIO} (ref 1.2–3.5)
ALP SERPL-CCNC: 93 U/L (ref 50–136)
ALT SERPL-CCNC: 50 U/L (ref 12–65)
AMMONIA PLAS-SCNC: 17 UMOL/L (ref 11–32)
AMPHET UR QL SCN: NEGATIVE
ANION GAP SERPL CALC-SCNC: 5 MMOL/L (ref 7–16)
AST SERPL-CCNC: 26 U/L (ref 15–37)
BACTERIA URNS QL MICRO: 0 /HPF
BARBITURATES UR QL SCN: NEGATIVE
BASOPHILS # BLD: 0.2 K/UL (ref 0–0.2)
BASOPHILS NFR BLD: 1 % (ref 0–2)
BENZODIAZ UR QL: POSITIVE
BILIRUB SERPL-MCNC: 0.6 MG/DL (ref 0.2–1.1)
BUN SERPL-MCNC: 10 MG/DL (ref 8–23)
CALCIUM SERPL-MCNC: 9.5 MG/DL (ref 8.3–10.4)
CANNABINOIDS UR QL SCN: POSITIVE
CASTS URNS QL MICRO: NORMAL /LPF
CHLORIDE SERPL-SCNC: 104 MMOL/L (ref 98–107)
CO2 SERPL-SCNC: 30 MMOL/L (ref 21–32)
COCAINE UR QL SCN: NEGATIVE
CREAT SERPL-MCNC: 0.57 MG/DL (ref 0.6–1)
DIFFERENTIAL METHOD BLD: ABNORMAL
EOSINOPHIL # BLD: 0.4 K/UL (ref 0–0.8)
EOSINOPHIL NFR BLD: 2 % (ref 0.5–7.8)
EPI CELLS #/AREA URNS HPF: NORMAL /HPF
ERYTHROCYTE [DISTWIDTH] IN BLOOD BY AUTOMATED COUNT: 15.5 % (ref 11.9–14.6)
GLOBULIN SER CALC-MCNC: 3.9 G/DL (ref 2.3–3.5)
GLUCOSE BLD STRIP.AUTO-MCNC: 68 MG/DL (ref 65–100)
GLUCOSE BLD STRIP.AUTO-MCNC: 77 MG/DL (ref 65–100)
GLUCOSE SERPL-MCNC: 68 MG/DL (ref 65–100)
HCT VFR BLD AUTO: 48.5 % (ref 35.8–46.3)
HGB BLD-MCNC: 15.6 G/DL (ref 11.7–15.4)
IMM GRANULOCYTES # BLD AUTO: 0 K/UL (ref 0–0.5)
IMM GRANULOCYTES NFR BLD AUTO: 0 % (ref 0–5)
LACTATE SERPL-SCNC: 0.5 MMOL/L (ref 0.4–2)
LYMPHOCYTES # BLD: 7.1 K/UL (ref 0.5–4.6)
LYMPHOCYTES NFR BLD: 39 % (ref 13–44)
MCH RBC QN AUTO: 28 PG (ref 26.1–32.9)
MCHC RBC AUTO-ENTMCNC: 32.2 G/DL (ref 31.4–35)
MCV RBC AUTO: 86.9 FL (ref 79.6–97.8)
METHADONE UR QL: NEGATIVE
MONOCYTES # BLD: 1.1 K/UL (ref 0.1–1.3)
MONOCYTES NFR BLD: 6 % (ref 4–12)
NEUTS SEG # BLD: 9.3 K/UL (ref 1.7–8.2)
NEUTS SEG NFR BLD: 52 % (ref 43–78)
NRBC # BLD: 0 K/UL (ref 0–0.2)
OPIATES UR QL: POSITIVE
PCP UR QL: NEGATIVE
PLATELET # BLD AUTO: 343 K/UL (ref 150–450)
PLATELET COMMENTS,PCOM: ADEQUATE
PMV BLD AUTO: 8.3 FL (ref 9.4–12.3)
POTASSIUM SERPL-SCNC: 3.8 MMOL/L (ref 3.5–5.1)
PROCALCITONIN SERPL-MCNC: <0.05 NG/ML
PROT SERPL-MCNC: 7.5 G/DL (ref 6.3–8.2)
RBC # BLD AUTO: 5.58 M/UL (ref 4.05–5.2)
RBC #/AREA URNS HPF: NORMAL /HPF
RBC MORPH BLD: ABNORMAL
SERVICE CMNT-IMP: NORMAL
SERVICE CMNT-IMP: NORMAL
SODIUM SERPL-SCNC: 139 MMOL/L (ref 136–145)
TSH SERPL DL<=0.005 MIU/L-ACNC: 2.29 UIU/ML (ref 0.36–3.74)
WBC # BLD AUTO: 18.1 K/UL (ref 4.3–11.1)
WBC MORPH BLD: ABNORMAL
WBC URNS QL MICRO: NORMAL /HPF

## 2021-10-01 PROCEDURE — 96360 HYDRATION IV INFUSION INIT: CPT

## 2021-10-01 PROCEDURE — 99285 EMERGENCY DEPT VISIT HI MDM: CPT

## 2021-10-01 PROCEDURE — 71046 X-RAY EXAM CHEST 2 VIEWS: CPT

## 2021-10-01 PROCEDURE — 82962 GLUCOSE BLOOD TEST: CPT

## 2021-10-01 PROCEDURE — 80307 DRUG TEST PRSMV CHEM ANLYZR: CPT

## 2021-10-01 PROCEDURE — 82140 ASSAY OF AMMONIA: CPT

## 2021-10-01 PROCEDURE — 84145 PROCALCITONIN (PCT): CPT

## 2021-10-01 PROCEDURE — 81015 MICROSCOPIC EXAM OF URINE: CPT

## 2021-10-01 PROCEDURE — 2709999900 HC NON-CHARGEABLE SUPPLY

## 2021-10-01 PROCEDURE — 74011250636 HC RX REV CODE- 250/636: Performed by: HOSPITALIST

## 2021-10-01 PROCEDURE — 93005 ELECTROCARDIOGRAM TRACING: CPT | Performed by: STUDENT IN AN ORGANIZED HEALTH CARE EDUCATION/TRAINING PROGRAM

## 2021-10-01 PROCEDURE — 99218 HC RM OBSERVATION: CPT

## 2021-10-01 PROCEDURE — 81003 URINALYSIS AUTO W/O SCOPE: CPT

## 2021-10-01 PROCEDURE — 80053 COMPREHEN METABOLIC PANEL: CPT

## 2021-10-01 PROCEDURE — 74011250636 HC RX REV CODE- 250/636: Performed by: STUDENT IN AN ORGANIZED HEALTH CARE EDUCATION/TRAINING PROGRAM

## 2021-10-01 PROCEDURE — 70450 CT HEAD/BRAIN W/O DYE: CPT

## 2021-10-01 PROCEDURE — 85025 COMPLETE CBC W/AUTO DIFF WBC: CPT

## 2021-10-01 PROCEDURE — 83605 ASSAY OF LACTIC ACID: CPT

## 2021-10-01 PROCEDURE — 84443 ASSAY THYROID STIM HORMONE: CPT

## 2021-10-01 PROCEDURE — 96361 HYDRATE IV INFUSION ADD-ON: CPT

## 2021-10-01 PROCEDURE — 74011250637 HC RX REV CODE- 250/637: Performed by: HOSPITALIST

## 2021-10-01 RX ORDER — SODIUM CHLORIDE 0.9 % (FLUSH) 0.9 %
5-40 SYRINGE (ML) INJECTION AS NEEDED
Status: DISCONTINUED | OUTPATIENT
Start: 2021-10-01 | End: 2021-10-04 | Stop reason: HOSPADM

## 2021-10-01 RX ORDER — INSULIN LISPRO 100 [IU]/ML
INJECTION, SOLUTION INTRAVENOUS; SUBCUTANEOUS
Status: DISCONTINUED | OUTPATIENT
Start: 2021-10-01 | End: 2021-10-04 | Stop reason: HOSPADM

## 2021-10-01 RX ORDER — HYDRALAZINE HYDROCHLORIDE 20 MG/ML
10 INJECTION INTRAMUSCULAR; INTRAVENOUS
Status: DISCONTINUED | OUTPATIENT
Start: 2021-10-01 | End: 2021-10-03

## 2021-10-01 RX ORDER — CLONIDINE HYDROCHLORIDE 0.2 MG/1
0.2 TABLET ORAL
Status: DISCONTINUED | OUTPATIENT
Start: 2021-10-01 | End: 2021-10-03

## 2021-10-01 RX ORDER — ACETAMINOPHEN 650 MG/1
650 SUPPOSITORY RECTAL
Status: DISCONTINUED | OUTPATIENT
Start: 2021-10-01 | End: 2021-10-04 | Stop reason: HOSPADM

## 2021-10-01 RX ORDER — INSULIN LISPRO 100 [IU]/ML
5 INJECTION, SOLUTION INTRAVENOUS; SUBCUTANEOUS
Status: DISCONTINUED | OUTPATIENT
Start: 2021-10-01 | End: 2021-10-04 | Stop reason: HOSPADM

## 2021-10-01 RX ORDER — AMLODIPINE BESYLATE 10 MG/1
10 TABLET ORAL DAILY
Status: DISCONTINUED | OUTPATIENT
Start: 2021-10-01 | End: 2021-10-04 | Stop reason: HOSPADM

## 2021-10-01 RX ORDER — SODIUM CHLORIDE 0.9 % (FLUSH) 0.9 %
5-40 SYRINGE (ML) INJECTION EVERY 8 HOURS
Status: DISCONTINUED | OUTPATIENT
Start: 2021-10-01 | End: 2021-10-04 | Stop reason: HOSPADM

## 2021-10-01 RX ORDER — ASPIRIN 81 MG/1
81 TABLET ORAL EVERY 12 HOURS
Status: DISCONTINUED | OUTPATIENT
Start: 2021-10-01 | End: 2021-10-04 | Stop reason: HOSPADM

## 2021-10-01 RX ORDER — ALPRAZOLAM 0.5 MG/1
0.5 TABLET ORAL
Status: DISCONTINUED | OUTPATIENT
Start: 2021-10-01 | End: 2021-10-04 | Stop reason: HOSPADM

## 2021-10-01 RX ORDER — ACETAMINOPHEN 325 MG/1
650 TABLET ORAL
Status: DISCONTINUED | OUTPATIENT
Start: 2021-10-01 | End: 2021-10-04 | Stop reason: HOSPADM

## 2021-10-01 RX ORDER — SENNOSIDES 8.6 MG/1
2 TABLET ORAL
Status: DISCONTINUED | OUTPATIENT
Start: 2021-10-01 | End: 2021-10-03

## 2021-10-01 RX ORDER — SODIUM CHLORIDE 9 MG/ML
100 INJECTION, SOLUTION INTRAVENOUS CONTINUOUS
Status: DISPENSED | OUTPATIENT
Start: 2021-10-01 | End: 2021-10-02

## 2021-10-01 RX ORDER — ONDANSETRON 2 MG/ML
4 INJECTION INTRAMUSCULAR; INTRAVENOUS
Status: DISCONTINUED | OUTPATIENT
Start: 2021-10-01 | End: 2021-10-04 | Stop reason: HOSPADM

## 2021-10-01 RX ORDER — ALPRAZOLAM 0.5 MG/1
0.5 TABLET ORAL 2 TIMES DAILY
Status: DISCONTINUED | OUTPATIENT
Start: 2021-10-01 | End: 2021-10-04 | Stop reason: HOSPADM

## 2021-10-01 RX ADMIN — ASPIRIN 81 MG: 81 TABLET ORAL at 22:33

## 2021-10-01 RX ADMIN — SODIUM CHLORIDE 1000 ML: 900 INJECTION, SOLUTION INTRAVENOUS at 17:35

## 2021-10-01 RX ADMIN — ALPRAZOLAM 0.5 MG: 0.5 TABLET ORAL at 22:33

## 2021-10-01 RX ADMIN — SODIUM CHLORIDE 100 ML/HR: 900 INJECTION, SOLUTION INTRAVENOUS at 22:34

## 2021-10-01 RX ADMIN — AMLODIPINE BESYLATE 10 MG: 10 TABLET ORAL at 22:33

## 2021-10-01 RX ADMIN — Medication 10 ML: at 22:36

## 2021-10-01 NOTE — H&P
INTERNAL MEDICINE H&P/CONSULT    Subjective:     39-year-old female w/ hx of bipolar dz, HTN, DM and colon cancer, presents with son at bedside. Patient discharged from [facility] yesterday where she was admitted for altered mental status and dehydration. Son reports upon returning home, her family physician called this morning to talk with her. Family physician was concerned for continued confusion and advised that she come to different hospital to be reevaluated. Patient been diabetic for many years, today she was confused how to check her blood sugar and how to interpret the level. This is abnormal for patient. Son also reports patient has been intermittently compliant with some of her medications. States he does not believe she has taken her Xanax in approximately 1 week. Reports she normally takes 1 mg 3 times daily. States she also possibly has a history of mental health disorder, bipolar. Does not see a psychiatrist.  Denies fever or chills. Denies chest pain or shortness of breath. Son reports patient has intermittent episodes of lucidity followed by confusion and rambling. States she will \"the Bible and scripture and then asked if dead relatives are still around. States this is not patient's baseline and has rapid onset. On further questioning, pt has been on benzo chronically on daily basis and PCP has stopped it last week. Since then, pt has been confused as above. During my interview, she is lucid and answer questions. Past Medical History:   Diagnosis Date    Angina     at rest in 3/2010 potassium low; denies currently    Arthritis     does not know kind-sees rheumatologist/ spine/ LLE/ L ft/toes; pt reports psoriatic    Bronchitis ? Aug 2013    no hospitalization    CAD (coronary artery disease) 2011    EF= 65 %  left anterior descending coronary stenosis is 30 %. 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    Chronic kidney disease     Chronic pain     spine-has congenital scoliosis-R knee    COPD     COPD (chronic obstructive pulmonary disease) ([facility] 12/13/13    no use of rescue inhaler x 3 wks; denies ABDELRAHMAN    Diabetes ([facility]     type 2 insulin dep x 2 yrs;avg fasting BS-118-205;S/S hypo @ 80    GERD (gastroesophageal reflux disease)     controlled with protonix    History of colon cancer 3/15/2013    S/p partial colectomy    Hypertension     controlled with med    Insomnia     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 Leanne Nogueira Mixed hyperlipidemia 3/15/2013    intolerant to medications    Nicotine vapor product user     Psychiatric disorder     depression and anxiety no med    PUD (peptic ulcer disease)     gastric    Renal insufficiency 3/15/2013    Has seen Dr. Phong Guerrero in the past.    Scoliosis, congenital     Thyroid nodule 3/15/2013    Type II or unspecified type diabetes mellitus without mention of complication, not stated as uncontrolled 3/15/2013      Is adjusting insulin at this time. Past Surgical History:   Procedure Laterality Date    HX BACK SURGERY  2005    spinal cord implant    HX BACK SURGERY  2005    spinal cord implant removal    HX BACK SURGERY  2005    nerve embedded in muscle-resection R wrist    HX CERVICAL FUSION  10/11    w/ discectomy and cage - Dr. Tiwari Adjutant    hernia repair    HX KNEE REPLACEMENT  5/11    partial - Dr. Marcela Schroeder, Jan 2018    [address] or 67    spinal fusion lumbar    HX OOPHORECTOMY  1974    left partial    HX CHRISTIAN AND BSO  1995    TX ABDOMEN SURGERY [address]    colon resection      Prior to Admission medications    Medication Sig Start Date End Date Taking?  Authorizing Provider   ALPRAZolam Phil aPrdo) 1 mg tablet TAKE ONE TABLET BY MOUTH THREE TIMES A DAY AS NEEDED FOR ANXIETY 8/23/21   Raciel Kirk Whitley MD   cyclobenzaprine (FLEXERIL) 10 mg tablet TAKE ONE TABLET BY MOUTH THREE TIMES A DAY AS NEEDED FOR MUSCLE SPASMS 8/18/21   Kirk Schrader MD   albuterol (PROVENTIL HFA, VENTOLIN HFA, PROAIR HFA) 90 mcg/actuation inhaler INHALE TWO PUFFS BY MOUTH EVERY 6 HOURS AS NEEDED FOR WHEEZING 8/13/21   Kirk Guadarrama MD   rosuvastatin (CRESTOR) 10 mg tablet TAKE ONE TABLET BY MOUTH NIGHTLY 8/5/21   Conchis MCKEON MD   morphine IR (MS IR) 30 mg tablet Take 30 mg by mouth every twelve (12) hours every twelve (12) hours. Provider, Historical   morphine IR (MS IR) 15 mg tablet Take 15 mg by mouth every six (6) hours as needed for Pain. Provider, Historical   mupirocin calcium (BACTROBAN) 2 % nasal ointment by Both Nostrils route two (2) times a day. 7/15/21   Roz Azevedo MD   traZODone (DESYREL) 100 mg tablet TAKE 2-3 TABLETS BY MOUTH AT BEDTIME 7/4/21   Doug Found Kirk Whitley MD   metoprolol succinate (TOPROL-XL) 50 mg XL tablet TAKE 1 TABLET BY MOUTH EVERY DAY 5/4/21   Krik Guadarrama MD   olmesartan (BENICAR) 40 mg tablet 1 p.o. daily for blood pressure instead of olmesartan HCTZ 5/4/21   Roz Azeevdo MD   insulin degludec Paulette Rodriguez FlexTouch U-200) 200 unit/mL (3 mL) inpn pen 50 units sq daily ( 18 pens for a 90 day supply)  Indications: type 2 diabetes mellitus 5/4/21   Roz Azevedo MD   promethazine (PHENERGAN) 25 mg tablet Take 1 Tab by mouth every six (6) hours as needed for Nausea. 5/4/21   Roz Azevedo MD   hydroCHLOROthiazide (HYDRODIURIL) 12.5 mg tablet TAKE 1 TABLET BY MOUTH EVERY DAY FOR BLOOD PRESSURE WITH THE OLMESARTAN 5/4/21   Kirk Schrader MD   levocetirizine (XYZAL) 5 mg tablet Take 1 Tab by mouth nightly. 5/4/21   Roz Azevedo MD   atomoxetine (Strattera) 60 mg capsule 1 po qam instead of the  25 mg dose.  5/4/21   Doug Found Kirk Whitley MD   Walker (Ultra-Light Rollator) misc Used daily for ambulation - Dx: gait instability, peripheral neuropathy, DJD knee 5/4/21   Roz Azevedo MD   Narcan 4 mg/actuation nasal spray  2/24/21   Provider, Historical   mupirocin (BACTROBAN) 2 % ointment by Both Nostrils route two (2) times a day. 12/11/20   Josie Carbone MD   amLODIPine (NORVASC) 5 mg tablet Take 1 Tab by mouth daily. ( instead of tribenzor with benicar hct) 11/24/20   Lala Schrader MD   pantoprazole (PROTONIX) 40 mg tablet Take 1 Tab by mouth daily. For stomach - instead of prilosec( failed priolosec and nexium and prevacid) 11/24/20   Lala Schrader MD   montelukast (Singulair) 10 mg tablet Take 1 Tab by mouth daily. For allergies and asthma 11/24/20   Josie Carbone MD   naloxegoL (MOVANTIK) 12.5 mg tab tablet Take  by mouth Daily (before breakfast). Provider, Historical   vitamin D3-folic acid 9,055 unit- 1 mg tab Take  by mouth. Provider, Historical   aspirin delayed-release 81 mg tablet Take 1 Tab by mouth every twelve (12) hours every twelve (12) hours. 1/25/19   Feliberto Liu PA     Allergies   Allergen Reactions    Latex Rash     Skin raw    Adhesive Other (comments)     Bruising, burning    Arthrotec 50 [Diclofenac-Misoprostol] Nausea Only    Chantix [Varenicline] Other (comments)     \"I had violent outbursts\"    Gabapentin Palpitations     Other reaction(s): Other- (not listed) - Allergy  Upset stomach     Nsaids (Non-Steroidal Anti-Inflammatory Drug) Other (comments)     Not to take due to kidneys blood and pus in urine. Other reaction(s):  Other- (not listed) - Allergy  NA    Requip [Ropinirole] Other (comments)     Keep pt awake    Tylenol [Acetaminophen] Other (comments)     Bothers her liver or kidneys      Social History     Tobacco Use    Smoking status: Current Every Day Smoker     Packs/day: 1.00     Years: 40.00     Pack years: 40.00     Types: Cigarettes    Smokeless tobacco: Former User     Quit date: 9/16/2018    Tobacco comment: quit   Substance Use Topics    Alcohol use: No        Family History:  HTN    Review of Systems   A comprehensive review of systems was negative except for that written in the HPI. Objective: Intake / Output:  No intake/output data recorded. No intake/output data recorded. Physical Exam:  Visit Vitals  BP (!) 180/73   Pulse 68   Temp 97.7 °F (36.5 °C)   Resp 16   Ht 5' (1.524 m)   Wt 62.1 kg (137 lb)   SpO2 94%   BMI 26.76 kg/m²     General appearance: awake, alert, cooperative, mild distress, appears stated age. Dry mucous membranes, Disoriented. Head: Normocephalic, without obvious abnormality, atraumatic  Back: symmetric, no curvature. ROM normal. No CVA tenderness. Lungs: clear to auscultation bilaterally - Diminished bs bibasilar  Heart: regular rate and rhythm, S1, S2 normal, no murmur, click, rub or gallop. Abdomen: soft, no tenderness, no distension, normal bowel sound, no masses, no organomegaly  Extremities: atraumatic, no cyanosis - Bilateral lower limbs edema none. Skin: No rashes or ulceration. Neurologic: Grossly intact - perrla, moves 4 limbs, Babinski neg    ECG: sinus rhythm     Data Review (Labs):   Recent Results (from the past 24 hour(s))   CBC WITH AUTOMATED DIFF    Collection Time: 10/01/21  4:27 PM   Result Value Ref Range    WBC 18.1 (H) 4.3 - 11.1 K/uL    RBC 5.58 (H) 4.05 - 5.2 M/uL    HGB 15.6 (H) 11.7 - 15.4 g/dL    HCT 48.5 (H) 35.8 - 46.3 %    MCV 86.9 79.6 - 97.8 FL    MCH 28.0 26.1 - 32.9 PG    MCHC 32.2 31.4 - 35.0 g/dL    RDW 15.5 (H) 11.9 - 14.6 %    PLATELET 389 720 - 153 K/uL    MPV 8.3 (L) 9.4 - 12.3 FL    ABSOLUTE NRBC 0.00 0.0 - 0.2 K/uL    NEUTROPHILS 52 43 - 78 %    LYMPHOCYTES 39 13 - 44 %    MONOCYTES 6 4.0 - 12.0 %    EOSINOPHILS 2 0.5 - 7.8 %    BASOPHILS 1 0.0 - 2.0 %    IMMATURE GRANULOCYTES 0 0.0 - 5.0 %    ABS. NEUTROPHILS 9.3 (H) 1.7 - 8.2 K/UL    ABS. LYMPHOCYTES 7.1 (H) 0.5 - 4.6 K/UL    ABS. MONOCYTES 1.1 0.1 - 1.3 K/UL    ABS. EOSINOPHILS 0.4 0.0 - 0.8 K/UL    ABS. BASOPHILS 0.2 0.0 - 0.2 K/UL    ABS. IMM.  GRANS. 0.0 0.0 - 0.5 K/UL    RBC COMMENTS SLIGHT  ANISOCYTOSIS + POIKILOCYTOSIS        WBC COMMENTS OCCASIONAL      PLATELET COMMENTS ADEQUATE      DF AUTOMATED     METABOLIC PANEL, COMPREHENSIVE    Collection Time: 10/01/21  4:27 PM   Result Value Ref Range    Sodium 139 136 - 145 mmol/L    Potassium 3.8 3.5 - 5.1 mmol/L    Chloride 104 98 - 107 mmol/L    CO2 30 21 - 32 mmol/L    Anion gap 5 (L) 7 - 16 mmol/L    Glucose 68 65 - 100 mg/dL    BUN 10 8 - 23 MG/DL    Creatinine 0.57 (L) 0.6 - 1.0 MG/DL    GFR est AA >60 >60 ml/min/1.73m2    GFR est non-AA >60 >60 ml/min/1.73m2    Calcium 9.5 8.3 - 10.4 MG/DL    Bilirubin, total 0.6 0.2 - 1.1 MG/DL    ALT (SGPT) 50 12 - 65 U/L    AST (SGOT) 26 15 - 37 U/L    Alk. phosphatase 93 50 - 136 U/L    Protein, total 7.5 6.3 - 8.2 g/dL    Albumin 3.6 3.2 - 4.6 g/dL    Globulin 3.9 (H) 2.3 - 3.5 g/dL    A-G Ratio 0.9 (L) 1.2 - 3.5     TSH 3RD GENERATION    Collection Time: 10/01/21  4:27 PM   Result Value Ref Range    TSH 2.290 0.358 - 3.740 uIU/mL   LACTIC ACID    Collection Time: 10/01/21  4:27 PM   Result Value Ref Range    Lactic acid 0.5 0.4 - 2.0 MMOL/L   PROCALCITONIN    Collection Time: 10/01/21  4:27 PM   Result Value Ref Range    Procalcitonin <0.05 ng/mL   DRUG SCREEN, URINE    Collection Time: 10/01/21  5:14 PM   Result Value Ref Range    PCP(PHENCYCLIDINE) Negative      BENZODIAZEPINES Positive      COCAINE Negative      AMPHETAMINES Negative      METHADONE Negative      THC (TH-CANNABINOL) Positive      OPIATES Positive      BARBITURATES Negative     URINE MICROSCOPIC    Collection Time: 10/01/21  5:14 PM   Result Value Ref Range    WBC 3-5 0 /hpf    RBC 3-5 0 /hpf    Epithelial cells 5-10 0 /hpf    Bacteria 0 0 /hpf    Casts 3-5 0 /lpf   AMMONIA    Collection Time: 10/01/21  5:32 PM   Result Value Ref Range    Ammonia 17 11 - 32 UMOL/L       Assessment:     1- Acute encephalopathy, due to benzo withdrawal. Pt is on chronic narcotics and benzodiazepines, hx of bipolar dz.   2- Dehydration  3- HTN, not well controlled  4- DM, controlled. Plan:     Observation  Hold all meds affecting mental status, except low dose of benzo bid and prn  Consider psychiatric referral on DC  IVF hydration  Blood pressure control  Insulin scale  AM lab as needed  Continue essential home medications. Patient is full code. Further management depends on patient progress. Thank you for the oppourtinity to contribute in the care of your patient.     Signed By: Leopoldo Bis, MD     October 1, 2021

## 2021-10-01 NOTE — ED PROVIDER NOTES
79-year-old female presents to the emergency department with son at bedside. Patient recently discharged from [facility] yesterday where she was admitted for altered mental status and dehydration. Son reports upon returning home, her family physician called this morning to talk with her. Family physician was concerned for continued confusion and advised that she come to different hospital to be reevaluated. Patient been diabetic for many years, today she was confused how to check her blood sugar and how to interpret the level. This is abnormal for patient. Son also reports patient has been intermittently compliant with some of her medications. States he does not believe she has taken her Xanax in approximately 1 week. Reports she normally takes 1 mg 3 times daily. States she also possibly has a history of mental health disorder, bipolar. Does not see a psychiatrist.  Denies fever or chills. Denies chest pain or shortness of breath. Son reports patient has intermittent episodes of lucidity followed by confusion and rambling. States she will \"the Bible and scripture and then asked if dead relatives are still around. States this is not patient's baseline and has rapid onset. Past Medical History:   Diagnosis Date    Angina     at rest in 3/2010 potassium low; denies currently    Arthritis     does not know kind-sees rheumatologist/ spine/ LLE/ L ft/toes; pt reports psoriatic    Bronchitis ? Aug 2013    no hospitalization    CAD (coronary artery disease) 2011    EF= 65 %  left anterior descending coronary stenosis is 30 %. 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 Sydnie Lyons Mixed hyperlipidemia 3/15/2013    intolerant to medications    Nicotine vapor product user     Psychiatric disorder     depression and anxiety no med    PUD (peptic ulcer disease)     gastric    Renal insufficiency 3/15/2013    Has seen Dr. Julian Hernandez in the past.    Scoliosis, congenital     Thyroid nodule 3/15/2013    Type II or unspecified type diabetes mellitus without mention of complication, not stated as uncontrolled 3/15/2013      Is adjusting insulin at this time.         Past Surgical History:   Procedure Laterality Date    HX BACK SURGERY  2005    spinal cord implant    HX BACK SURGERY  2005    spinal cord implant removal    HX BACK SURGERY  2005    nerve embedded in muscle-resection R wrist    HX CERVICAL FUSION  10/11    w/ discectomy and cage - Dr. Kayce Humphrey    hernia repair    HX KNEE REPLACEMENT  5/11    partial - Dr. Bryan Hurst, Jan 2018    HX LUMBAR FUSION  1974 or 67    spinal fusion lumbar    HX OOPHORECTOMY  1974    left partial    HX CHRISTIAN AND BSO  1995    DC ABDOMEN SURGERY PROC UNLISTED  1996    colon resection         Family History:   Problem Relation Age of Onset    Alcohol abuse Father     Alcohol abuse Mother     Lung Disease Sister     Heart Disease Sister     Lung Disease Sister     Heart Disease Brother     Diabetes Paternal Grandfather        Social History     Socioeconomic History    Marital status: OTHER     Spouse name: Not on file    Number of children: Not on file    Years of education: Not on file    Highest education level: Not on file   Occupational History    Not on file   Tobacco Use    Smoking status: Current Every Day Smoker     Packs/day: 1.00     Years: 40.00     Pack years: 40.00     Types: Cigarettes    Smokeless tobacco: Former User     Quit date: 9/16/2018    Tobacco comment: quit   Vaping Use    Vaping Use: Never used   Substance and Sexual Activity    Alcohol use: No    Drug use: No     Types: Prescription    Sexual activity: Not on file   Other Topics Concern    Not on file   Social History Narrative    Not on file     Social Determinants of Health     Financial Resource Strain:     Difficulty of Paying Living Expenses:    Food Insecurity:     Worried About Running Out of Food in the Last Year:     [address] in the Last Year:    Transportation Needs:     Lack of Transportation (Medical):  Lack of Transportation (Non-Medical):    Physical Activity:     Days of Exercise per Week:     Minutes of Exercise per Session:    Stress:     Feeling of Stress :    Social Connections:     Frequency of Communication with Friends and Family:     Frequency of Social Gatherings with Friends and Family:     Attends [employer] Services:     Active Member of Clubs or Organizations:     Attends Club or Organization Meetings:     Marital Status:    Intimate Partner Violence:     Fear of Current or Ex-Partner:     Emotionally Abused:     Physically Abused:     Sexually Abused: ALLERGIES: Latex, Adhesive, Arthrotec 50 [diclofenac-misoprostol], Chantix [varenicline], Gabapentin, Nsaids (non-steroidal anti-inflammatory drug), Requip [ropinirole], and Tylenol [acetaminophen]    Review of Systems   Constitutional: Negative for chills and fever. HENT: Negative for sinus pressure and sore throat. Eyes: Negative for visual disturbance. Respiratory: Negative for cough and shortness of breath. Cardiovascular: Negative for chest pain. Gastrointestinal: Negative for abdominal pain, diarrhea, nausea and vomiting.    Endocrine: Negative for polyuria. Genitourinary: Negative for difficulty urinating and dysuria. Musculoskeletal: Negative for neck pain and neck stiffness. Skin: Negative for rash. Neurological: Negative for weakness and headaches. Psychiatric/Behavioral: Positive for confusion. All other systems reviewed and are negative. Vitals:    10/01/21 1623   BP: (!) 157/66   Pulse: 67   Resp: 16   Temp: 97.7 °F (36.5 °C)   SpO2: 95%   Weight: 62.1 kg (137 lb)   Height: 5' (1.524 m)            Physical Exam  Vitals and nursing note reviewed. Constitutional:       Appearance: Normal appearance. She is not ill-appearing or toxic-appearing. HENT:      Head: Normocephalic and atraumatic. Nose: Nose normal.      Mouth/Throat:      Comments: Dry mucosal membranes  Eyes:      Extraocular Movements: Extraocular movements intact. Pupils: Pupils are equal, round, and reactive to light. Cardiovascular:      Rate and Rhythm: Normal rate and regular rhythm. Pulses: Normal pulses. Heart sounds: Normal heart sounds. Pulmonary:      Effort: Pulmonary effort is normal. No respiratory distress. Breath sounds: Normal breath sounds. Abdominal:      General: Abdomen is flat. There is no distension. Palpations: Abdomen is soft. Tenderness: There is no abdominal tenderness. Musculoskeletal:         General: Normal range of motion. Cervical back: Normal range of motion. No rigidity. Skin:     General: Skin is warm and dry. Neurological:      General: No focal deficit present. Mental Status: She is alert and oriented to person, place, and time. She is confused. Cranial Nerves: No cranial nerve deficit, dysarthria or facial asymmetry. Sensory: No sensory deficit. Motor: No weakness. Comments: Patient is oriented to person place and time. Patient has flight of ideas with tangential thought process.    Psychiatric:         Mood and Affect: Mood normal. MDM  Number of Diagnoses or Management Options  Altered mental status, unspecified altered mental status type  Diagnosis management comments: 79-year-old female presents the ER with son at bedside. Concern for altered mental status. Patient does have intermittent lucid intervals followed by continued confusion. Discharged from [facility] yesterday but son states she is no different. After speaking with patient's primary care physician this morning, primary care physician advised patient to be evaluated at the hospital.  Due to the nature of her chief complaint, a broad-based work-up was ordered. Head CT as well as chest x-ray without any emergent findings. Patient given 1 L bolus IV fluid secondary to her appearing dry. Labs show white count 18.1, elevated hemoglobin hematocrit, likely secondary to Volume depletion, normal electrolytes normal kidney function, normal liver enzymes, normal TSH, normal lactic acid and procalcitonin, CT of the head without emergent findings, chest x-ray was normal.  Urinalysis with 3-5 white cells, urine drug screen positive for THC, benzo and opiates, normal ammonia. Given patient's altered mental status, she would benefit from admission. Spoke with the hospitalist who agreed to meet this patient continued evaluation and treatment. Patient and family voiced understanding and agreement with this plan. EKG shows normal sinus rhythm, rate 68, , , QTc 417, normal axis, right bundle herbert block, no significant ST elevation or depression.        Amount and/or Complexity of Data Reviewed  Clinical lab tests: ordered and reviewed  Tests in the radiology section of CPT®: reviewed and ordered  Discussion of test results with the performing providers: yes  Decide to obtain previous medical records or to obtain history from someone other than the patient: yes  Obtain history from someone other than the patient: yes  Review and summarize past medical records: yes  Discuss the patient with other providers: yes  Independent visualization of images, tracings, or specimens: yes    Risk of Complications, Morbidity, and/or Mortality  Presenting problems: moderate  Diagnostic procedures: moderate  Management options: moderate           Procedures

## 2021-10-01 NOTE — ED TRIAGE NOTES
Patient ambulatory to triage with mask in place. Patient reports AMS that started last week. Pt was recently admitted to [facility] and treated. Son reports intermittent confusion. Son reports yesterday patient was unable to identify them. Son also reports pt told her MD that all of her family was dead and no one could take care of her. Son reports sx were a sudden onset last week.

## 2021-10-01 NOTE — ED NOTES
Pt's son reports pt Ethan Mcnamara been taking Xanax for anxiety and both morphine and oxycodone for severe scoliosis for years. \" Pt's son reports pt recently stopped taking all of them, and he isn't sure whether she just stopped taking them or whether one of her doctors took her off of them. Pt's son reports she was recently seen at [facility] and treated for low potassium and d/c to home. Pt has had intermittent confusion for 2 weeks. Pt's son reports pt \"will be lucid for about 10 minutes then become confused again. She knows she's confused when she's lucid. \" Pt has had both COVID vaccines. Hx of insulin dependent diabetes. Son reports pt has been taking insulin for years \"and this morning, she called her friend to ask how to check her blood sugar and how much insulin to take. \" Pt's son reports family Hx of mental illness. Denies pt has been diagnosed with any mental illness. Pt denies any physical symptoms or complaints aside from her chronic back pain.

## 2021-10-02 LAB
ATRIAL RATE: 68 BPM
CALCULATED P AXIS, ECG09: 32 DEGREES
CALCULATED R AXIS, ECG10: 71 DEGREES
CALCULATED T AXIS, ECG11: 19 DEGREES
DIAGNOSIS, 93000: NORMAL
ERYTHROCYTE [DISTWIDTH] IN BLOOD BY AUTOMATED COUNT: 15.5 % (ref 11.9–14.6)
GLUCOSE BLD STRIP.AUTO-MCNC: 128 MG/DL (ref 65–100)
GLUCOSE BLD STRIP.AUTO-MCNC: 167 MG/DL (ref 65–100)
GLUCOSE BLD STRIP.AUTO-MCNC: 187 MG/DL (ref 65–100)
GLUCOSE BLD STRIP.AUTO-MCNC: 74 MG/DL (ref 65–100)
HCT VFR BLD AUTO: 47.2 % (ref 35.8–46.3)
HGB BLD-MCNC: 14.9 G/DL (ref 11.7–15.4)
MCH RBC QN AUTO: 27.7 PG (ref 26.1–32.9)
MCHC RBC AUTO-ENTMCNC: 31.6 G/DL (ref 31.4–35)
MCV RBC AUTO: 87.9 FL (ref 79.6–97.8)
NRBC # BLD: 0 K/UL (ref 0–0.2)
P-R INTERVAL, ECG05: 118 MS
PLATELET # BLD AUTO: 304 K/UL (ref 150–450)
PMV BLD AUTO: 8.5 FL (ref 9.4–12.3)
Q-T INTERVAL, ECG07: 392 MS
QRS DURATION, ECG06: 120 MS
QTC CALCULATION (BEZET), ECG08: 416 MS
RBC # BLD AUTO: 5.37 M/UL (ref 4.05–5.2)
SERVICE CMNT-IMP: ABNORMAL
SERVICE CMNT-IMP: NORMAL
VENTRICULAR RATE, ECG03: 68 BPM
WBC # BLD AUTO: 15.5 K/UL (ref 4.3–11.1)

## 2021-10-02 PROCEDURE — 74011636637 HC RX REV CODE- 636/637: Performed by: HOSPITALIST

## 2021-10-02 PROCEDURE — 85027 COMPLETE CBC AUTOMATED: CPT

## 2021-10-02 PROCEDURE — 74011250637 HC RX REV CODE- 250/637: Performed by: NURSE PRACTITIONER

## 2021-10-02 PROCEDURE — 82962 GLUCOSE BLOOD TEST: CPT

## 2021-10-02 PROCEDURE — 99218 HC RM OBSERVATION: CPT

## 2021-10-02 PROCEDURE — 36415 COLL VENOUS BLD VENIPUNCTURE: CPT

## 2021-10-02 PROCEDURE — 74011250637 HC RX REV CODE- 250/637: Performed by: HOSPITALIST

## 2021-10-02 PROCEDURE — 2709999900 HC NON-CHARGEABLE SUPPLY

## 2021-10-02 RX ORDER — PANTOPRAZOLE SODIUM 40 MG/1
40 TABLET, DELAYED RELEASE ORAL
Status: DISCONTINUED | OUTPATIENT
Start: 2021-10-02 | End: 2021-10-04 | Stop reason: HOSPADM

## 2021-10-02 RX ORDER — METOPROLOL SUCCINATE 50 MG/1
50 TABLET, EXTENDED RELEASE ORAL DAILY
Status: DISCONTINUED | OUTPATIENT
Start: 2021-10-02 | End: 2021-10-04 | Stop reason: HOSPADM

## 2021-10-02 RX ORDER — DEXTROSE 50 % IN WATER (D50W) INTRAVENOUS SYRINGE
25-50 AS NEEDED
Status: DISCONTINUED | OUTPATIENT
Start: 2021-10-02 | End: 2021-10-04 | Stop reason: HOSPADM

## 2021-10-02 RX ORDER — HYDRALAZINE HYDROCHLORIDE 20 MG/ML
10 INJECTION INTRAMUSCULAR; INTRAVENOUS
Status: DISCONTINUED | OUTPATIENT
Start: 2021-10-02 | End: 2021-10-04 | Stop reason: HOSPADM

## 2021-10-02 RX ORDER — ROSUVASTATIN CALCIUM 10 MG/1
10 TABLET, COATED ORAL
Status: DISCONTINUED | OUTPATIENT
Start: 2021-10-02 | End: 2021-10-04 | Stop reason: HOSPADM

## 2021-10-02 RX ORDER — DEXTROSE 40 %
15 GEL (GRAM) ORAL AS NEEDED
Status: DISCONTINUED | OUTPATIENT
Start: 2021-10-02 | End: 2021-10-04 | Stop reason: HOSPADM

## 2021-10-02 RX ORDER — DIPHENHYDRAMINE HCL 25 MG
25 CAPSULE ORAL
Status: DISCONTINUED | OUTPATIENT
Start: 2021-10-02 | End: 2021-10-04 | Stop reason: HOSPADM

## 2021-10-02 RX ADMIN — ALPRAZOLAM 0.5 MG: 0.5 TABLET ORAL at 16:49

## 2021-10-02 RX ADMIN — Medication 10 ML: at 21:23

## 2021-10-02 RX ADMIN — HYDROCHLOROTHIAZIDE: 12.5 CAPSULE ORAL at 14:22

## 2021-10-02 RX ADMIN — Medication 10 ML: at 15:12

## 2021-10-02 RX ADMIN — CLONIDINE HYDROCHLORIDE 0.2 MG: 0.2 TABLET ORAL at 16:51

## 2021-10-02 RX ADMIN — INSULIN LISPRO 5 UNITS: 100 INJECTION, SOLUTION INTRAVENOUS; SUBCUTANEOUS at 08:23

## 2021-10-02 RX ADMIN — METOPROLOL SUCCINATE 50 MG: 50 TABLET, EXTENDED RELEASE ORAL at 12:48

## 2021-10-02 RX ADMIN — INSULIN LISPRO 3 UNITS: 100 INJECTION, SOLUTION INTRAVENOUS; SUBCUTANEOUS at 16:45

## 2021-10-02 RX ADMIN — PANTOPRAZOLE SODIUM 40 MG: 40 TABLET, DELAYED RELEASE ORAL at 12:48

## 2021-10-02 RX ADMIN — ROSUVASTATIN 10 MG: 10 TABLET, FILM COATED ORAL at 21:22

## 2021-10-02 RX ADMIN — INSULIN LISPRO 5 UNITS: 100 INJECTION, SOLUTION INTRAVENOUS; SUBCUTANEOUS at 16:46

## 2021-10-02 RX ADMIN — ALPRAZOLAM 0.5 MG: 0.5 TABLET ORAL at 08:23

## 2021-10-02 RX ADMIN — ASPIRIN 81 MG: 81 TABLET ORAL at 20:01

## 2021-10-02 RX ADMIN — ASPIRIN 81 MG: 81 TABLET ORAL at 08:23

## 2021-10-02 RX ADMIN — INSULIN LISPRO 2 UNITS: 100 INJECTION, SOLUTION INTRAVENOUS; SUBCUTANEOUS at 08:23

## 2021-10-02 RX ADMIN — DIPHENHYDRAMINE HYDROCHLORIDE 25 MG: 25 CAPSULE ORAL at 21:22

## 2021-10-02 NOTE — PROGRESS NOTES
Reviewed notes for spiritual concerns    Noted:    [facility]  [address]    [place]    DISABLED    FULL CODE    NO DIRECTIVES    OBS PT    MAJOR RECURRENT DEPRESSION        Will continue to assess how we can best serve this family              \" Prayers for our staff for safety and compassion \"

## 2021-10-02 NOTE — ED NOTES
TRANSFER - OUT REPORT:    Verbal report given to Gauri Salazar RN on Tim Jennifer  being transferred to Room #221 for routine progression of care       Report consisted of patients Situation, Background, Assessment and   Recommendations(SBAR). Information from the following report(s) SBAR, Kardex, ED Summary, Intake/Output, MAR and Recent Results was reviewed with the receiving nurse. Lines:   Peripheral IV 10/01/21 Left Antecubital (Active)   Site Assessment Clean, dry, & intact 10/01/21 1731   Phlebitis Assessment 0 10/01/21 1731   Infiltration Assessment 0 10/01/21 1731   Dressing Status Clean, dry, & intact 10/01/21 1731   Dressing Type Transparent 10/01/21 1731   Hub Color/Line Status Pink 10/01/21 1731        Opportunity for questions and clarification was provided.       Patient transported with:   [facility]

## 2021-10-02 NOTE — PROGRESS NOTES
END OF SHIFT NOTE:    INTAKE/OUTPUT  10/01 0701 - 10/02 0700  In: Bhavik Meier [P.O.:240; I.V.:1632]  Out: 750 [Urine:750]  Voiding: YES  Catheter: NO  Drain:              Flatus: Patient does have flatus present. Stool:  1 occurrences. Characteristics:  Stool Assessment  Stool Color: Brown  Stool Appearance: Soft  Stool Amount: Medium  Stool Source/Status: Rectum    Emesis: 0 occurrences. Characteristics:        VITAL SIGNS  Patient Vitals for the past 12 hrs:   Temp Pulse Resp BP SpO2   10/02/21 0407 98.4 °F (36.9 °C) 72 18 (!) 153/65 95 %   10/01/21 2233  64  (!) 159/58    10/01/21 2059 98.2 °F (36.8 °C) 76 18 (!) 188/71 95 %   10/01/21 1949  71  (!) 164/68 95 %   10/01/21 1930  79   96 %       Pain Assessment  Pain Intensity 1: 3 (10/01/21 2237)  Pain Location 1: Foot  Pain Intervention(s) 1: Repositioned  Patient Stated Pain Goal: 1    Ambulating  Yes    Shift report given to oncoming nurse at the bedside.     Pily Harkins RN

## 2021-10-02 NOTE — PROGRESS NOTES
END OF SHIFT NOTE:    INTAKE/OUTPUT  10/01 0701 - 10/02 0700  In: Dayana Section [P.O.:240; I.V.:1632]  Out: 750 [Urine:750]  Voiding: YES  Catheter: NO  Drain:              Flatus: Patient does have flatus present. Stool:  1 occurrences. Characteristics:  Stool Assessment  Stool Color: Brown (orangish color)  Stool Appearance: Watery, Loose  Stool Amount: Large  Stool Source/Status: Rectum    Emesis: 0 occurrences. Characteristics:        VITAL SIGNS  Patient Vitals for the past 12 hrs:   Temp Pulse Resp BP SpO2   10/02/21 1651 97.8 °F (36.6 °C) 71 18 (!) 173/60 95 %   10/02/21 1138 99.1 °F (37.3 °C) 86 18 (!) 156/70 96 %   10/02/21 0721 98.2 °F (36.8 °C) 84 18 (!) 175/67 96 %       Pain Assessment  Pain Intensity 1: 0 (10/02/21 0721)  Pain Location 1: Foot  Pain Intervention(s) 1: Repositioned  Patient Stated Pain Goal: 0    Ambulating  Yes    Shift report given to oncoming nurse at the bedside.     Cynthia Quevedo RN

## 2021-10-02 NOTE — PROGRESS NOTES
Pt's meds that were at bedside along with Glucometer given to . Verified husbands name and license at time of delivering meds to him.  is Fidelu. Informed pt that nurse was giving Lee all medications, but her narcotics are locked in lock box. Also informed  that it is important when pt is discharged to remind nursing staff that pt's narcotics is locked in lock box.

## 2021-10-02 NOTE — PROGRESS NOTES
10/01/21 2127   Dual Skin Pressure Injury Assessment   Dual Skin Pressure Injury Assessment WDL   Second Care Provider (Based on Facility Policy) Pelon Goodman RN   Skin Integumentary   Skin Integumentary (WDL) X    Pressure  Injury Documentation No Pressure Injury Noted-Pressure Ulcer Prevention Initiated   Skin Color Ecchymosis (comment)  (scattered upper extremites)   Skin Condition/Temp Dry; Warm

## 2021-10-02 NOTE — PROGRESS NOTES
Problem: Falls - Risk of  Goal: *Absence of Falls  Description: Document Edin López Fall Risk and appropriate interventions in the flowsheet. Outcome: Progressing Towards Goal  Note: Fall Risk Interventions:       Mentation Interventions: Adequate sleep, hydration, pain control, Bed/chair exit alarm, Door open when patient unattended, Evaluate medications/consider consulting pharmacy, Eyeglasses and hearing aids    Medication Interventions: Bed/chair exit alarm, Evaluate medications/consider consulting pharmacy, Patient to call before getting OOB, Teach patient to arise slowly                   Problem: Patient Education: Go to Patient Education Activity  Goal: Patient/Family Education  Outcome: Progressing Towards Goal     Problem: Diabetes Self-Management  Goal: *Disease process and treatment process  Description: Define diabetes and identify own type of diabetes; list 3 options for treating diabetes. Outcome: Progressing Towards Goal  Goal: *Incorporating nutritional management into lifestyle  Description: Describe effect of type, amount and timing of food on blood glucose; list 3 methods for planning meals. Outcome: Progressing Towards Goal  Goal: *Incorporating physical activity into lifestyle  Description: State effect of exercise on blood glucose levels. Outcome: Progressing Towards Goal  Goal: *Developing strategies to promote health/change behavior  Description: Define the ABC's of diabetes; identify appropriate screenings, schedule and personal plan for screenings. Outcome: Progressing Towards Goal  Goal: *Using medications safely  Description: State effect of diabetes medications on diabetes; name diabetes medication taking, action and side effects. Outcome: Progressing Towards Goal  Goal: *Monitoring blood glucose, interpreting and using results  Description: Identify recommended blood glucose targets  and personal targets.   Outcome: Progressing Towards Goal  Goal: *Prevention, detection, treatment of acute complications  Description: List symptoms of hyper- and hypoglycemia; describe how to treat low blood sugar and actions for lowering  high blood glucose level. Outcome: Progressing Towards Goal  Goal: *Prevention, detection and treatment of chronic complications  Description: Define the natural course of diabetes and describe the relationship of blood glucose levels to long term complications of diabetes.   Outcome: Progressing Towards Goal  Goal: *Developing strategies to address psychosocial issues  Description: Describe feelings about living with diabetes; identify support needed and support network  Outcome: Progressing Towards Goal  Goal: *Sick day guidelines  Outcome: Progressing Towards Goal  Goal: *Patient Specific Goal (EDIT GOAL, INSERT TEXT)  Outcome: Progressing Towards Goal     Problem: Patient Education: Go to Patient Education Activity  Goal: Patient/Family Education  Outcome: Progressing Towards Goal     Problem: Anxiety  Goal: *Alleviation of anxiety  Outcome: Progressing Towards Goal     Problem: Patient Education: Go to Patient Education Activity  Goal: Patient/Family Education  Outcome: Progressing Towards Goal

## 2021-10-02 NOTE — PROGRESS NOTES
Hospitalist Progress Note   Admit Date:  10/1/2021  4:25 PM   Name:  Claudette Edelman   Age:  78 y.o. Sex:  female  :  1955   MRN:  [id number]   Room:  221    Presenting Complaint: Altered mental status    Reason(s) for Admission: Acute encephalopathy [G93.40]     Hospital Course & Interval History:   Ms. Ludin Lerma is a 77 y/o F w/ hx of bipolar, HTN, DM, and colon ca who presented with confusion. She was discharged from another hospital on  after being treated for altered mental status and dehydration. On  her PCP was concerned with continued confusion and advised that she come here for re-evaluation. She stated that she has not taken her alprazolam in 1 week but had been chronically taking 1 mg 3 times daily. She was admitted for acute encephalopathy likely secondary to benzodiazepine withdrawal.    Subjective (10/02/21):  No AEO. Ms. Ludin Lerma is calm and oriented x 3 this morning. Has wandering thoughts. She has no complaints. Continue close monitoring. Follow a.m. labs.     Assessment & Plan:     Acute encephalopathy (10/1/2021)  - likely 2/2 benzo withdrawal  - on admission, was started on alprazolam 0.5 mg BID    Leukocytosis, improving  - LA, PCT wnl  - Afebrile  - Trend and monitor    Hypertension  - Resume home medications  - PRN hydralazine    DM Type 2  - on admission, started on 5 units lispro w/ meals  - correction scale lispro    GERD  - Cont home PPI    Dyslipidemia  - Cont home statin        Dispo/Discharge Planning: ~2 midnights    Diet:  ADULT DIET Regular; 3 carb choices (45 gm/meal)  DVT PPx: SCD  Code status: Full Code    Hospital Problems as of 10/2/2021 Date Reviewed: 2021        Codes Class Noted - Resolved POA    Acute encephalopathy ICD-10-CM: G93.40  ICD-9-CM: 348.30  10/1/2021 - Present Unknown              Objective:     Patient Vitals for the past 24 hrs:   Temp Pulse Resp BP SpO2   10/02/21 0721 98.2 °F (36.8 °C) 84 18 (!) 175/67 96 % 10/02/21 0407 98.4 °F (36.9 °C) 72 18 (!) 153/65 95 %   10/01/21 2233  64  (!) 159/58    10/01/21 2059 98.2 °F (36.8 °C) 76 18 (!) 188/71 95 %   10/01/21 1949  71  (!) 164/68 95 %   10/01/21 1930  79   96 %   10/01/21 1755  68  (!) 180/73 94 %   10/01/21 1733     95 %   10/01/21 1726  68   95 %   10/01/21 1724  89  (!) 170/74    10/01/21 1623 97.7 °F (36.5 °C) 67 16 (!) 157/66 95 %     Oxygen Therapy  O2 Sat (%): 96 % (10/02/21 0721)  Pulse via Oximetry: 71 beats per minute (10/01/21 1949)  O2 Device: None (Room air) (10/01/21 1623)    Estimated body mass index is 26.76 kg/m² as calculated from the following:    Height as of this encounter: 5' (1.524 m). Weight as of this encounter: 62.1 kg (137 lb). Intake/Output Summary (Last 24 hours) at 10/2/2021 1013  Last data filed at 10/2/2021 7741  Gross per 24 hour   Intake 1872 ml   Output 1450 ml   Net 422 ml         Physical Exam:   General:    No overt distress  Head:  Normocephalic, atraumatic  Eyes:  Sclerae appear normal.  Pupils equally round. ENT:  Nares appear normal, no drainage. Moist oral mucosa  Neck:  No restricted ROM. Trachea midline   CV:   RRR. No m/r/g. Lungs: No wheezing. Respirations even, unlabored on room air. Abdomen:   Soft, nontender, nondistended. Extremities: No cyanosis or clubbing. Skin:     No rashes and normal coloration. Warm and dry. Neuro:  Cranial nerves II-XII grossly intact. A&Ox3  Psych:  Normal mood and affect.       I have reviewed ordered lab tests and independently visualized imaging below:    Last 24hr Labs:  Recent Results (from the past 24 hour(s))   CBC WITH AUTOMATED DIFF    Collection Time: 10/01/21  4:27 PM   Result Value Ref Range    WBC 18.1 (H) 4.3 - 11.1 K/uL    RBC 5.58 (H) 4.05 - 5.2 M/uL    HGB 15.6 (H) 11.7 - 15.4 g/dL    HCT 48.5 (H) 35.8 - 46.3 %    MCV 86.9 79.6 - 97.8 FL    MCH 28.0 26.1 - 32.9 PG    MCHC 32.2 31.4 - 35.0 g/dL    RDW 15.5 (H) 11.9 - 14.6 % PLATELET 701 978 - 536 K/uL    MPV 8.3 (L) 9.4 - 12.3 FL    ABSOLUTE NRBC 0.00 0.0 - 0.2 K/uL    NEUTROPHILS 52 43 - 78 %    LYMPHOCYTES 39 13 - 44 %    MONOCYTES 6 4.0 - 12.0 %    EOSINOPHILS 2 0.5 - 7.8 %    BASOPHILS 1 0.0 - 2.0 %    IMMATURE GRANULOCYTES 0 0.0 - 5.0 %    ABS. NEUTROPHILS 9.3 (H) 1.7 - 8.2 K/UL    ABS. LYMPHOCYTES 7.1 (H) 0.5 - 4.6 K/UL    ABS. MONOCYTES 1.1 0.1 - 1.3 K/UL    ABS. EOSINOPHILS 0.4 0.0 - 0.8 K/UL    ABS. BASOPHILS 0.2 0.0 - 0.2 K/UL    ABS. IMM. GRANS. 0.0 0.0 - 0.5 K/UL    RBC COMMENTS SLIGHT  ANISOCYTOSIS + POIKILOCYTOSIS        WBC COMMENTS OCCASIONAL      PLATELET COMMENTS ADEQUATE      DF AUTOMATED     METABOLIC PANEL, COMPREHENSIVE    Collection Time: 10/01/21  4:27 PM   Result Value Ref Range    Sodium 139 136 - 145 mmol/L    Potassium 3.8 3.5 - 5.1 mmol/L    Chloride 104 98 - 107 mmol/L    CO2 30 21 - 32 mmol/L    Anion gap 5 (L) 7 - 16 mmol/L    Glucose 68 65 - 100 mg/dL    BUN 10 8 - 23 MG/DL    Creatinine 0.57 (L) 0.6 - 1.0 MG/DL    GFR est AA >60 >60 ml/min/1.73m2    GFR est non-AA >60 >60 ml/min/1.73m2    Calcium 9.5 8.3 - 10.4 MG/DL    Bilirubin, total 0.6 0.2 - 1.1 MG/DL    ALT (SGPT) 50 12 - 65 U/L    AST (SGOT) 26 15 - 37 U/L    Alk.  phosphatase 93 50 - 136 U/L    Protein, total 7.5 6.3 - 8.2 g/dL    Albumin 3.6 3.2 - 4.6 g/dL    Globulin 3.9 (H) 2.3 - 3.5 g/dL    A-G Ratio 0.9 (L) 1.2 - 3.5     TSH 3RD GENERATION    Collection Time: 10/01/21  4:27 PM   Result Value Ref Range    TSH 2.290 0.358 - 3.740 uIU/mL   LACTIC ACID    Collection Time: 10/01/21  4:27 PM   Result Value Ref Range    Lactic acid 0.5 0.4 - 2.0 MMOL/L   PROCALCITONIN    Collection Time: 10/01/21  4:27 PM   Result Value Ref Range    Procalcitonin <0.05 ng/mL   EKG, 12 LEAD, INITIAL    Collection Time: 10/01/21  5:08 PM   Result Value Ref Range    Ventricular Rate 68 BPM    Atrial Rate 68 BPM    P-R Interval 118 ms    QRS Duration 120 ms    Q-T Interval 392 ms    QTC Calculation (Bezet) 416 ms Calculated P Axis 32 degrees    Calculated R Axis 71 degrees    Calculated T Axis 19 degrees    Diagnosis       Normal sinus rhythm  Right bundle branch block  When compared with ECG of 29-AUG-2015 22:39,  Premature atrial complexes are no longer Present  Right bundle branch block is now Present  Confirmed by Juancarlos ADAMS, Liam Andino ([id number]) on 10/2/2021 8:04:52 AM     DRUG SCREEN, URINE    Collection Time: 10/01/21  5:14 PM   Result Value Ref Range    PCP(PHENCYCLIDINE) Negative      BENZODIAZEPINES Positive      COCAINE Negative      AMPHETAMINES Negative      METHADONE Negative      THC (TH-CANNABINOL) Positive      OPIATES Positive      BARBITURATES Negative     URINE MICROSCOPIC    Collection Time: 10/01/21  5:14 PM   Result Value Ref Range    WBC 3-5 0 /hpf    RBC 3-5 0 /hpf    Epithelial cells 5-10 0 /hpf    Bacteria 0 0 /hpf    Casts 3-5 0 /lpf   AMMONIA    Collection Time: 10/01/21  5:32 PM   Result Value Ref Range    Ammonia 17 11 - 32 UMOL/L   GLUCOSE, POC    Collection Time: 10/01/21 10:00 PM   Result Value Ref Range    Glucose (POC) 68 65 - 100 mg/dL    Performed by Vino VoloequaPCT    GLUCOSE, POC    Collection Time: 10/01/21 10:30 PM   Result Value Ref Range    Glucose (POC) 77 65 - 100 mg/dL    Performed by monEchelleaPCT    CBC W/O DIFF    Collection Time: 10/02/21  4:43 AM   Result Value Ref Range    WBC 15.5 (H) 4.3 - 11.1 K/uL    RBC 5.37 (H) 4.05 - 5.2 M/uL    HGB 14.9 11.7 - 15.4 g/dL    HCT 47.2 (H) 35.8 - 46.3 %    MCV 87.9 79.6 - 97.8 FL    MCH 27.7 26.1 - 32.9 PG    MCHC 31.6 31.4 - 35.0 g/dL    RDW 15.5 (H) 11.9 - 14.6 %    PLATELET 182 817 - 527 K/uL    MPV 8.5 (L) 9.4 - 12.3 FL    ABSOLUTE NRBC 0.00 0.0 - 0.2 K/uL   GLUCOSE, POC    Collection Time: 10/02/21  7:20 AM   Result Value Ref Range    Glucose (POC) 167 (H) 65 - 100 mg/dL    Performed by Rosie        All Micro Results     None          Other Studies:  XR CHEST PA LAT    Result Date: 10/1/2021  History: ams, r/o pna Two views chest COMPARISON: 4/13/2020 Findings: The lungs are well expanded and clear. The cardiac silhouette, and mediastinal contour, and osseous structures are stable. Stable two-view chest.     CT HEAD WO CONT    Result Date: 10/1/2021  CT HEAD WITHOUT CONTRAST. INDICATION: Altered mental status and intermittent confusion. COMPARISON: July 2014  TECHNIQUE:   5 mm axial scans from the skull base to the vertex. Our CT scanners use one or more of the following:  Automated exposure control, adjustment of the mA and or kV according to patient size, iterative reconstruction. FINDINGS:  No acute intraparenchymal hemorrhage or abnormal extra-axial fluid collection. The ventricles are normal size. No midline shift or mass effect. Posterior fossa: Unremarkable. Included portion of the: Paranasal sinuses and mastoid air cells are clear. Globes and orbits grossly unremarkable. Negative for acute intracranial abnormality.        Current Meds:  Current Facility-Administered Medications   Medication Dose Route Frequency    dextrose 40% (GLUTOSE) oral gel 1 Tube  15 g Oral PRN    glucagon (GLUCAGEN) injection 1 mg  1 mg IntraMUSCular PRN    dextrose (D50W) injection syrg 12.5-25 g  25-50 mL IntraVENous PRN    ALPRAZolam (XANAX) tablet 0.5 mg  0.5 mg Oral TID PRN    amLODIPine (NORVASC) tablet 10 mg  10 mg Oral DAILY    aspirin delayed-release tablet 81 mg  81 mg Oral Q12H    0.9% sodium chloride infusion  100 mL/hr IntraVENous CONTINUOUS    sodium chloride (NS) flush 5-40 mL  5-40 mL IntraVENous Q8H    sodium chloride (NS) flush 5-40 mL  5-40 mL IntraVENous PRN    acetaminophen (TYLENOL) tablet 650 mg  650 mg Oral Q6H PRN    Or    acetaminophen (TYLENOL) suppository 650 mg  650 mg Rectal Q6H PRN    senna (SENOKOT) tablet 17.2 mg  2 Tablet Oral BID PRN    ondansetron (ZOFRAN) injection 4 mg  4 mg IntraVENous Q6H PRN    insulin lispro (HUMALOG) injection   SubCUTAneous AC&HS    cloNIDine HCL (CATAPRES) tablet 0.2 mg  0.2 mg Oral BID PRN    hydrALAZINE (APRESOLINE) 20 mg/mL injection 10 mg  10 mg IntraVENous Q6H PRN    insulin lispro (HUMALOG) injection 5 Units  5 Units SubCUTAneous TIDAC    ALPRAZolam (XANAX) tablet 0.5 mg  0.5 mg Oral BID       Signed:  Melissa Trevizo NP    Part of this note may have been written by using a voice dictation software. The note has been proof read but may still contain some grammatical/other typographical errors.

## 2021-10-02 NOTE — PROGRESS NOTES
TRANSFER - IN REPORT:    Verbal report received from [facility] on Wilver Richardson  being received from ER for routine progression of care      Report consisted of patients Situation, Background, Assessment and   Recommendations(SBAR). Information from the following report(s) Kardex, ED Summary, Intake/Output, MAR and Recent Results was reviewed with the receiving nurse. Opportunity for questions and clarification was provided. Assessment completed upon patients arrival to unit and care assumed.

## 2021-10-03 LAB
ANION GAP SERPL CALC-SCNC: 8 MMOL/L (ref 7–16)
BUN SERPL-MCNC: 6 MG/DL (ref 8–23)
CALCIUM SERPL-MCNC: 9.6 MG/DL (ref 8.3–10.4)
CHLORIDE SERPL-SCNC: 103 MMOL/L (ref 98–107)
CO2 SERPL-SCNC: 27 MMOL/L (ref 21–32)
CREAT SERPL-MCNC: 0.43 MG/DL (ref 0.6–1)
ERYTHROCYTE [DISTWIDTH] IN BLOOD BY AUTOMATED COUNT: 15 % (ref 11.9–14.6)
GLUCOSE BLD STRIP.AUTO-MCNC: 162 MG/DL (ref 65–100)
GLUCOSE BLD STRIP.AUTO-MCNC: 165 MG/DL (ref 65–100)
GLUCOSE BLD STRIP.AUTO-MCNC: 166 MG/DL (ref 65–100)
GLUCOSE BLD STRIP.AUTO-MCNC: 172 MG/DL (ref 65–100)
GLUCOSE SERPL-MCNC: 177 MG/DL (ref 65–100)
HCT VFR BLD AUTO: 46.3 % (ref 35.8–46.3)
HGB BLD-MCNC: 14.9 G/DL (ref 11.7–15.4)
MCH RBC QN AUTO: 27.1 PG (ref 26.1–32.9)
MCHC RBC AUTO-ENTMCNC: 32.2 G/DL (ref 31.4–35)
MCV RBC AUTO: 84.2 FL (ref 79.6–97.8)
NRBC # BLD: 0 K/UL (ref 0–0.2)
PLATELET # BLD AUTO: 293 K/UL (ref 150–450)
PMV BLD AUTO: 8.1 FL (ref 9.4–12.3)
POTASSIUM SERPL-SCNC: 2.9 MMOL/L (ref 3.5–5.1)
RBC # BLD AUTO: 5.5 M/UL (ref 4.05–5.2)
SERVICE CMNT-IMP: ABNORMAL
SODIUM SERPL-SCNC: 138 MMOL/L (ref 136–145)
WBC # BLD AUTO: 17.1 K/UL (ref 4.3–11.1)

## 2021-10-03 PROCEDURE — 99218 HC RM OBSERVATION: CPT

## 2021-10-03 PROCEDURE — 74011250637 HC RX REV CODE- 250/637: Performed by: HOSPITALIST

## 2021-10-03 PROCEDURE — 74011250637 HC RX REV CODE- 250/637: Performed by: NURSE PRACTITIONER

## 2021-10-03 PROCEDURE — 96372 THER/PROPH/DIAG INJ SC/IM: CPT

## 2021-10-03 PROCEDURE — 74011250636 HC RX REV CODE- 250/636: Performed by: NURSE PRACTITIONER

## 2021-10-03 PROCEDURE — 74011250637 HC RX REV CODE- 250/637: Performed by: FAMILY MEDICINE

## 2021-10-03 PROCEDURE — 65270000029 HC RM PRIVATE

## 2021-10-03 PROCEDURE — 85027 COMPLETE CBC AUTOMATED: CPT

## 2021-10-03 PROCEDURE — 74011636637 HC RX REV CODE- 636/637: Performed by: HOSPITALIST

## 2021-10-03 PROCEDURE — 82962 GLUCOSE BLOOD TEST: CPT

## 2021-10-03 PROCEDURE — 36415 COLL VENOUS BLD VENIPUNCTURE: CPT

## 2021-10-03 PROCEDURE — 80048 BASIC METABOLIC PNL TOTAL CA: CPT

## 2021-10-03 RX ORDER — TEMAZEPAM 15 MG/1
15 CAPSULE ORAL
Status: DISCONTINUED | OUTPATIENT
Start: 2021-10-03 | End: 2021-10-04 | Stop reason: HOSPADM

## 2021-10-03 RX ORDER — POTASSIUM CHLORIDE 20 MEQ/1
40 TABLET, EXTENDED RELEASE ORAL 2 TIMES DAILY
Status: DISCONTINUED | OUTPATIENT
Start: 2021-10-03 | End: 2021-10-04 | Stop reason: HOSPADM

## 2021-10-03 RX ORDER — POTASSIUM CHLORIDE 20 MEQ/1
40 TABLET, EXTENDED RELEASE ORAL ONCE
Status: DISCONTINUED | OUTPATIENT
Start: 2021-10-03 | End: 2021-10-03

## 2021-10-03 RX ORDER — MONTELUKAST SODIUM 10 MG/1
10 TABLET ORAL
Status: DISCONTINUED | OUTPATIENT
Start: 2021-10-03 | End: 2021-10-04 | Stop reason: HOSPADM

## 2021-10-03 RX ORDER — CHOLECALCIFEROL (VITAMIN D3) 125 MCG
5 CAPSULE ORAL
Status: DISCONTINUED | OUTPATIENT
Start: 2021-10-03 | End: 2021-10-04 | Stop reason: HOSPADM

## 2021-10-03 RX ORDER — CETIRIZINE HYDROCHLORIDE 5 MG/1
5 TABLET ORAL EVERY EVENING
Status: DISCONTINUED | OUTPATIENT
Start: 2021-10-03 | End: 2021-10-04 | Stop reason: HOSPADM

## 2021-10-03 RX ORDER — POTASSIUM CHLORIDE 14.9 MG/ML
20 INJECTION INTRAVENOUS
Status: DISCONTINUED | OUTPATIENT
Start: 2021-10-03 | End: 2021-10-03

## 2021-10-03 RX ORDER — ENOXAPARIN SODIUM 100 MG/ML
40 INJECTION SUBCUTANEOUS EVERY 24 HOURS
Status: DISCONTINUED | OUTPATIENT
Start: 2021-10-03 | End: 2021-10-04 | Stop reason: HOSPADM

## 2021-10-03 RX ADMIN — AMLODIPINE BESYLATE 10 MG: 10 TABLET ORAL at 09:07

## 2021-10-03 RX ADMIN — ALPRAZOLAM 0.5 MG: 0.5 TABLET ORAL at 17:00

## 2021-10-03 RX ADMIN — ASPIRIN 81 MG: 81 TABLET ORAL at 09:07

## 2021-10-03 RX ADMIN — ALPRAZOLAM 0.5 MG: 0.5 TABLET ORAL at 00:24

## 2021-10-03 RX ADMIN — TEMAZEPAM 15 MG: 15 CAPSULE ORAL at 02:30

## 2021-10-03 RX ADMIN — Medication 10 ML: at 05:33

## 2021-10-03 RX ADMIN — HYDROCHLOROTHIAZIDE: 12.5 CAPSULE ORAL at 09:06

## 2021-10-03 RX ADMIN — INSULIN LISPRO 2 UNITS: 100 INJECTION, SOLUTION INTRAVENOUS; SUBCUTANEOUS at 21:07

## 2021-10-03 RX ADMIN — INSULIN LISPRO 5 UNITS: 100 INJECTION, SOLUTION INTRAVENOUS; SUBCUTANEOUS at 17:01

## 2021-10-03 RX ADMIN — PANTOPRAZOLE SODIUM 40 MG: 40 TABLET, DELAYED RELEASE ORAL at 05:33

## 2021-10-03 RX ADMIN — CETIRIZINE HYDROCHLORIDE 5 MG: 5 TABLET ORAL at 17:01

## 2021-10-03 RX ADMIN — Medication 10 ML: at 14:00

## 2021-10-03 RX ADMIN — POTASSIUM CHLORIDE 40 MEQ: 20 TABLET, EXTENDED RELEASE ORAL at 09:07

## 2021-10-03 RX ADMIN — Medication 5 MG: at 21:07

## 2021-10-03 RX ADMIN — INSULIN LISPRO 2 UNITS: 100 INJECTION, SOLUTION INTRAVENOUS; SUBCUTANEOUS at 17:01

## 2021-10-03 RX ADMIN — POTASSIUM CHLORIDE 40 MEQ: 20 TABLET, EXTENDED RELEASE ORAL at 17:01

## 2021-10-03 RX ADMIN — Medication 10 ML: at 21:10

## 2021-10-03 RX ADMIN — INSULIN LISPRO 2 UNITS: 100 INJECTION, SOLUTION INTRAVENOUS; SUBCUTANEOUS at 09:10

## 2021-10-03 RX ADMIN — INSULIN LISPRO 5 UNITS: 100 INJECTION, SOLUTION INTRAVENOUS; SUBCUTANEOUS at 12:32

## 2021-10-03 RX ADMIN — METOPROLOL SUCCINATE 50 MG: 50 TABLET, EXTENDED RELEASE ORAL at 09:08

## 2021-10-03 RX ADMIN — Medication 5 MG: at 01:43

## 2021-10-03 RX ADMIN — ALPRAZOLAM 0.5 MG: 0.5 TABLET ORAL at 09:08

## 2021-10-03 RX ADMIN — ROSUVASTATIN 10 MG: 10 TABLET, FILM COATED ORAL at 21:07

## 2021-10-03 RX ADMIN — INSULIN LISPRO 2 UNITS: 100 INJECTION, SOLUTION INTRAVENOUS; SUBCUTANEOUS at 12:30

## 2021-10-03 RX ADMIN — ENOXAPARIN SODIUM 40 MG: 40 INJECTION SUBCUTANEOUS at 12:33

## 2021-10-03 RX ADMIN — TEMAZEPAM 15 MG: 15 CAPSULE ORAL at 22:08

## 2021-10-03 RX ADMIN — ASPIRIN 81 MG: 81 TABLET ORAL at 21:07

## 2021-10-03 RX ADMIN — INSULIN LISPRO 5 UNITS: 100 INJECTION, SOLUTION INTRAVENOUS; SUBCUTANEOUS at 09:12

## 2021-10-03 RX ADMIN — MONTELUKAST 10 MG: 10 TABLET, FILM COATED ORAL at 21:07

## 2021-10-03 NOTE — PROGRESS NOTES
Nutrition Assessment     Type and Reason for Visit: Positive nutrition screen    Nutrition Recommendations/Plan:   Continue current diet  Start oral nutrition supplement Glucerna 1x daily to supplement nutrition given fair intake. Nutrition Assessment:  78year old female with DM, HTN, recent admission to another hospital for confusion and dehydration admitted for acute encephalotapthy. Hypokalemic, replacing. A1C= 7.4  LBM 10/2/21- loose/watery. Completed \"half\" of lunch meal today. Malnutrition Assessment:  Malnutrition Status: At risk, unsure of UBW, voices a decrease in appetite PTA. Eating better today. Estimated Daily Nutrient Needs:  Energy (kcal):  5787-0785 (25-28kcal/kg bw)  Protein (g):  74-81 (1.2-1.3g/kg BW)       Fluid (ml/day):    1ml/kcal    Nutrition Related Findings:  Remote assessmet      Current Nutrition Therapies:  ADULT DIET Regular; 3 carb choices (45 gm/meal)    Anthropometric Measures:  · Height:  5' (152.4 cm)  · Current Body Wt:  62.1 kg (136 lb 14.5 oz)  · BMI: 26.7    Nutrition Diagnosis:   · Predicted inadequate energy intake related to cognitive or neurological impairment as evidenced by poor intake prior to admission    Nutrition Intervention:  Food and/or Nutrient Delivery: Continue current diet, Start oral nutrition supplement  Nutrition Education and Counseling: Education not appropriate  Coordination of Nutrition Care:  no needs identified. Goals:  <75% of meal intake      Nutrition Monitoring and Evaluation:   Behavioral-Environmental Outcomes:    Food/Nutrient Intake Outcomes: Food and nutrient intake, Supplement intake  Physical Signs/Symptoms Outcomes: Weight    Discharge Planning:    No needs identified.     Electronically signed by Brianda Mccrary on 10/3/2021 at 1:58 PM

## 2021-10-03 NOTE — PROGRESS NOTES
END OF SHIFT NOTE:    INTAKE/OUTPUT  10/02 0701 - 10/03 0700  In: 660 [I.V.:684]  Out: 1300 [Urine:1300]  Voiding: YES  Catheter: NO  Drain:        Flatus: Patient does have flatus present. Stool:  1 occurrences. Characteristics:  Stool Assessment  Stool Color: Brown  Stool Appearance: Formed, Loose (very small formed parts per pt)  Stool Amount: Small  Stool Source/Status: Rectum    Emesis: 0 occurrences. Characteristics:        VITAL SIGNS  Patient Vitals for the past 12 hrs:   Temp Pulse Resp BP SpO2   10/03/21 1936 98 °F (36.7 °C) 61 17 (!) 168/77 98 %   10/03/21 1550 98.5 °F (36.9 °C) 77 18 (!) 143/61 97 %   10/03/21 1145 98 °F (36.7 °C) 71 18 (!) 172/82 95 %   10/03/21 0906  69  (!) 166/65        Pain Assessment  Pain Intensity 1: 0 (denies) (10/03/21 1320)  Pain Location 1: Foot  Pain Intervention(s) 1: Repositioned  Patient Stated Pain Goal: 0    Ambulating  Yes. Ambulates very well around room and to bathroom. Tolerates very well. Shift report given to oncoming nurse at the bedside.     Divya Burks RN

## 2021-10-03 NOTE — PROGRESS NOTES
Hospitalist Progress Note   Admit Date:  10/1/2021  4:25 PM   Name:  Carlin Power   Age:  78 y.o. Sex:  female  :  1955   MRN:  [id number]   Room:  221/    Presenting Complaint: Altered mental status    Reason(s) for Admission: Acute encephalopathy [G93.40]     Hospital Course & Interval History:   Ms. Vi Watts is a 77 y/o F w/ hx of bipolar, HTN, DM, and colon ca who presented with confusion. She was discharged from another hospital on  after being treated for altered mental status and dehydration. On  her PCP was concerned with continued confusion and advised that she come here for re-evaluation. She stated that she has not taken her alprazolam in 1 week but had been chronically taking 1 mg 3 times daily. She was admitted for acute encephalopathy likely secondary to benzodiazepine withdrawal.    Subjective (10/03/21):  Per nursing, patient anxious w/ insomnia overnight. Ms. Vi Watts tells me this morning that she eventually did sleep. She is eating breakfast in NAD. Afebrile, denies overnight sweats. Assessment & Plan:     Acute encephalopathy (10/1/2021)  - likely 2/2 benzo withdrawal  - on admission, was started on alprazolam 0.5 mg BID  - ammonia 17     Leukocytosis  - LA, PCT wnl  - Afebrile  - Trend and monitor  Oct/03: WBC up-trend; CBC in a.m.   - Afebrile, no symptoms, CXR clear on admission    Acute hypokalemia  Oct/03: 2.9; replace PO as patient refuses IV potassium   - a.m.  BMP     Hypertension  - Resume home medications  - PRN hydralazine     DM Type 2  - A1c 7.4 on Aug/04  - on admission, started on 5 units lispro w/ meals  - correction scale lispro     GERD  - Cont home PPI     Dyslipidemia  - Cont home statin      Dispo/Discharge Planning: ~ 2 midnights    Diet:  ADULT DIET Regular; 3 carb choices (45 gm/meal)  DVT PPx: enoxaparin  Code status: Full Code    Hospital Problems as of 10/3/2021 Date Reviewed: 2021        Codes Class Noted - Resolved POA * (Principal) Acute encephalopathy ICD-10-CM: G93.40  ICD-9-CM: 348.30  10/1/2021 - Present Unknown              Objective:     Patient Vitals for the past 24 hrs:   Temp Pulse Resp BP SpO2   10/03/21 0906  69  (!) 166/65    10/03/21 0326 98.1 °F (36.7 °C) (!) 58 18 129/71 96 %   10/02/21 2242 98.2 °F (36.8 °C) (!) 59 18 (!) 146/66 96 %   10/02/21 1953 97.8 °F (36.6 °C) 62 18 137/64 98 %   10/02/21 1651 97.8 °F (36.6 °C) 71 18 (!) 173/60 95 %   10/02/21 1138 99.1 °F (37.3 °C) 86 18 (!) 156/70 96 %     Oxygen Therapy  O2 Sat (%): 96 % (10/03/21 0326)  Pulse via Oximetry: 71 beats per minute (10/01/21 1949)  O2 Device: None (Room air) (10/01/21 1623)    Estimated body mass index is 26.76 kg/m² as calculated from the following:    Height as of this encounter: 5' (1.524 m). Weight as of this encounter: 62.1 kg (137 lb). Intake/Output Summary (Last 24 hours) at 10/3/2021 0951  Last data filed at 10/3/2021 0326  Gross per 24 hour   Intake 684 ml   Output 600 ml   Net 84 ml         Physical Exam:   General:    No overt distress  Head:  Normocephalic, atraumatic  Eyes:  Sclerae appear normal.  Pupils equally round. ENT:  Nares appear normal, no drainage. Moist oral mucosa  Neck:  No restricted ROM. Trachea midline   CV:   RRR. No m/r/g. Lungs: No wheezing. Respirations even, unlabored on room air. Abdomen:   Soft, nontender, nondistended. Extremities: No cyanosis or clubbing. Skin:     No rashes and normal coloration. Warm and dry. Neuro:  Cranial nerves II-XII grossly intact. A&Ox3  Psych:  Anxious.       I have reviewed ordered lab tests and independently visualized imaging below:    Last 24hr Labs:  Recent Results (from the past 24 hour(s))   GLUCOSE, POC    Collection Time: 10/02/21 12:38 PM   Result Value Ref Range    Glucose (POC) 128 (H) 65 - 100 mg/dL    Performed by Migue    GLUCOSE, POC    Collection Time: 10/02/21  4:21 PM   Result Value Ref Range    Glucose (POC) 187 (H) 65 - 100 mg/dL    Performed by RomanAlexaBSN    GLUCOSE, POC    Collection Time: 10/02/21  9:24 PM   Result Value Ref Range    Glucose (POC) 74 65 - 100 mg/dL    Performed by Mabel    CBC W/O DIFF    Collection Time: 10/03/21  4:07 AM   Result Value Ref Range    WBC 17.1 (H) 4.3 - 11.1 K/uL    RBC 5.50 (H) 4.05 - 5.2 M/uL    HGB 14.9 11.7 - 15.4 g/dL    HCT 46.3 35.8 - 46.3 %    MCV 84.2 79.6 - 97.8 FL    MCH 27.1 26.1 - 32.9 PG    MCHC 32.2 31.4 - 35.0 g/dL    RDW 15.0 (H) 11.9 - 14.6 %    PLATELET 519 088 - 946 K/uL    MPV 8.1 (L) 9.4 - 12.3 FL    ABSOLUTE NRBC 0.00 0.0 - 0.2 K/uL   METABOLIC PANEL, BASIC    Collection Time: 10/03/21  4:07 AM   Result Value Ref Range    Sodium 138 136 - 145 mmol/L    Potassium 2.9 (L) 3.5 - 5.1 mmol/L    Chloride 103 98 - 107 mmol/L    CO2 27 21 - 32 mmol/L    Anion gap 8 7 - 16 mmol/L    Glucose 177 (H) 65 - 100 mg/dL    BUN 6 (L) 8 - 23 MG/DL    Creatinine 0.43 (L) 0.6 - 1.0 MG/DL    GFR est AA >60 >60 ml/min/1.73m2    GFR est non-AA >60 >60 ml/min/1.73m2    Calcium 9.6 8.3 - 10.4 MG/DL   GLUCOSE, POC    Collection Time: 10/03/21  8:02 AM   Result Value Ref Range    Glucose (POC) 172 (H) 65 - 100 mg/dL    Performed by Migue        All Micro Results     None          Other Studies:  No results found.     Current Meds:  Current Facility-Administered Medications   Medication Dose Route Frequency    melatonin tablet 5 mg  5 mg Oral QHS    temazepam (RESTORIL) capsule 15 mg  15 mg Oral QHS PRN    potassium chloride (K-DUR, KLOR-CON) SR tablet 40 mEq  40 mEq Oral BID    dextrose 40% (GLUTOSE) oral gel 1 Tube  15 g Oral PRN    glucagon (GLUCAGEN) injection 1 mg  1 mg IntraMUSCular PRN    dextrose (D50W) injection syrg 12.5-25 g  25-50 mL IntraVENous PRN    olmesartan/hydroCHLOROthiazide (BENICAR HCT) 40/12.5 mg   Oral DAILY    metoprolol succinate (TOPROL-XL) XL tablet 50 mg  50 mg Oral DAILY    pantoprazole (PROTONIX) tablet 40 mg  40 mg Oral ACB    rosuvastatin (CRESTOR) tablet 10 mg  10 mg Oral QHS    hydrALAZINE (APRESOLINE) 20 mg/mL injection 10 mg  10 mg IntraVENous Q4H PRN    diphenhydrAMINE (BENADRYL) capsule 25 mg  25 mg Oral QHS PRN    ALPRAZolam (XANAX) tablet 0.5 mg  0.5 mg Oral TID PRN    amLODIPine (NORVASC) tablet 10 mg  10 mg Oral DAILY    aspirin delayed-release tablet 81 mg  81 mg Oral Q12H    sodium chloride (NS) flush 5-40 mL  5-40 mL IntraVENous Q8H    sodium chloride (NS) flush 5-40 mL  5-40 mL IntraVENous PRN    acetaminophen (TYLENOL) tablet 650 mg  650 mg Oral Q6H PRN    Or    acetaminophen (TYLENOL) suppository 650 mg  650 mg Rectal Q6H PRN    ondansetron (ZOFRAN) injection 4 mg  4 mg IntraVENous Q6H PRN    insulin lispro (HUMALOG) injection   SubCUTAneous AC&HS    cloNIDine HCL (CATAPRES) tablet 0.2 mg  0.2 mg Oral BID PRN    insulin lispro (HUMALOG) injection 5 Units  5 Units SubCUTAneous TIDAC    ALPRAZolam (XANAX) tablet 0.5 mg  0.5 mg Oral BID       Signed:  Xavi Willson NP    Part of this note may have been written by using a voice dictation software. The note has been proof read but may still contain some grammatical/other typographical errors.

## 2021-10-03 NOTE — PROGRESS NOTES
Problem: Falls - Risk of  Goal: *Absence of Falls  Description: Document Sanjivney Mess Fall Risk and appropriate interventions in the flowsheet.   Outcome: Progressing Towards Goal  Note: Fall Risk Interventions:       Mentation Interventions: Adequate sleep, hydration, pain control, Door open when patient unattended, Increase mobility, More frequent rounding    Medication Interventions: Evaluate medications/consider consulting pharmacy, Patient to call before getting OOB                   Problem: Anxiety  Goal: *Alleviation of anxiety  Outcome: Progressing Towards Goal

## 2021-10-03 NOTE — PROGRESS NOTES
END OF SHIFT NOTE: Pt slept after sleep aid given, asleep during shift change. INTAKE/OUTPUT  10/02 0701 - 10/03 0700  In: 684 [I.V.:684]  Out: 1300 [Urine:1300]  Voiding: YES  Catheter: NO  Drain:              Flatus: Patient does have flatus present. Stool:  0 occurrences. Characteristics:  Stool Assessment  Stool Color: Brown (orangish color)  Stool Appearance: Watery, Loose  Stool Amount: Large  Stool Source/Status: Rectum    Emesis: 0 occurrences. Characteristics:        VITAL SIGNS  Patient Vitals for the past 12 hrs:   Temp Pulse Resp BP SpO2   10/03/21 0326 98.1 °F (36.7 °C) (!) 58 18 129/71 96 %   10/02/21 2242 98.2 °F (36.8 °C) (!) 59 18 (!) 146/66 96 %   10/02/21 1953 97.8 °F (36.6 °C) 62 18 137/64 98 %       Pain Assessment  Pain Intensity 1: 0 (10/02/21 2002)  Pain Location 1: Foot  Pain Intervention(s) 1: Repositioned  Patient Stated Pain Goal: 0    Ambulating  Yes    Shift report given to oncoming nurse at the bedside.     Jaya Garcia RN

## 2021-10-03 NOTE — PROGRESS NOTES
Pt very anxious. Request something for sleep, states \"I haven't slept for days, and I will leave when I don't get anything for sleep\". Son Chris Corcoran on the phone, concerned as Pt not being herself and son does not want mother to leave. He tells pt takes trazadone 100 mg 1-2 tabs prn for sleep. However, Pt states \"that medication has never worked for [employer] Dr Reynaldo Stone notified, temazepam and melatonin ordered.

## 2021-10-03 NOTE — PROGRESS NOTES
Pt c/o \"scratchy throat\" that started this morning. No other c/o. request Benadryl. States she takes Benadryl for same occasionally at home.  Dr Sherri Dee notified, orders placed

## 2021-10-04 ENCOUNTER — HOME HEALTH ADMISSION (OUTPATIENT)
Dept: HOME HEALTH SERVICES | Facility: HOME HEALTH | Age: 66
End: 2021-10-04
Payer: MEDICARE

## 2021-10-04 VITALS
BODY MASS INDEX: 26.9 KG/M2 | HEIGHT: 60 IN | WEIGHT: 137 LBS | HEART RATE: 80 BPM | TEMPERATURE: 98.8 F | OXYGEN SATURATION: 96 % | RESPIRATION RATE: 18 BRPM | DIASTOLIC BLOOD PRESSURE: 66 MMHG | SYSTOLIC BLOOD PRESSURE: 147 MMHG

## 2021-10-04 LAB
ANION GAP SERPL CALC-SCNC: 8 MMOL/L (ref 7–16)
BUN SERPL-MCNC: 8 MG/DL (ref 8–23)
CALCIUM SERPL-MCNC: 9.9 MG/DL (ref 8.3–10.4)
CHLORIDE SERPL-SCNC: 102 MMOL/L (ref 98–107)
CO2 SERPL-SCNC: 28 MMOL/L (ref 21–32)
CREAT SERPL-MCNC: 0.47 MG/DL (ref 0.6–1)
ERYTHROCYTE [DISTWIDTH] IN BLOOD BY AUTOMATED COUNT: 14.9 % (ref 11.9–14.6)
GLUCOSE BLD STRIP.AUTO-MCNC: 165 MG/DL (ref 65–100)
GLUCOSE BLD STRIP.AUTO-MCNC: 273 MG/DL (ref 65–100)
GLUCOSE SERPL-MCNC: 158 MG/DL (ref 65–100)
HCT VFR BLD AUTO: 48 % (ref 35.8–46.3)
HGB BLD-MCNC: 15.9 G/DL (ref 11.7–15.4)
MAGNESIUM SERPL-MCNC: 1.6 MG/DL (ref 1.8–2.4)
MCH RBC QN AUTO: 27.9 PG (ref 26.1–32.9)
MCHC RBC AUTO-ENTMCNC: 33.1 G/DL (ref 31.4–35)
MCV RBC AUTO: 84.4 FL (ref 79.6–97.8)
NRBC # BLD: 0 K/UL (ref 0–0.2)
PLATELET # BLD AUTO: 312 K/UL (ref 150–450)
PMV BLD AUTO: 8.1 FL (ref 9.4–12.3)
POTASSIUM SERPL-SCNC: 3.4 MMOL/L (ref 3.5–5.1)
RBC # BLD AUTO: 5.69 M/UL (ref 4.05–5.2)
SERVICE CMNT-IMP: ABNORMAL
SERVICE CMNT-IMP: ABNORMAL
SODIUM SERPL-SCNC: 138 MMOL/L (ref 136–145)
WBC # BLD AUTO: 16.9 K/UL (ref 4.3–11.1)

## 2021-10-04 PROCEDURE — 74011250637 HC RX REV CODE- 250/637: Performed by: FAMILY MEDICINE

## 2021-10-04 PROCEDURE — 90471 IMMUNIZATION ADMIN: CPT

## 2021-10-04 PROCEDURE — 80048 BASIC METABOLIC PNL TOTAL CA: CPT

## 2021-10-04 PROCEDURE — 90686 IIV4 VACC NO PRSV 0.5 ML IM: CPT | Performed by: INTERNAL MEDICINE

## 2021-10-04 PROCEDURE — 74011250636 HC RX REV CODE- 250/636: Performed by: NURSE PRACTITIONER

## 2021-10-04 PROCEDURE — 74011250637 HC RX REV CODE- 250/637: Performed by: HOSPITALIST

## 2021-10-04 PROCEDURE — 2709999900 HC NON-CHARGEABLE SUPPLY

## 2021-10-04 PROCEDURE — 85027 COMPLETE CBC AUTOMATED: CPT

## 2021-10-04 PROCEDURE — 83735 ASSAY OF MAGNESIUM: CPT

## 2021-10-04 PROCEDURE — 74011250637 HC RX REV CODE- 250/637: Performed by: NURSE PRACTITIONER

## 2021-10-04 PROCEDURE — 82962 GLUCOSE BLOOD TEST: CPT

## 2021-10-04 PROCEDURE — 36415 COLL VENOUS BLD VENIPUNCTURE: CPT

## 2021-10-04 PROCEDURE — 74011250636 HC RX REV CODE- 250/636: Performed by: INTERNAL MEDICINE

## 2021-10-04 PROCEDURE — 74011636637 HC RX REV CODE- 636/637: Performed by: HOSPITALIST

## 2021-10-04 RX ORDER — POTASSIUM CHLORIDE 20 MEQ/1
40 TABLET, EXTENDED RELEASE ORAL DAILY
Qty: 6 TABLET | Refills: 0 | Status: SHIPPED | OUTPATIENT
Start: 2021-10-04 | End: 2021-10-07

## 2021-10-04 RX ORDER — CALCIUM CARBONATE 200(500)MG
200 TABLET,CHEWABLE ORAL
Status: DISCONTINUED | OUTPATIENT
Start: 2021-10-04 | End: 2021-10-04 | Stop reason: HOSPADM

## 2021-10-04 RX ORDER — ALPRAZOLAM 1 MG/1
0.5 TABLET ORAL
Qty: 90 TABLET | Refills: 2 | Status: SHIPPED
Start: 2021-10-04 | End: 2021-11-04 | Stop reason: DRUGHIGH

## 2021-10-04 RX ADMIN — INSULIN LISPRO 5 UNITS: 100 INJECTION, SOLUTION INTRAVENOUS; SUBCUTANEOUS at 08:02

## 2021-10-04 RX ADMIN — METOPROLOL SUCCINATE 50 MG: 50 TABLET, EXTENDED RELEASE ORAL at 08:14

## 2021-10-04 RX ADMIN — ALPRAZOLAM 0.5 MG: 0.5 TABLET ORAL at 08:07

## 2021-10-04 RX ADMIN — ENOXAPARIN SODIUM 40 MG: 40 INJECTION SUBCUTANEOUS at 10:31

## 2021-10-04 RX ADMIN — Medication 10 ML: at 05:03

## 2021-10-04 RX ADMIN — ASPIRIN 81 MG: 81 TABLET ORAL at 08:08

## 2021-10-04 RX ADMIN — Medication 10 ML: at 13:42

## 2021-10-04 RX ADMIN — INSULIN LISPRO 6 UNITS: 100 INJECTION, SOLUTION INTRAVENOUS; SUBCUTANEOUS at 12:02

## 2021-10-04 RX ADMIN — INSULIN LISPRO 2 UNITS: 100 INJECTION, SOLUTION INTRAVENOUS; SUBCUTANEOUS at 08:04

## 2021-10-04 RX ADMIN — AMLODIPINE BESYLATE 10 MG: 10 TABLET ORAL at 08:08

## 2021-10-04 RX ADMIN — CALCIUM CARBONATE (ANTACID) CHEW TAB 500 MG 200 MG: 500 CHEW TAB at 00:43

## 2021-10-04 RX ADMIN — HYDROCHLOROTHIAZIDE: 12.5 CAPSULE ORAL at 08:13

## 2021-10-04 RX ADMIN — INSULIN LISPRO 5 UNITS: 100 INJECTION, SOLUTION INTRAVENOUS; SUBCUTANEOUS at 12:01

## 2021-10-04 RX ADMIN — PANTOPRAZOLE SODIUM 40 MG: 40 TABLET, DELAYED RELEASE ORAL at 05:04

## 2021-10-04 RX ADMIN — POTASSIUM CHLORIDE 40 MEQ: 20 TABLET, EXTENDED RELEASE ORAL at 08:06

## 2021-10-04 RX ADMIN — INFLUENZA VIRUS VACCINE 0.5 ML: 15; 15; 15; 15 SUSPENSION INTRAMUSCULAR at 13:16

## 2021-10-04 NOTE — ROUTINE PROCESS
Bedside and Verbal shift change report given to self (oncoming nurse) by Felizardo Essex, RN (offgoing nurse). Report included the following information SBAR, Kardex, ED Summary, Procedure Summary, Intake/Output, MAR and Recent Results.

## 2021-10-04 NOTE — PROGRESS NOTES
CM met with patient to discuss discharge needs and plan. Patient alert/orient x4. Patient verified demographic patient requested that her son's be added as emergency contact. Information updated. Verified PCP and insurance. States she use [facility] on [address]. Patient states she lives in a one level home with 2 steps to enter into the home. States she's independent with her ADL's and do has DME's (RW/BSC/ grab bars) in the home. States that she do has family that lives nearby. Patient has discharge orders for home today. Patient has declined HH. Patient has no needs identified at this time. Family will transport patient home. Patient has met all treatment goals / milestones. CM will continue to monitor and remain available for any needs that may occur. 1505-Received a call back from the family requesting [place]. Family was informed that patient declined [place]. Son States that patient need HH. Requested a referral be done. Home Health referral made to [facility]. Referral completed. Care Management Interventions  PCP Verified by CM: Yes (Rao Jane MD)  Mode of Transport at Discharge:  Other (see comment)  Transition of Care Consult (CM Consult): Discharge Planning, Home Health (requesting a referral to be made to [facility])  [address]: Yes  Discharge Durable Medical Equipment: No  Physical Therapy Consult: No  Occupational Therapy Consult: No  Speech Therapy Consult: No  Support Systems: Other (Comment) (patient lives alone)  Confirm Follow Up Transport: Family  The Patient and/or Patient Representative was Provided with a Choice of Provider and Agrees with the Discharge Plan?: No  Freedom of Choice List was Provided with Basic Dialogue that Supports the Patient's Individualized Plan of Care/Goals, Treatment Preferences and Shares the Quality Data Associated with the Providers?: No  [place] Resource Information Provided?: No  Discharge Location  Discharge Placement: Home with home [facility] will be active with patient )

## 2021-10-04 NOTE — DISCHARGE SUMMARY
Hospitalist Discharge Summary   Admit Date:  10/1/2021  4:25 PM   DC Note date: 10/4/2021  Name:  Pardeep Arciniega   Age:  78 y.o. Sex:  female  :  1955   MRN:  [id number]   Room:  221  PCP:  Jadon Obrien MD    Presenting Complaint: Altered mental status    Initial Admission Diagnosis: Acute encephalopathy [G93.40]     Problem List for this Hospitalization:  Hospital Problems as of 10/4/2021 Date Reviewed: 2021        Codes Class Noted - Resolved POA    * (Principal) Acute encephalopathy ICD-10-CM: G93.40  ICD-9-CM: 348.30  10/1/2021 - Present Unknown            Did Patient have Sepsis (YES OR NO): no      Hospital Course:  Ms. Yandel Rodriguez a 79 y/o F w/ hx of bipolar, HTN, DM, and colon ca who presented with confusion.  She was discharged from another hospital on  after being treated for altered mental status and dehydration.  On  her PCP was concerned with continued confusion and advised that she come here for re-evaluation.  She stated that she has not taken her alprazolam in 1 week but had been chronically taking 1 mg 3 times daily.  She was admitted for acute encephalopathy likely secondary to benzodiazepine withdrawal.    Her xanax was re-started at a lower dose. 0.5mg tid prn. Her mentation improved. She is stable for dc to home. Assessment & Plan:      Acute encephalopathy (10/1/2021)  - likely 2/2 benzo withdrawal  - she was started on alprazolam 0.5 mg tid prn here  - ammonia 17     Leukocytosis  - LA, PCT wnl  - Afebrile  - Trend and monitor  - it has been high for years per her. No signs of active infection  -would suggest outpatient referral to hematology at the pcps discretion.     Acute hypokalemia  Repeat potassium in 1 week     Hypertension  - Resume home medications       DM Type 2  - A1c 7.4 on Aug/04  - continue home tresiba  - further mgt per pcp.       GERD  - Cont home PPI     Dyslipidemia  - Cont home statin    Chronic pain  Pt states she has enough of her medications and will f/up with her pain mgt doctors on discharge.            Disposition: Home or Self Care  Diet: ADULT DIET Regular; 3 carb choices (45 gm/meal)  ADULT ORAL NUTRITION SUPPLEMENT Breakfast; Diabetic Supplement  Code Status: Full Code    Follow Up Orders: Follow-up Appointments   Procedures    FOLLOW UP VISIT Appointment in: One Week     Standing Status:   Standing     Number of Occurrences:   1     Order Specific Question:   Appointment in     Answer: One Week       Follow-up Information     Follow up With Specialties Details Why Contact Info    Phil Pedro MD Family Medicine On 10/7/2021 11:00 [address] [place] [address]  [phone]            Suggested initial follow up labs/diagnostics (ultimately defer to outpatient provider):  Repeat potassium in 1 week    Time spent in patient discharge and coordination 38 minutes minutes. Plan was discussed with the patient and the IDT. All questions answered. Patient was stable at time of discharge. Instructions given to call a physician or return if any concerns. Discharge Info:   Current Discharge Medication List      CONTINUE these medications which have CHANGED    Details   ALPRAZolam (XANAX) 1 mg tablet Take 0.5 Tablets by mouth three (3) times daily as needed for Anxiety.  Max Daily Amount: 1.5 mg. TAKE ONE TABLET BY MOUTH THREE TIMES A DAY AS NEEDED FOR ANXIETY  Qty: 90 Tablet, Refills: 2  Start date: 10/4/2021    Associated Diagnoses: Malaise         CONTINUE these medications which have NOT CHANGED    Details   cyclobenzaprine (FLEXERIL) 10 mg tablet TAKE ONE TABLET BY MOUTH THREE TIMES A DAY AS NEEDED FOR MUSCLE SPASMS  Qty: 90 Tablet, Refills: 3    Associated Diagnoses: Back muscle spasm      albuterol (PROVENTIL HFA, VENTOLIN HFA, PROAIR HFA) 90 mcg/actuation inhaler INHALE TWO PUFFS BY MOUTH EVERY 6 HOURS AS NEEDED FOR WHEEZING  Qty: 8.5 g, Refills: 3    Associated Diagnoses: Wheeze      rosuvastatin (CRESTOR) 10 mg tablet TAKE ONE TABLET BY MOUTH NIGHTLY  Qty: 30 Tablet, Refills: 5    Associated Diagnoses: Mixed hyperlipidemia      !! morphine IR (MS IR) 30 mg tablet Take 30 mg by mouth every twelve (12) hours every twelve (12) hours. !! morphine IR (MS IR) 15 mg tablet Take 15 mg by mouth every six (6) hours as needed for Pain. mupirocin calcium (BACTROBAN) 2 % nasal ointment by Both Nostrils route two (2) times a day. Qty: 1 g, Refills: 5    Associated Diagnoses: Nasal congestion      traZODone (DESYREL) 100 mg tablet TAKE 2-3 TABLETS BY MOUTH AT BEDTIME  Qty: 90 Tablet, Refills: 5    Associated Diagnoses: Insomnia, unspecified type      metoprolol succinate (TOPROL-XL) 50 mg XL tablet TAKE 1 TABLET BY MOUTH EVERY DAY  Qty: 90 Tab, Refills: 1    Associated Diagnoses: Essential hypertension with goal blood pressure less than 140/90      olmesartan (BENICAR) 40 mg tablet 1 p.o. daily for blood pressure instead of olmesartan HCTZ  Qty: 90 Tab, Refills: 1    Associated Diagnoses: Essential hypertension      insulin degludec Hollie Hilts FlexTouch U-200) 200 unit/mL (3 mL) inpn pen 50 units sq daily ( 18 pens for a 90 day supply)  Indications: type 2 diabetes mellitus  Qty: 45 mL, Refills: 5    Associated Diagnoses: Type 2 diabetes with nephropathy (HCC)      promethazine (PHENERGAN) 25 mg tablet Take 1 Tab by mouth every six (6) hours as needed for Nausea. Qty: 60 Tab, Refills: 2    Associated Diagnoses: Gastroesophageal reflux disease      hydroCHLOROthiazide (HYDRODIURIL) 12.5 mg tablet TAKE 1 TABLET BY MOUTH EVERY DAY FOR BLOOD PRESSURE WITH THE OLMESARTAN  Qty: 90 Tab, Refills: 1    Associated Diagnoses: Essential hypertension with goal blood pressure less than 140/90      levocetirizine (XYZAL) 5 mg tablet Take 1 Tab by mouth nightly.   Qty: 90 Tab, Refills: 3    Associated Diagnoses: Chronic nonseasonal allergic rhinitis due to pollen      atomoxetine (Strattera) 60 mg capsule 1 po qam instead of the  25 mg dose.  Qty: 30 Cap, Refills: 5    Associated Diagnoses: Attention and concentration deficit      Walker (Ultra-Light Rollator) misc Used daily for ambulation - Dx: gait instability, peripheral neuropathy, DJD knee  Qty: 1 Each, Refills: 0    Associated Diagnoses: Fibromyalgia; DDD (degenerative disc disease), cervical; DDD (degenerative disc disease), lumbar; Bilateral sciatica; Gait instability; Impaired mobility and ADLs; Chronic obstructive pulmonary disease, unspecified COPD type ([facility])      Narcan 4 mg/actuation nasal spray       mupirocin (BACTROBAN) 2 % ointment by Both Nostrils route two (2) times a day. Qty: 22 g, Refills: 2      amLODIPine (NORVASC) 5 mg tablet Take 1 Tab by mouth daily. ( instead of tribenzor with benicar hct)  Qty: 90 Tab, Refills: 5    Associated Diagnoses: Essential hypertension      pantoprazole (PROTONIX) 40 mg tablet Take 1 Tab by mouth daily. For stomach - instead of prilosec( failed priolosec and nexium and prevacid)  Qty: 90 Tab, Refills: 3    Associated Diagnoses: Esophagitis      montelukast (Singulair) 10 mg tablet Take 1 Tab by mouth daily. For allergies and asthma  Qty: 90 Tab, Refills: 3    Associated Diagnoses: Chronic nonseasonal allergic rhinitis due to pollen      naloxegoL (MOVANTIK) 12.5 mg tab tablet Take  by mouth Daily (before breakfast). vitamin D3-folic acid 9,655 unit- 1 mg tab Take  by mouth. aspirin delayed-release 81 mg tablet Take 1 Tab by mouth every twelve (12) hours every twelve (12) hours. Qty: 60 Tab, Refills: 0       !! - Potential duplicate medications found. Please discuss with provider. Procedures done this admission:  * No surgery found *    Consults this admission:  None    Echocardiogram/EKG results:  No results found for this or any previous visit.        EKG Results     Procedure 720 Value Units Date/Time    EKG, 12 LEAD, INITIAL [[id number]] Collected: 10/01/21 1708    Order Status: Completed Updated: 10/02/21 9841 Ventricular Rate 68 BPM      Atrial Rate 68 BPM      P-R Interval 118 ms      QRS Duration 120 ms      Q-T Interval 392 ms      QTC Calculation (Bezet) 416 ms      Calculated P Axis 32 degrees      Calculated R Axis 71 degrees      Calculated T Axis 19 degrees      Diagnosis --     Normal sinus rhythm  Right bundle branch block  When compared with ECG of 29-AUG-2015 22:39,  Premature atrial complexes are no longer Present  Right bundle branch block is now Present  Confirmed by Audra Koyanagi MD, [facility] ([id number]) on 10/2/2021 8:04:52 AM            Diagnostic Imaging/Tests:   XR CHEST PA LAT    Result Date: 10/1/2021  History: ams, r/o pna Two views chest COMPARISON: 4/13/2020 Findings: The lungs are well expanded and clear. The cardiac silhouette, and mediastinal contour, and osseous structures are stable. Stable two-view chest.     MRI [facility] SPINE WO CONT    Result Date: 7/27/2021  MR OF THE THORACIC SPINE WITHOUT CONTRAST. Clinical Indication: Moderate to severe mid back pain for three months Procedure: Multiplanar and multisequence MR images were performed of the thoracic spine without gadolinium contrast. Comparison: No prior Findings: Dextroscoliosis is present. The vertebral bodies are normal in height and signal. There is no compression fracture. No vertebral anomaly noted. There is mild multilevel degenerative disc signal changes without significant disc space height loss. The thoracic spinal cord is normal in signal throughout. There is no spinal cord compression. The posterior elements are unremarkable. No prevertebral or posterior soft tissue edema. Axial images: There is no central spinal stenosis or cord compression. Limited evaluation the paraspinal soft tissues is unremarkable. 1. Thoracolumbar scoliosis without acute musculoskeletal abnormality or vertebral anomaly. 2. Mild degenerative disc changes without central spinal stenosis or cord compression. The spinal cord is normal in signal throughout. CT HEAD WO CONT    Result Date: 10/1/2021  CT HEAD WITHOUT CONTRAST. INDICATION: Altered mental status and intermittent confusion. COMPARISON: July 2014  TECHNIQUE:   5 mm axial scans from the skull base to the vertex. Our CT scanners use one or more of the following:  Automated exposure control, adjustment of the mA and or kV according to patient size, iterative reconstruction. FINDINGS:  No acute intraparenchymal hemorrhage or abnormal extra-axial fluid collection. The ventricles are normal size. No midline shift or mass effect. Posterior fossa: Unremarkable. Included portion of the: Paranasal sinuses and mastoid air cells are clear. Globes and orbits grossly unremarkable. Negative for acute intracranial abnormality. All Micro Results     None          Labs: Results:       BMP, Mg, Phos Recent Labs     10/04/21  0440 10/03/21  0407 10/01/21  1627    138 139   K 3.4* 2.9* 3.8    103 104   CO2 28 27 30   AGAP 8 8 5*   BUN 8 6* 10   CREA 0.47* 0.43* 0.57*   CA 9.9 9.6 9.5   * 177* 68      CBC Recent Labs     10/04/21  0440 10/03/21  0407 10/02/21  0443 10/01/21  1627 10/01/21  1627   WBC 16.9* 17.1* 15.5*   < > 18.1*   RBC 5.69* 5.50* 5.37*   < > 5.58*   HGB 15.9* 14.9 14.9   < > 15.6*   HCT 48.0* 46.3 47.2*   < > 48.5*    293 304   < > 343   GRANS  --   --   --   --  52   LYMPH  --   --   --   --  39   EOS  --   --   --   --  2   MONOS  --   --   --   --  6   BASOS  --   --   --   --  1   IG  --   --   --   --  0   ANEU  --   --   --   --  9.3*   ABL  --   --   --   --  7.1*   BELLA  --   --   --   --  0.4   ABM  --   --   --   --  1.1   ABB  --   --   --   --  0.2   AIG  --   --   --   --  0.0    < > = values in this interval not displayed.       LFT Recent Labs     10/01/21  1627   ALT 50   AP 93   TP 7.5   ALB 3.6   GLOB 3.9*   AGRAT 0.9*      Cardiac Testing Lab Results   Component Value Date/Time    Troponin-I, Qt. <0.02 (L) 07/11/2014 11:45 PM      Coagulation Tests Lab Results   Component Value Date/Time    Prothrombin time 13.0 01/16/2019 02:32 PM    INR 1.0 01/16/2019 02:32 PM    aPTT 37.0 01/16/2019 02:32 PM      A1c Lab Results   Component Value Date/Time    Hemoglobin A1c 7.4 (H) 08/04/2021 12:14 PM    Hemoglobin A1c 8.0 (H) 05/04/2021 10:58 AM    Hemoglobin A1c 7.5 (H) 01/18/2021 12:57 PM      Lipid Panel Lab Results   Component Value Date/Time    Cholesterol, total 116 05/04/2021 10:58 AM    HDL Cholesterol 38 (L) 05/04/2021 10:58 AM    LDL, calculated 55 05/04/2021 10:58 AM    LDL, calculated 102 (H) 01/24/2020 11:26 AM    VLDL, calculated 23 05/04/2021 10:58 AM    VLDL, calculated 45 (H) 01/24/2020 11:26 AM    Triglyceride 133 05/04/2021 10:58 AM      Thyroid Panel Lab Results   Component Value Date/Time    TSH 2.290 10/01/2021 04:27 PM    TSH 0.882 01/18/2021 12:57 PM        Most Recent UA Lab Results   Component Value Date/Time    Color YELLOW 01/16/2019 02:32 PM    Appearance CLEAR 01/16/2019 02:32 PM    Specific gravity 1.008 01/16/2019 02:32 PM    pH (UA) 6.5 01/16/2019 02:32 PM    Protein 30 (A) 01/16/2019 02:32 PM    Glucose NEGATIVE  01/16/2019 02:32 PM    Ketone NEGATIVE  01/16/2019 02:32 PM    Bilirubin NEGATIVE  01/16/2019 02:32 PM    Blood TRACE (A) 01/16/2019 02:32 PM    Urobilinogen 1.0 01/16/2019 02:32 PM    Nitrites NEGATIVE  01/16/2019 02:32 PM    Leukocyte Esterase MODERATE (A) 01/16/2019 02:32 PM    WBC 3-5 10/01/2021 05:14 PM    RBC 3-5 10/01/2021 05:14 PM    Epithelial cells 5-10 10/01/2021 05:14 PM    Bacteria 0 10/01/2021 05:14 PM    Casts 3-5 10/01/2021 05:14 PM    Crystals, urine 0 04/13/2020 07:59 PM    Mucus 0 04/13/2020 07:59 PM    Other observations RESULTS VERIFIED MANUALLY 04/13/2020 07:59 PM          All Labs from Last 24 Hrs:  Recent Results (from the past 24 hour(s))   GLUCOSE, POC    Collection Time: 10/03/21 12:13 PM   Result Value Ref Range    Glucose (POC) 165 (H) 65 - 100 mg/dL    Performed by [facility]    GLUCOSE, POC Collection Time: 10/03/21  4:17 PM   Result Value Ref Range    Glucose (POC) 166 (H) 65 - 100 mg/dL    Performed by [facility]    GLUCOSE, POC    Collection Time: 10/03/21  8:25 PM   Result Value Ref Range    Glucose (POC) 162 (H) 65 - 100 mg/dL    Performed by Jailene    CBC W/O DIFF    Collection Time: 10/04/21  4:40 AM   Result Value Ref Range    WBC 16.9 (H) 4.3 - 11.1 K/uL    RBC 5.69 (H) 4.05 - 5.2 M/uL    HGB 15.9 (H) 11.7 - 15.4 g/dL    HCT 48.0 (H) 35.8 - 46.3 %    MCV 84.4 79.6 - 97.8 FL    MCH 27.9 26.1 - 32.9 PG    MCHC 33.1 31.4 - 35.0 g/dL    RDW 14.9 (H) 11.9 - 14.6 %    PLATELET 692 007 - 166 K/uL    MPV 8.1 (L) 9.4 - 12.3 FL    ABSOLUTE NRBC 0.00 0.0 - 0.2 K/uL   METABOLIC PANEL, BASIC    Collection Time: 10/04/21  4:40 AM   Result Value Ref Range    Sodium 138 136 - 145 mmol/L    Potassium 3.4 (L) 3.5 - 5.1 mmol/L    Chloride 102 98 - 107 mmol/L    CO2 28 21 - 32 mmol/L    Anion gap 8 7 - 16 mmol/L    Glucose 158 (H) 65 - 100 mg/dL    BUN 8 8 - 23 MG/DL    Creatinine 0.47 (L) 0.6 - 1.0 MG/DL    GFR est AA >60 >60 ml/min/1.73m2    GFR est non-AA >60 >60 ml/min/1.73m2    Calcium 9.9 8.3 - 10.4 MG/DL   GLUCOSE, POC    Collection Time: 10/04/21  7:27 AM   Result Value Ref Range    Glucose (POC) 165 (H) 65 - 100 mg/dL    Performed by Joseph        Current Med List in Hospital:   Current Facility-Administered Medications   Medication Dose Route Frequency    calcium carbonate (TUMS) chewable tablet 200 mg [elemental]  200 mg Oral TID PRN    melatonin tablet 5 mg  5 mg Oral QHS    temazepam (RESTORIL) capsule 15 mg  15 mg Oral QHS PRN    potassium chloride (K-DUR, KLOR-CON) SR tablet 40 mEq  40 mEq Oral BID    enoxaparin (LOVENOX) injection 40 mg  40 mg SubCUTAneous Q24H    montelukast (SINGULAIR) tablet 10 mg  10 mg Oral QHS    cetirizine (ZYRTEC) tablet 5 mg  5 mg Oral QPM    dextrose 40% (GLUTOSE) oral gel 1 Tube  15 g Oral PRN    glucagon (GLUCAGEN) injection 1 mg  1 mg IntraMUSCular PRN    dextrose (D50W) injection syrg 12.5-25 g  25-50 mL IntraVENous PRN    olmesartan/hydroCHLOROthiazide (BENICAR HCT) 40/12.5 mg   Oral DAILY    metoprolol succinate (TOPROL-XL) XL tablet 50 mg  50 mg Oral DAILY    pantoprazole (PROTONIX) tablet 40 mg  40 mg Oral ACB    rosuvastatin (CRESTOR) tablet 10 mg  10 mg Oral QHS    hydrALAZINE (APRESOLINE) 20 mg/mL injection 10 mg  10 mg IntraVENous Q4H PRN    diphenhydrAMINE (BENADRYL) capsule 25 mg  25 mg Oral QHS PRN    ALPRAZolam (XANAX) tablet 0.5 mg  0.5 mg Oral TID PRN    amLODIPine (NORVASC) tablet 10 mg  10 mg Oral DAILY    aspirin delayed-release tablet 81 mg  81 mg Oral Q12H    sodium chloride (NS) flush 5-40 mL  5-40 mL IntraVENous Q8H    sodium chloride (NS) flush 5-40 mL  5-40 mL IntraVENous PRN    acetaminophen (TYLENOL) tablet 650 mg  650 mg Oral Q6H PRN    Or    acetaminophen (TYLENOL) suppository 650 mg  650 mg Rectal Q6H PRN    ondansetron (ZOFRAN) injection 4 mg  4 mg IntraVENous Q6H PRN    insulin lispro (HUMALOG) injection   SubCUTAneous AC&HS    insulin lispro (HUMALOG) injection 5 Units  5 Units SubCUTAneous TIDAC    ALPRAZolam (XANAX) tablet 0.5 mg  0.5 mg Oral BID       Allergies   Allergen Reactions    Latex Rash     Skin raw    Adhesive Other (comments)     Bruising, burning    Arthrotec 50 [Diclofenac-Misoprostol] Nausea Only    Chantix [Varenicline] Other (comments)     \"I had violent outbursts\"    Gabapentin Palpitations     Other reaction(s): Other- (not listed) - Allergy  Upset stomach     Nsaids (Non-Steroidal Anti-Inflammatory Drug) Other (comments)     Not to take due to kidneys blood and pus in urine. Other reaction(s):  Other- (not listed) - Allergy  NA    Requip [Ropinirole] Other (comments)     Keep pt awake    Tylenol [Acetaminophen] Other (comments)     Bothers her liver or kidneys     Immunization History   Administered Date(s) Administered    Covid-19, MODERNA, Mrna, Lnp-s, Pf, 100mcg/0.5mL 02/19/2021, 03/19/2021    Influenza High Dose Vaccine PF 09/25/2019, 09/15/2020    Influenza Vaccine 12/12/2010    Influenza, Quadrivalent, Adjuvanted (>65 Yrs FLUAD QUAD [id number]) 09/11/2020    Pneumococcal Conjugate (PCV-13) 09/22/2020    Pneumococcal Polysaccharide (PPSV-23) 03/19/2019, 09/22/2020    TB Skin Test (PPD) Intradermal 01/24/2019    Tdap 10/16/2017    Zoster Recombinant 04/17/2018, 07/24/2018       Recent Vital Data:  Patient Vitals for the past 24 hrs:   Temp Pulse Resp BP SpO2   10/04/21 0749 98.5 °F (36.9 °C) 73 18 (!) 161/72 96 %   10/04/21 0701 98.2 °F (36.8 °C) 76 16 (!) 150/52 95 %   10/04/21 0314 98 °F (36.7 °C) 63 17 (!) 144/56 95 %   10/03/21 2335 98.3 °F (36.8 °C) 66 17 (!) 156/66 98 %   10/03/21 1936 98 °F (36.7 °C) 61 17 (!) 168/77 98 %   10/03/21 1550 98.5 °F (36.9 °C) 77 18 (!) 143/61 97 %   10/03/21 1145 98 °F (36.7 °C) 71 18 (!) 172/82 95 %     Oxygen Therapy  O2 Sat (%): 96 % (10/04/21 0749)  Pulse via Oximetry: 71 beats per minute (10/01/21 1949)  O2 Device: None (Room air) (10/04/21 0831)    Estimated body mass index is 26.76 kg/m² as calculated from the following:    Height as of this encounter: 5' (1.524 m). Weight as of this encounter: 62.1 kg (137 lb). Intake/Output Summary (Last 24 hours) at 10/4/2021 1120  Last data filed at 10/4/2021 0939  Gross per 24 hour   Intake 360 ml   Output 500 ml   Net -140 ml         Physical Exam:    General:    Well nourished. No overt distress  Head:  Normocephalic, atraumatic  Eyes:  Sclerae appear normal.  Pupils equally round. HENT:  Nares appear normal, no drainage. Moist mucous membranes  Neck:  No restricted ROM. Trachea midline  CV:   RRR. No m/r/g. No JVD  Lungs:   CTAB. No wheezing, rhonchi, or rales. Even, unlabored  Abdomen:   Soft, nontender, nondistended. Extremities: Warm and dry. No cyanosis or clubbing. No edema. Skin:     No rashes.   Normal coloration  Neuro:  Cranial nerves II-XII grossly intact. Psych:  Normal mood and affect. Signed:  Tommy Quesada MD    Part of this note may have been written by using a voice dictation software. The note has been proof read but may still contain some grammatical/other typographical errors.

## 2021-10-04 NOTE — PROGRESS NOTES
END OF SHIFT NOTE:    INTAKE/OUTPUT  10/03 0701 - 10/04 0700  In: -   Out: 500 [Urine:500]  Voiding: YES  Catheter: NO  Drain:              Flatus: Patient does have flatus present. Stool:  1 occurrences. Characteristics:  Stool Assessment  Stool Color: Brown  Stool Appearance: Formed, Loose (very small formed parts per pt)  Stool Amount: Small  Stool Source/Status: Rectum    Emesis: 0 occurrences. Characteristics:        VITAL SIGNS  Patient Vitals for the past 12 hrs:   Temp Pulse Resp BP SpO2   10/04/21 0314 98 °F (36.7 °C) 63 17 (!) 144/56 95 %   10/03/21 2335 98.3 °F (36.8 °C) 66 17 (!) 156/66 98 %   10/03/21 1936 98 °F (36.7 °C) 61 17 (!) 168/77 98 %       Pain Assessment  Pain Intensity 1: 0 (denies) (10/03/21 1320)  Pain Location 1: Foot  Pain Intervention(s) 1: Repositioned  Patient Stated Pain Goal: 0    Ambulating  Yes    Shift report given to oncoming nurse at the bedside.     Mahesh Rossi, RN

## 2021-10-04 NOTE — PROGRESS NOTES
Discharge instructions reviewed with pt. Prescription for Potassium available for pickup and med info sheets provided for all new medications. Opportunity for questions provided. Pt voiced understanding of all discharge instructions. IV removed.

## 2021-10-05 ENCOUNTER — HOME CARE VISIT (OUTPATIENT)
Dept: SCHEDULING | Facility: HOME HEALTH | Age: 66
End: 2021-10-05
Payer: MEDICARE

## 2021-10-05 VITALS
SYSTOLIC BLOOD PRESSURE: 100 MMHG | OXYGEN SATURATION: 94 % | RESPIRATION RATE: 16 BRPM | DIASTOLIC BLOOD PRESSURE: 55 MMHG | TEMPERATURE: 97.3 F | HEART RATE: 88 BPM

## 2021-10-05 PROCEDURE — G0151 HHCP-SERV OF PT,EA 15 MIN: HCPCS

## 2021-10-05 PROCEDURE — 400013 HH SOC

## 2021-10-07 ENCOUNTER — HOME CARE VISIT (OUTPATIENT)
Dept: SCHEDULING | Facility: HOME HEALTH | Age: 66
End: 2021-10-07
Payer: MEDICARE

## 2021-10-07 VITALS
DIASTOLIC BLOOD PRESSURE: 60 MMHG | SYSTOLIC BLOOD PRESSURE: 110 MMHG | HEART RATE: 78 BPM | RESPIRATION RATE: 18 BRPM | TEMPERATURE: 99.1 F | OXYGEN SATURATION: 96 %

## 2021-10-07 PROCEDURE — G0157 HHC PT ASSISTANT EA 15: HCPCS

## 2021-10-11 ENCOUNTER — HOME CARE VISIT (OUTPATIENT)
Dept: SCHEDULING | Facility: HOME HEALTH | Age: 66
End: 2021-10-11
Payer: MEDICARE

## 2021-10-11 PROCEDURE — G0299 HHS/HOSPICE OF RN EA 15 MIN: HCPCS

## 2021-10-12 ENCOUNTER — HOME CARE VISIT (OUTPATIENT)
Dept: SCHEDULING | Facility: HOME HEALTH | Age: 66
End: 2021-10-12
Payer: MEDICARE

## 2021-10-12 VITALS
HEART RATE: 74 BPM | DIASTOLIC BLOOD PRESSURE: 60 MMHG | RESPIRATION RATE: 18 BRPM | TEMPERATURE: 98.7 F | SYSTOLIC BLOOD PRESSURE: 120 MMHG | OXYGEN SATURATION: 96 %

## 2021-10-12 VITALS
TEMPERATURE: 97.2 F | DIASTOLIC BLOOD PRESSURE: 60 MMHG | RESPIRATION RATE: 12 BRPM | SYSTOLIC BLOOD PRESSURE: 122 MMHG | HEART RATE: 95 BPM | OXYGEN SATURATION: 93 %

## 2021-10-12 PROCEDURE — G0157 HHC PT ASSISTANT EA 15: HCPCS

## 2021-10-14 ENCOUNTER — HOME CARE VISIT (OUTPATIENT)
Dept: SCHEDULING | Facility: HOME HEALTH | Age: 66
End: 2021-10-14
Payer: MEDICARE

## 2021-10-14 VITALS
TEMPERATURE: 97.7 F | RESPIRATION RATE: 16 BRPM | OXYGEN SATURATION: 94 % | HEART RATE: 72 BPM | DIASTOLIC BLOOD PRESSURE: 62 MMHG | SYSTOLIC BLOOD PRESSURE: 120 MMHG

## 2021-10-14 VITALS
DIASTOLIC BLOOD PRESSURE: 64 MMHG | SYSTOLIC BLOOD PRESSURE: 130 MMHG | HEART RATE: 79 BPM | TEMPERATURE: 97.3 F | RESPIRATION RATE: 12 BRPM | OXYGEN SATURATION: 93 %

## 2021-10-14 VITALS
SYSTOLIC BLOOD PRESSURE: 130 MMHG | TEMPERATURE: 97.3 F | DIASTOLIC BLOOD PRESSURE: 64 MMHG | RESPIRATION RATE: 17 BRPM | OXYGEN SATURATION: 93 % | HEART RATE: 79 BPM

## 2021-10-14 PROCEDURE — G0153 HHCP-SVS OF S/L PATH,EA 15MN: HCPCS

## 2021-10-14 PROCEDURE — G0299 HHS/HOSPICE OF RN EA 15 MIN: HCPCS

## 2021-10-14 PROCEDURE — G0157 HHC PT ASSISTANT EA 15: HCPCS

## 2021-10-18 ENCOUNTER — HOME CARE VISIT (OUTPATIENT)
Dept: SCHEDULING | Facility: HOME HEALTH | Age: 66
End: 2021-10-18
Payer: MEDICARE

## 2021-10-18 VITALS
RESPIRATION RATE: 16 BRPM | HEART RATE: 72 BPM | DIASTOLIC BLOOD PRESSURE: 74 MMHG | OXYGEN SATURATION: 97 % | SYSTOLIC BLOOD PRESSURE: 122 MMHG | TEMPERATURE: 97.9 F

## 2021-10-18 PROCEDURE — G0153 HHCP-SVS OF S/L PATH,EA 15MN: HCPCS

## 2021-10-19 ENCOUNTER — HOME CARE VISIT (OUTPATIENT)
Dept: SCHEDULING | Facility: HOME HEALTH | Age: 66
End: 2021-10-19
Payer: MEDICARE

## 2021-10-19 VITALS
DIASTOLIC BLOOD PRESSURE: 84 MMHG | HEART RATE: 76 BPM | SYSTOLIC BLOOD PRESSURE: 152 MMHG | TEMPERATURE: 98.5 F | RESPIRATION RATE: 18 BRPM | OXYGEN SATURATION: 98 %

## 2021-10-19 VITALS
SYSTOLIC BLOOD PRESSURE: 152 MMHG | OXYGEN SATURATION: 93 % | HEART RATE: 83 BPM | TEMPERATURE: 98.4 F | RESPIRATION RATE: 12 BRPM | DIASTOLIC BLOOD PRESSURE: 74 MMHG

## 2021-10-19 PROCEDURE — G0157 HHC PT ASSISTANT EA 15: HCPCS

## 2021-10-19 PROCEDURE — G0299 HHS/HOSPICE OF RN EA 15 MIN: HCPCS

## 2021-10-21 ENCOUNTER — HOME CARE VISIT (OUTPATIENT)
Dept: SCHEDULING | Facility: HOME HEALTH | Age: 66
End: 2021-10-21
Payer: MEDICARE

## 2021-10-21 VITALS
HEART RATE: 80 BPM | TEMPERATURE: 97.9 F | RESPIRATION RATE: 16 BRPM | SYSTOLIC BLOOD PRESSURE: 150 MMHG | OXYGEN SATURATION: 95 % | DIASTOLIC BLOOD PRESSURE: 88 MMHG

## 2021-10-21 PROCEDURE — G0151 HHCP-SERV OF PT,EA 15 MIN: HCPCS

## 2021-10-22 ENCOUNTER — HOME CARE VISIT (OUTPATIENT)
Dept: SCHEDULING | Facility: HOME HEALTH | Age: 66
End: 2021-10-22
Payer: MEDICARE

## 2021-10-22 VITALS
DIASTOLIC BLOOD PRESSURE: 70 MMHG | SYSTOLIC BLOOD PRESSURE: 154 MMHG | HEART RATE: 83 BPM | RESPIRATION RATE: 12 BRPM | OXYGEN SATURATION: 94 % | TEMPERATURE: 97.7 F

## 2021-10-22 PROCEDURE — G0299 HHS/HOSPICE OF RN EA 15 MIN: HCPCS

## 2021-10-26 ENCOUNTER — HOME CARE VISIT (OUTPATIENT)
Dept: SCHEDULING | Facility: HOME HEALTH | Age: 66
End: 2021-10-26
Payer: MEDICARE

## 2021-10-26 VITALS
HEART RATE: 68 BPM | SYSTOLIC BLOOD PRESSURE: 122 MMHG | DIASTOLIC BLOOD PRESSURE: 60 MMHG | TEMPERATURE: 97.3 F | OXYGEN SATURATION: 95 % | RESPIRATION RATE: 12 BRPM

## 2021-10-26 PROCEDURE — G0299 HHS/HOSPICE OF RN EA 15 MIN: HCPCS

## 2021-10-29 ENCOUNTER — HOME CARE VISIT (OUTPATIENT)
Dept: SCHEDULING | Facility: HOME HEALTH | Age: 66
End: 2021-10-29
Payer: MEDICARE

## 2021-11-02 ENCOUNTER — HOME CARE VISIT (OUTPATIENT)
Dept: SCHEDULING | Facility: HOME HEALTH | Age: 66
End: 2021-11-02
Payer: MEDICARE

## 2021-11-02 VITALS
DIASTOLIC BLOOD PRESSURE: 64 MMHG | SYSTOLIC BLOOD PRESSURE: 138 MMHG | RESPIRATION RATE: 12 BRPM | HEART RATE: 74 BPM | TEMPERATURE: 97.3 F | OXYGEN SATURATION: 94 %

## 2021-11-02 PROCEDURE — G0299 HHS/HOSPICE OF RN EA 15 MIN: HCPCS

## 2021-11-09 ENCOUNTER — HOME CARE VISIT (OUTPATIENT)
Dept: SCHEDULING | Facility: HOME HEALTH | Age: 66
End: 2021-11-09
Payer: MEDICARE

## 2021-11-09 PROCEDURE — G0299 HHS/HOSPICE OF RN EA 15 MIN: HCPCS

## 2021-11-09 PROCEDURE — 400013 HH SOC

## 2021-11-10 VITALS
HEART RATE: 71 BPM | DIASTOLIC BLOOD PRESSURE: 68 MMHG | OXYGEN SATURATION: 94 % | RESPIRATION RATE: 12 BRPM | SYSTOLIC BLOOD PRESSURE: 140 MMHG | TEMPERATURE: 97.5 F

## 2021-12-06 ENCOUNTER — HOSPITAL ENCOUNTER (OUTPATIENT)
Dept: LAB | Age: 66
Discharge: HOME | End: 2021-12-06
Payer: MEDICARE

## 2021-12-06 DIAGNOSIS — D72.9 NEUTROPHILIA: ICD-10-CM

## 2021-12-06 LAB
ALBUMIN SERPL-MCNC: 3.3 G/DL (ref 3.2–4.6)
ALBUMIN/GLOB SERPL: 0.8 {RATIO} (ref 1.2–3.5)
ALP SERPL-CCNC: 82 U/L (ref 50–136)
ALT SERPL-CCNC: 19 U/L (ref 12–65)
ANION GAP SERPL CALC-SCNC: 4 MMOL/L (ref 7–16)
AST SERPL-CCNC: 8 U/L (ref 15–37)
BASOPHILS # BLD: 0.1 K/UL (ref 0–0.2)
BASOPHILS NFR BLD: 1 % (ref 0–2)
BILIRUB SERPL-MCNC: 0.3 MG/DL (ref 0.2–1.1)
BUN SERPL-MCNC: 8 MG/DL (ref 8–23)
CALCIUM SERPL-MCNC: 9.1 MG/DL (ref 8.3–10.4)
CHLORIDE SERPL-SCNC: 101 MMOL/L (ref 98–107)
CO2 SERPL-SCNC: 31 MMOL/L (ref 21–32)
CREAT SERPL-MCNC: 0.7 MG/DL (ref 0.6–1)
DIFFERENTIAL METHOD BLD: ABNORMAL
EOSINOPHIL # BLD: 0.2 K/UL (ref 0–0.8)
EOSINOPHIL NFR BLD: 1 % (ref 0.5–7.8)
ERYTHROCYTE [DISTWIDTH] IN BLOOD BY AUTOMATED COUNT: 14.2 % (ref 11.9–14.6)
ERYTHROCYTE [SEDIMENTATION RATE] IN BLOOD: 21 MM/HR (ref 0–30)
GLOBULIN SER CALC-MCNC: 4.1 G/DL (ref 2.3–3.5)
GLUCOSE SERPL-MCNC: 188 MG/DL (ref 65–100)
HCT VFR BLD AUTO: 40.5 % (ref 35.8–46.3)
HGB BLD-MCNC: 13.3 G/DL (ref 11.7–15.4)
IMM GRANULOCYTES # BLD AUTO: 0 K/UL (ref 0–0.5)
IMM GRANULOCYTES NFR BLD AUTO: 0 % (ref 0–5)
LYMPHOCYTES # BLD: 4.5 K/UL (ref 0.5–4.6)
LYMPHOCYTES NFR BLD: 38 % (ref 13–44)
MCH RBC QN AUTO: 28.2 PG (ref 26.1–32.9)
MCHC RBC AUTO-ENTMCNC: 32.8 G/DL (ref 31.4–35)
MCV RBC AUTO: 86 FL (ref 79.6–97.8)
MONOCYTES # BLD: 0.7 K/UL (ref 0.1–1.3)
MONOCYTES NFR BLD: 6 % (ref 4–12)
NEUTS SEG # BLD: 6.3 K/UL (ref 1.7–8.2)
NEUTS SEG NFR BLD: 54 % (ref 43–78)
NRBC # BLD: 0 K/UL (ref 0–0.2)
PLATELET # BLD AUTO: 271 K/UL (ref 150–450)
PMV BLD AUTO: 7.9 FL (ref 9.4–12.3)
POTASSIUM SERPL-SCNC: 3.4 MMOL/L (ref 3.5–5.1)
PROT SERPL-MCNC: 7.4 G/DL (ref 6.3–8.2)
RBC # BLD AUTO: 4.71 M/UL (ref 4.05–5.2)
SODIUM SERPL-SCNC: 136 MMOL/L (ref 136–145)
WBC # BLD AUTO: 11.7 K/UL (ref 4.3–11.1)

## 2021-12-06 PROCEDURE — 85652 RBC SED RATE AUTOMATED: CPT

## 2021-12-06 PROCEDURE — 86038 ANTINUCLEAR ANTIBODIES: CPT

## 2021-12-06 PROCEDURE — 36415 COLL VENOUS BLD VENIPUNCTURE: CPT

## 2021-12-06 PROCEDURE — 80053 COMPREHEN METABOLIC PANEL: CPT

## 2021-12-06 PROCEDURE — 85025 COMPLETE CBC W/AUTO DIFF WBC: CPT

## 2021-12-06 PROCEDURE — 86200 CCP ANTIBODY: CPT

## 2021-12-06 PROCEDURE — 86430 RHEUMATOID FACTOR TEST QUAL: CPT

## 2021-12-07 LAB
ANA SER QL: NEGATIVE
CCP IGA+IGG SERPL IA-ACNC: 7 UNITS (ref 0–19)

## 2021-12-08 LAB
PATH REV BLD -IMP: NORMAL
RHEUMATOID FACT SER QL LA: NEGATIVE

## 2022-01-24 ENCOUNTER — HOSPITAL ENCOUNTER (OUTPATIENT)
Dept: MRI IMAGING | Age: 67
Discharge: HOME | End: 2022-01-24
Attending: FAMILY MEDICINE
Payer: MEDICARE

## 2022-01-24 DIAGNOSIS — Q67.5 SCOLIOSIS, CONGENITAL: ICD-10-CM

## 2022-01-24 DIAGNOSIS — M54.50 ACUTE MIDLINE LOW BACK PAIN WITHOUT SCIATICA: ICD-10-CM

## 2022-01-24 DIAGNOSIS — M51.36 DDD (DEGENERATIVE DISC DISEASE), LUMBAR: ICD-10-CM

## 2022-01-24 PROCEDURE — 72148 MRI LUMBAR SPINE W/O DYE: CPT

## 2022-01-26 NOTE — PROGRESS NOTES
Your MRI shows severe narrowing of the canals through which the nerves run. We could try sending you to neurosurgery to see if they could do something to improve this if she would like.   Let me know and I will put in the referral.

## 2022-02-04 PROBLEM — Z91.89 AT RISK FOR ALLERGIC REACTION TO MEDICATION: Status: ACTIVE | Noted: 2022-02-04

## 2022-02-04 PROBLEM — R80.0 ISOLATED PROTEINURIA WITHOUT SPECIFIC MORPHOLOGIC LESION: Status: ACTIVE | Noted: 2022-01-06

## 2022-02-04 PROBLEM — E11.22 TYPE 2 DIABETES MELLITUS WITH CHRONIC KIDNEY DISEASE (HCC): Status: ACTIVE | Noted: 2022-02-04

## 2022-02-25 ENCOUNTER — HOSPITAL ENCOUNTER (OUTPATIENT)
Dept: LAB | Age: 67
Discharge: HOME | End: 2022-02-25
Payer: MEDICARE

## 2022-02-25 DIAGNOSIS — D72.9 NEUTROPHILIA: ICD-10-CM

## 2022-02-25 LAB
ALBUMIN SERPL-MCNC: 3.3 G/DL (ref 3.2–4.6)
ALBUMIN/GLOB SERPL: 0.9 {RATIO} (ref 1.2–3.5)
ALP SERPL-CCNC: 75 U/L (ref 50–136)
ALT SERPL-CCNC: 18 U/L (ref 12–65)
ANION GAP SERPL CALC-SCNC: 6 MMOL/L (ref 7–16)
AST SERPL-CCNC: 9 U/L (ref 15–37)
BASOPHILS # BLD: 0 K/UL (ref 0–0.2)
BASOPHILS NFR BLD: 0 % (ref 0–2)
BILIRUB SERPL-MCNC: 0.4 MG/DL (ref 0.2–1.1)
BUN SERPL-MCNC: 13 MG/DL (ref 8–23)
CALCIUM SERPL-MCNC: 8.7 MG/DL (ref 8.3–10.4)
CHLORIDE SERPL-SCNC: 100 MMOL/L (ref 98–107)
CO2 SERPL-SCNC: 29 MMOL/L (ref 21–32)
CREAT SERPL-MCNC: 0.7 MG/DL (ref 0.6–1)
DIFFERENTIAL METHOD BLD: ABNORMAL
EOSINOPHIL # BLD: 0.1 K/UL (ref 0–0.8)
EOSINOPHIL NFR BLD: 1 % (ref 0.5–7.8)
ERYTHROCYTE [DISTWIDTH] IN BLOOD BY AUTOMATED COUNT: 12.8 % (ref 11.9–14.6)
GLOBULIN SER CALC-MCNC: 3.7 G/DL (ref 2.3–3.5)
GLUCOSE SERPL-MCNC: 287 MG/DL (ref 65–100)
HCT VFR BLD AUTO: 40.5 % (ref 35.8–46.3)
HGB BLD-MCNC: 13.5 G/DL (ref 11.7–15.4)
IMM GRANULOCYTES # BLD AUTO: 0 K/UL (ref 0–0.5)
IMM GRANULOCYTES NFR BLD AUTO: 0 % (ref 0–5)
LYMPHOCYTES # BLD: 3.5 K/UL (ref 0.5–4.6)
LYMPHOCYTES NFR BLD: 34 % (ref 13–44)
MCH RBC QN AUTO: 28.4 PG (ref 26.1–32.9)
MCHC RBC AUTO-ENTMCNC: 33.3 G/DL (ref 31.4–35)
MCV RBC AUTO: 85.1 FL (ref 79.6–97.8)
MONOCYTES # BLD: 0.6 K/UL (ref 0.1–1.3)
MONOCYTES NFR BLD: 6 % (ref 4–12)
NEUTS SEG # BLD: 5.9 K/UL (ref 1.7–8.2)
NEUTS SEG NFR BLD: 59 % (ref 43–78)
NRBC # BLD: 0 K/UL (ref 0–0.2)
PLATELET # BLD AUTO: 261 K/UL (ref 150–450)
PMV BLD AUTO: 8.3 FL (ref 9.4–12.3)
POTASSIUM SERPL-SCNC: 3.3 MMOL/L (ref 3.5–5.1)
PROT SERPL-MCNC: 7 G/DL (ref 6.3–8.2)
RBC # BLD AUTO: 4.76 M/UL (ref 4.05–5.2)
SODIUM SERPL-SCNC: 135 MMOL/L (ref 136–145)
WBC # BLD AUTO: 10.3 K/UL (ref 4.3–11.1)

## 2022-02-25 PROCEDURE — 36415 COLL VENOUS BLD VENIPUNCTURE: CPT

## 2022-02-25 PROCEDURE — 80053 COMPREHEN METABOLIC PANEL: CPT

## 2022-02-25 PROCEDURE — 85025 COMPLETE CBC W/AUTO DIFF WBC: CPT

## 2022-03-18 PROBLEM — E11.22 TYPE 2 DIABETES MELLITUS WITH CHRONIC KIDNEY DISEASE (HCC): Status: ACTIVE | Noted: 2022-02-04

## 2022-03-19 PROBLEM — D22.5 MELANOCYTIC NEVUS OF TRUNK: Status: ACTIVE | Noted: 2018-09-06

## 2022-03-19 PROBLEM — R01.1 MURMUR, CARDIAC: Status: ACTIVE | Noted: 2021-04-05

## 2022-03-19 PROBLEM — E11.21 TYPE 2 DIABETES WITH NEPHROPATHY (HCC): Status: ACTIVE | Noted: 2018-02-21

## 2022-03-19 PROBLEM — M17.12 ARTHRITIS OF LEFT KNEE: Status: ACTIVE | Noted: 2019-01-24

## 2022-03-19 PROBLEM — R80.0 ISOLATED PROTEINURIA WITHOUT SPECIFIC MORPHOLOGIC LESION: Status: ACTIVE | Noted: 2022-01-06

## 2022-03-19 PROBLEM — I25.84 CORONARY ARTERY CALCIFICATION: Status: ACTIVE | Noted: 2021-04-05

## 2022-03-19 PROBLEM — M19.90 OSTEOARTHRITIS: Status: ACTIVE | Noted: 2020-01-01

## 2022-03-19 PROBLEM — M17.0 OSTEOARTHRITIS OF BOTH KNEES: Status: ACTIVE | Noted: 2018-10-31

## 2022-03-19 PROBLEM — Z91.89 AT RISK FOR ALLERGIC REACTION TO MEDICATION: Status: ACTIVE | Noted: 2022-02-04

## 2022-03-19 PROBLEM — I25.10 CORONARY ARTERY CALCIFICATION: Status: ACTIVE | Noted: 2021-04-05

## 2022-03-19 PROBLEM — G93.40 ACUTE ENCEPHALOPATHY: Status: ACTIVE | Noted: 2021-10-01

## 2022-03-19 PROBLEM — Z72.0 TOBACCO ABUSE: Status: ACTIVE | Noted: 2021-04-05

## 2022-03-20 PROBLEM — Z00.00 PREVENTATIVE HEALTH CARE: Status: ACTIVE | Noted: 2018-04-10

## 2022-03-20 PROBLEM — I45.10 RBBB: Status: ACTIVE | Noted: 2021-04-05

## 2022-04-07 PROBLEM — F98.8 ATTENTION DEFICIT DISORDER (ADD) WITHOUT HYPERACTIVITY: Status: ACTIVE | Noted: 2022-04-07

## 2022-04-11 PROBLEM — F98.8 ATTENTION DEFICIT DISORDER (ADD) WITHOUT HYPERACTIVITY: Status: ACTIVE | Noted: 2022-04-07

## 2022-04-27 DIAGNOSIS — D72.9 NEUTROPHILIA: Primary | ICD-10-CM

## 2022-05-02 ENCOUNTER — NURSE TRIAGE (OUTPATIENT)
Dept: OTHER | Facility: CLINIC | Age: 67
End: 2022-05-02

## 2022-05-02 NOTE — TELEPHONE ENCOUNTER
Received call from [place] with Red Flag Complaint. Subjective: Caller states \"Toe turning black\"     Current Symptoms: Right pinky toe turning black. Swelling noted. Onset: 1 week ago; gradual    Associated Symptoms: NA    Pain Severity: 8/10; pain; constant    Temperature: \"hotflashes\"    What has been tried: None    LMP: NA Pregnant: NA    Recommended disposition: Go to ED/UCC Now (Or to Office with PCP Approval)    Care advice provided, patient verbalizes understanding; denies any other questions or concerns; instructed to call back for any new or worsening symptoms. Writer provided warm transfer to [facility] [place] for 2nd level triage    Attention Provider: Thank you for allowing me to participate in the care of your patient. The patient was connected to triage in response to information provided to the ECC. Please do not respond through this encounter as the response is not directed to a shared pool.       Reason for Disposition   Wound looks infected   Black (necrotic) or blisters develop in wound    Protocols used: TOE PAIN-ADULT-OH, WOUND INFECTION-ADULT-OH

## 2022-05-06 PROBLEM — Z79.4 TYPE 2 DIABETES MELLITUS WITH CHRONIC KIDNEY DISEASE, WITH LONG-TERM CURRENT USE OF INSULIN (HCC): Status: ACTIVE | Noted: 2022-02-04

## 2022-05-06 PROBLEM — E11.22 TYPE 2 DIABETES MELLITUS WITH CHRONIC KIDNEY DISEASE, WITH LONG-TERM CURRENT USE OF INSULIN (HCC): Status: ACTIVE | Noted: 2022-02-04

## 2022-05-06 PROBLEM — R41.3 MEMORY LOSS: Status: ACTIVE | Noted: 2022-05-06

## 2022-05-06 PROBLEM — G62.9 NEUROPATHY: Status: ACTIVE | Noted: 2022-05-06

## 2022-05-06 PROBLEM — R80.9 MICROALBUMINURIA: Status: ACTIVE | Noted: 2022-05-06

## 2022-05-13 PROBLEM — L98.9 SCALP LESION: Status: ACTIVE | Noted: 2022-05-13

## 2022-05-13 PROBLEM — M51.36 DDD (DEGENERATIVE DISC DISEASE), LUMBAR: Status: ACTIVE | Noted: 2022-05-13

## 2022-05-13 PROBLEM — I77.9 PERIPHERAL ARTERY OCCLUSION (HCC): Status: ACTIVE | Noted: 2022-05-07

## 2022-05-13 PROBLEM — R20.2 PARESTHESIA OF BOTH LEGS: Status: ACTIVE | Noted: 2022-05-13

## 2022-05-13 PROBLEM — R06.2 WHEEZE: Status: ACTIVE | Noted: 2022-05-13

## 2022-05-13 PROBLEM — M48.061 NEURAL FORAMINAL STENOSIS OF LUMBAR SPINE: Status: ACTIVE | Noted: 2022-05-13

## 2022-05-13 PROBLEM — M79.671 RIGHT FOOT PAIN: Status: ACTIVE | Noted: 2022-05-13

## 2022-05-13 PROBLEM — R23.0 TOE CYANOSIS: Status: ACTIVE | Noted: 2022-05-07

## 2022-05-13 PROBLEM — M79.676 PAIN OF FIFTH TOE: Status: ACTIVE | Noted: 2022-05-13

## 2022-05-13 PROBLEM — M51.369 DDD (DEGENERATIVE DISC DISEASE), LUMBAR: Status: ACTIVE | Noted: 2022-05-13

## 2022-06-01 RX ORDER — ROSUVASTATIN CALCIUM 10 MG/1
TABLET, COATED ORAL
Qty: 30 TABLET | Refills: 5 | Status: SHIPPED | OUTPATIENT
Start: 2022-06-01 | End: 2022-07-21

## 2022-06-08 DIAGNOSIS — Z87.891 PERSONAL HISTORY OF TOBACCO USE, PRESENTING HAZARDS TO HEALTH: Primary | ICD-10-CM

## 2022-06-09 NOTE — PROGRESS NOTES
Orders Placed This Encounter   Procedures    CT LUNG SCREENING     Age: 75 y.o. Smoking History:   Social History    Tobacco Use      Smoking status: Former Smoker        Packs/day: 1.00        Quit date: 2021        Years since quittin.7      Smokeless tobacco: Former User        Quit date: 2018      Tobacco comment: Quit smoking: quit    Alcohol use: No    Drug use: No      Types: Prescription    Pack years: 0  No previous lung cancer screening exam     Standing Status:   Future     Standing Expiration Date:   2023     Order Specific Question:   Is there documentation of shared decision making? Answer:   Yes     Order Specific Question:   Does the patient show any signs or symptoms of lung cancer? Answer:   No     Order Specific Question:   Is this the first (baseline) CT or an annual exam?     Answer:   Baseline [1]     Order Specific Question:   Is this a low dose CT or a routine CT? Answer:   Low Dose CT [1]     Order Specific Question:   Smoking Status? Answer: Former Smoker [4]     Order Specific Question:   Date quit smoking? (must be within 15 years)     Answer:   2021     Order Specific Question:   Smoking packs per day? Answer:   1     Order Specific Question:   Years smoking? Answer:   36     Order Specific Question:   Has the patient been exposed to a high level of radon? Answer:   Yes [1]     Comments:   [address] [place] [address] [address] resident     Order Specific Question:   Has the patient been occupationally exposed to agents that are carcinogens targeting the lungs? Answer:    No

## 2022-06-23 ENCOUNTER — OFFICE VISIT (OUTPATIENT)
Dept: FAMILY MEDICINE CLINIC | Facility: CLINIC | Age: 67
End: 2022-06-23
Payer: MEDICARE

## 2022-06-23 VITALS
DIASTOLIC BLOOD PRESSURE: 75 MMHG | BODY MASS INDEX: 27.68 KG/M2 | TEMPERATURE: 98.1 F | RESPIRATION RATE: 16 BRPM | SYSTOLIC BLOOD PRESSURE: 154 MMHG | HEART RATE: 89 BPM | OXYGEN SATURATION: 95 % | WEIGHT: 141 LBS | HEIGHT: 60 IN

## 2022-06-23 DIAGNOSIS — E78.2 MIXED HYPERLIPIDEMIA: ICD-10-CM

## 2022-06-23 DIAGNOSIS — M17.31 POST-TRAUMATIC OSTEOARTHRITIS OF RIGHT KNEE: ICD-10-CM

## 2022-06-23 DIAGNOSIS — M48.061 NEURAL FORAMINAL STENOSIS OF LUMBAR SPINE: ICD-10-CM

## 2022-06-23 DIAGNOSIS — I77.9 PERIPHERAL ARTERY OCCLUSION (HCC): ICD-10-CM

## 2022-06-23 DIAGNOSIS — F33.1 MAJOR DEPRESSIVE DISORDER, RECURRENT EPISODE, MODERATE (HCC): ICD-10-CM

## 2022-06-23 DIAGNOSIS — M62.838 TRAPEZIUS MUSCLE SPASM: ICD-10-CM

## 2022-06-23 DIAGNOSIS — Z79.4 TYPE 2 DIABETES MELLITUS WITH OTHER SPECIFIED COMPLICATION, WITH LONG-TERM CURRENT USE OF INSULIN (HCC): Primary | ICD-10-CM

## 2022-06-23 DIAGNOSIS — R20.2 PARESTHESIA OF BOTH LEGS: ICD-10-CM

## 2022-06-23 DIAGNOSIS — G62.9 NEUROPATHY: ICD-10-CM

## 2022-06-23 DIAGNOSIS — I10 PRIMARY HYPERTENSION: ICD-10-CM

## 2022-06-23 DIAGNOSIS — E11.21 TYPE 2 DIABETES WITH NEPHROPATHY (HCC): ICD-10-CM

## 2022-06-23 DIAGNOSIS — E11.69 TYPE 2 DIABETES MELLITUS WITH OTHER SPECIFIED COMPLICATION, WITH LONG-TERM CURRENT USE OF INSULIN (HCC): Primary | ICD-10-CM

## 2022-06-23 DIAGNOSIS — M51.36 DDD (DEGENERATIVE DISC DISEASE), LUMBAR: ICD-10-CM

## 2022-06-23 DIAGNOSIS — K27.9 PUD (PEPTIC ULCER DISEASE): ICD-10-CM

## 2022-06-23 PROCEDURE — 1123F ACP DISCUSS/DSCN MKR DOCD: CPT | Performed by: FAMILY MEDICINE

## 2022-06-23 PROCEDURE — 3052F HG A1C>EQUAL 8.0%<EQUAL 9.0%: CPT | Performed by: FAMILY MEDICINE

## 2022-06-23 PROCEDURE — 99214 OFFICE O/P EST MOD 30 MIN: CPT | Performed by: FAMILY MEDICINE

## 2022-06-23 RX ORDER — CLOPIDOGREL BISULFATE 75 MG/1
75 TABLET ORAL DAILY
COMMUNITY

## 2022-06-23 RX ORDER — AMLODIPINE BESYLATE 5 MG/1
TABLET ORAL
Qty: 90 TABLET | Refills: 5 | Status: SHIPPED | OUTPATIENT
Start: 2022-06-23

## 2022-06-23 RX ORDER — ROSUVASTATIN CALCIUM 20 MG/1
20 TABLET, COATED ORAL NIGHTLY
Qty: 30 TABLET | Refills: 3 | Status: SHIPPED | OUTPATIENT
Start: 2022-06-23 | End: 2022-10-21

## 2022-06-23 RX ORDER — VILAZODONE HYDROCHLORIDE 20 MG/1
TABLET ORAL
Qty: 30 TABLET | Refills: 5 | Status: SHIPPED | OUTPATIENT
Start: 2022-06-23 | End: 2022-09-28

## 2022-06-23 RX ORDER — METOPROLOL SUCCINATE 50 MG/1
TABLET, EXTENDED RELEASE ORAL
Qty: 90 TABLET | Refills: 1 | Status: SHIPPED | OUTPATIENT
Start: 2022-06-23 | End: 2022-09-28 | Stop reason: SDUPTHER

## 2022-06-23 RX ORDER — LUBIPROSTONE 24 UG/1
24 CAPSULE, GELATIN COATED ORAL
COMMUNITY
End: 2022-09-28 | Stop reason: SDUPTHER

## 2022-06-23 RX ORDER — OLMESARTAN MEDOXOMIL 40 MG/1
TABLET ORAL
Qty: 90 TABLET | Refills: 1 | Status: SHIPPED | OUTPATIENT
Start: 2022-06-23 | End: 2022-09-28 | Stop reason: SDUPTHER

## 2022-06-23 RX ORDER — PANTOPRAZOLE SODIUM 40 MG/1
TABLET, DELAYED RELEASE ORAL
Qty: 180 TABLET | Refills: 3 | Status: SHIPPED | OUTPATIENT
Start: 2022-06-23

## 2022-06-23 RX ORDER — CYCLOBENZAPRINE HCL 10 MG
TABLET ORAL
Qty: 90 TABLET | Refills: 2 | Status: SHIPPED | OUTPATIENT
Start: 2022-06-23 | End: 2022-07-21 | Stop reason: SDUPTHER

## 2022-06-23 ASSESSMENT — PATIENT HEALTH QUESTIONNAIRE - PHQ9
5. POOR APPETITE OR OVEREATING: 1
9. THOUGHTS THAT YOU WOULD BE BETTER OFF DEAD, OR OF HURTING YOURSELF: 0
SUM OF ALL RESPONSES TO PHQ QUESTIONS 1-9: 13
7. TROUBLE CONCENTRATING ON THINGS, SUCH AS READING THE NEWSPAPER OR WATCHING TELEVISION: 3
8. MOVING OR SPEAKING SO SLOWLY THAT OTHER PEOPLE COULD HAVE NOTICED. OR THE OPPOSITE, BEING SO FIGETY OR RESTLESS THAT YOU HAVE BEEN MOVING AROUND A LOT MORE THAN USUAL: 1
10. IF YOU CHECKED OFF ANY PROBLEMS, HOW DIFFICULT HAVE THESE PROBLEMS MADE IT FOR YOU TO DO YOUR WORK, TAKE CARE OF THINGS AT HOME, OR GET ALONG WITH OTHER PEOPLE: 2
SUM OF ALL RESPONSES TO PHQ QUESTIONS 1-9: 13
1. LITTLE INTEREST OR PLEASURE IN DOING THINGS: 3
6. FEELING BAD ABOUT YOURSELF - OR THAT YOU ARE A FAILURE OR HAVE LET YOURSELF OR YOUR FAMILY DOWN: 0
SUM OF ALL RESPONSES TO PHQ QUESTIONS 1-9: 13
2. FEELING DOWN, DEPRESSED OR HOPELESS: 1
SUM OF ALL RESPONSES TO PHQ QUESTIONS 1-9: 13
3. TROUBLE FALLING OR STAYING ASLEEP: 1
SUM OF ALL RESPONSES TO PHQ9 QUESTIONS 1 & 2: 4
4. FEELING TIRED OR HAVING LITTLE ENERGY: 3

## 2022-06-23 ASSESSMENT — ENCOUNTER SYMPTOMS
SORE THROAT: 0
SWOLLEN GLANDS: 0
VOMITING: 0
CHANGE IN BOWEL HABIT: 0
NAUSEA: 1
VISUAL CHANGE: 0

## 2022-06-23 NOTE — PROGRESS NOTES
HISTORY OF PRESENT ILLNESS  Chief Complaint   Patient presents with    Depression     phq9-13    Nausea     5 days; started plavix and thinks this may be a cause     Nausea 5 days; started plavix and thinks this may be a cause    phq9-13    Oneyda Paz in the neck for 3 days now on the right side and goes down into the trapezius. Out of flexeril - wasn't working. Pain referred her to Dr. Cindie Nissen who said she may need a spinal stimulator but has had that and it didn't work  Tried it in January and the battery pack alone is real expensive. Had to take it out in October. did not work for her and caused her to have arm pain. Was glad to have it removed. Not trusting them anymore. Unhappy with the practice. Even the skin in the legs around the calf and stuff hurts    She has taper down on her meds and now the pain is worse  They are not addressing her pain    Has heartburn and indigestion that wakes her up in the night. Her anxiety is bad. Got bit by her dog and started crying. [address] Has tried everything over the years. Avoiding everyone's phone calls for awhile. PHQ-9  Little interest or pleasure in doing things: Nearly every day  Feeling down, depressed, or hopeless: Several days  Trouble falling or staying asleep, or sleeping too much: Several days  Feeling tired or having little energy: Nearly every day  Poor appetite or overeating: Several days  Feeling bad about yourself - or that you are a failure or have let yourself or your family down: Not at all  Trouble concentrating on things, such as reading the newspaper or watching television: Nearly every day  Moving or speaking so slowly that other people could have noticed.  Or the opposite - being so fidgety or restless that you have been moving around a lot more than usual: Several days  Thoughts that you would be better off dead, or of hurting yourself in some way: Not at all  PHQ-9 Total Score: 13  If you checked off any problems, how difficult have these problems made it for you to do your work, take care of things at home, or get along with other people?: Very difficult    Previously said, \" Mrs. Kevin Brush is a 75year old  female with a past medical history of DM2, HTN, tobacco use, chronic left hip and right knee pain and PAD. She presents for evaluation of purplish discoloration of her right 5th toe that started earlier this month. She reports that it turned black over night. She was seen in the ED at [facility] where lower extremity non-invasive studies were done and showed normal left MINERVA and a right MINERVA of 0.77 with proximal and distal SFA stenosis and toe pressures adequate for wound healing bilaterally. She was referred to vascular surgery for evaluation. She states that her right 5th toe returned to normal color after discharge from the hospital but is still painful. She is already taking ASA & a statin. She has a hard time ambulating due to her chronic pain. She denies chest pain, SOB, fever and chills and tissue necrosis and admits to some right calf claudication with ambulation. will add plavix 75mg daily with refills. She has right proximal and distal SFA stenosis that causes right calf claudication. Recommended she starts dual antiplatelet therapy and ambulate as much as possible. Follow up in 6 months with repeat ABIs. \"    After a couple of day peeled of some thick skin and the toe looks normal after that. Not hurting now. The 5th and the 1st toe on the right can wake her up at night. Any shoes rub. Has gone up from a 5 to a 7 and 1/2. Nausea & Vomiting  This is a new problem. The current episode started in the past 7 days. Associated symptoms include arthralgias and nausea.  Pertinent negatives include no abdominal pain, anorexia, change in bowel habit, chest pain, chills, congestion, coughing, diaphoresis, fatigue, fever, headaches, joint swelling, myalgias, neck pain, numbness, rash, sore throat, swollen glands, urinary symptoms, vertigo, visual change, vomiting or weakness. Review of Systems   Constitutional: Negative for activity change, appetite change, chills, diaphoresis, fatigue and fever. HENT: Negative for congestion, rhinorrhea and sore throat. Respiratory: Negative for cough, shortness of breath and wheezing. Cardiovascular: Negative for chest pain. Gastrointestinal: Positive for nausea. Negative for abdominal pain, anorexia, change in bowel habit, constipation, diarrhea and vomiting. Genitourinary: Negative for dysuria. Musculoskeletal: Positive for arthralgias. Negative for back pain, gait problem, joint swelling, myalgias and neck pain. Skin: Negative for color change, rash and wound. Neurological: Negative for dizziness, vertigo, tremors, weakness, numbness and headaches. Psychiatric/Behavioral: Negative for dysphoric mood. The patient is not nervous/anxious.        Patient Active Problem List   Diagnosis    Back pain    Renal insufficiency    PUD (peptic ulcer disease)    Mixed hyperlipidemia    Neutrophilia    DM (diabetes mellitus) ([address]    Tremor    Melanocytic nevus of trunk    Falls frequently    Bacteremia    Hypokalemia    Coronary artery calcification    Major depressive disorder, recurrent episode, moderate (HCC)    Chronic pain    Hypertension    Osteoarthritis of both knees    Murmur, cardiac    At risk for allergic reaction to medication    Isolated proteinuria without specific morphologic lesion    Arthritis of left knee    History of colon cancer    GERD (gastroesophageal reflux disease)    MDD (major depressive disorder)    Scoliosis, congenital    Tobacco abuse    Osteoarthritis    CAD (coronary artery disease)    Insomnia    Acute encephalopathy    Umbilical hernia without obstruction and without gangrene    Type 2 diabetes with nephropathy (HCC)    Cancer (HCC)    Chronic narcotic dependence ([address]    Constipation    Preventative health care    Skin is warm and dry. Neurological:      Mental Status: She is alert. Psychiatric:         Mood and Affect: Mood normal.         Behavior: Behavior normal.         Thought Content: Thought content normal.         Judgment: Judgment normal.              ASSESSMENT and PLAN   Diagnosis Orders   1. Type 2 diabetes mellitus with other specified complication, with long-term current use of insulin ([facility]     2. Peripheral artery occlusion (HCC)     3. Major depressive disorder, recurrent episode, moderate (HCC)  Vilazodone HCl 10 & 20 MG KIT    vilazodone HCl (VILAZODONE HCL) 20 MG TABS   4. [facility] 2 diabetes [facility] nephropathy ([facility]     5. PUD (peptic ulcer disease)  pantoprazole (PROTONIX) 40 MG tablet   6. Primary hypertension  amLODIPine (NORVASC) 5 MG tablet    olmesartan (BENICAR) 40 MG tablet    metoprolol succinate (TOPROL XL) 50 MG extended release tablet   7. Trapezius muscle spasm  cyclobenzaprine (FLEXERIL) 10 MG tablet   8. Mixed hyperlipidemia  rosuvastatin (CRESTOR) 20 MG tablet   9. DDD (degenerative disc disease), lumbar  AFL - David Hill DO, Pain Management, [place]   10. Neural foraminal stenosis of lumbar spine  CHARISSA - David Hill DO, Pain Management, [place]   11. Paresthesia of both legs  CHARISSA - David Hill DO, Pain Management, [place]   12. Neuropathy  CHARISSA - David Hill, DO, Pain Management, [place]   13. Post-traumatic osteoarthritis of right knee  David Paulino DO, Pain Management, [place]       Reviewed medications and side effects in detail    Viibryd Instructions - Take with food! Take 1 each morning of the pink pills. After the first week take 1 of the peach pills until they are all gone. Then increase to 1/2 of the blue pills each morning. You may get some upset stomach and diarrhea. That should improve after you have been on it awhile. Try to take it about the same time each day to help with that. Her other chronic conditions are stable at this time.   She is stable on the current treatment and tolerating it well. No significant sides effects. Will not adjust therapy at this time, unless noted above. We will continue to monitor for any problems. Medications refilled and lab work has been ordered where needed. Reviewed medications are explained including any potential interactions or side effects in detail. The patient's questions were answered. She  understands the above and has no further questions. Further workup and treatment should be done if symptoms persist, worsen or new symptoms occur. She  will call to notify us of any problems, complications or worsening symptoms. Follow-up and Dispositions    · Return in about 4 weeks (around 7/21/2022) for EOV. There are no Patient Instructions on file for this visit. (Some details in this note may have been created with speech-recognition software. Some errors in speech recognition may have occurred.    Most of those have been corrected but some may have been missed.)         Basilia Murillo MD  [address] [facility]  Phone [phone]

## 2022-06-27 ENCOUNTER — TELEPHONE (OUTPATIENT)
Dept: ONCOLOGY | Age: 67
End: 2022-06-27

## 2022-06-27 RX ORDER — INSULIN DEGLUDEC 200 U/ML
INJECTION, SOLUTION SUBCUTANEOUS
Qty: 18 ML | Refills: 5 | Status: SHIPPED | OUTPATIENT
Start: 2022-06-27

## 2022-06-30 ASSESSMENT — ENCOUNTER SYMPTOMS
WHEEZING: 0
DIARRHEA: 0
ABDOMINAL PAIN: 0
COUGH: 0
SHORTNESS OF BREATH: 0
RHINORRHEA: 0
CONSTIPATION: 0
BACK PAIN: 0
COLOR CHANGE: 0

## 2022-07-06 ENCOUNTER — TELEPHONE (OUTPATIENT)
Dept: FAMILY MEDICINE CLINIC | Facility: CLINIC | Age: 67
End: 2022-07-06

## 2022-07-06 DIAGNOSIS — M79.676 PAIN OF TOE, UNSPECIFIED LATERALITY: Primary | ICD-10-CM

## 2022-07-06 NOTE — TELEPHONE ENCOUNTER
Pt requesting referral to podiatry. Needs an office close to [facility] like   [facility] or [facility], not in [place] because it is too far, it is an   hour and half away.  Pt states Dr. Tao Redd knows why she needs this referral

## 2022-07-06 NOTE — TELEPHONE ENCOUNTER
Orders Placed This Encounter   Procedures    External Referral To Podiatry     Referral Priority:   Routine     Referral Type:   Eval and Treat     Referral Reason:   Specialty Services Required     Requested Specialty:   Podiatry     Number of Visits Requested:   1

## 2022-07-21 ENCOUNTER — OFFICE VISIT (OUTPATIENT)
Dept: FAMILY MEDICINE CLINIC | Facility: CLINIC | Age: 67
End: 2022-07-21
Payer: MEDICARE

## 2022-07-21 VITALS
TEMPERATURE: 98 F | HEIGHT: 60 IN | RESPIRATION RATE: 16 BRPM | DIASTOLIC BLOOD PRESSURE: 59 MMHG | SYSTOLIC BLOOD PRESSURE: 140 MMHG | HEART RATE: 83 BPM | OXYGEN SATURATION: 97 % | WEIGHT: 138.6 LBS | BODY MASS INDEX: 27.21 KG/M2

## 2022-07-21 DIAGNOSIS — E11.22 TYPE 2 DIABETES MELLITUS WITH CHRONIC KIDNEY DISEASE, WITH LONG-TERM CURRENT USE OF INSULIN, UNSPECIFIED CKD STAGE (HCC): ICD-10-CM

## 2022-07-21 DIAGNOSIS — I10 ESSENTIAL HYPERTENSION WITH GOAL BLOOD PRESSURE LESS THAN 140/90: ICD-10-CM

## 2022-07-21 DIAGNOSIS — F33.1 MAJOR DEPRESSIVE DISORDER, RECURRENT EPISODE, MODERATE (HCC): Primary | ICD-10-CM

## 2022-07-21 DIAGNOSIS — J30.89 CHRONIC NONSEASONAL ALLERGIC RHINITIS DUE TO POLLEN: ICD-10-CM

## 2022-07-21 DIAGNOSIS — F98.8 ATTENTION DEFICIT DISORDER (ADD) WITHOUT HYPERACTIVITY: ICD-10-CM

## 2022-07-21 DIAGNOSIS — Z79.4 TYPE 2 DIABETES MELLITUS WITH CHRONIC KIDNEY DISEASE, WITH LONG-TERM CURRENT USE OF INSULIN, UNSPECIFIED CKD STAGE (HCC): ICD-10-CM

## 2022-07-21 DIAGNOSIS — F39 MOOD DISORDER (HCC): ICD-10-CM

## 2022-07-21 DIAGNOSIS — M62.838 TRAPEZIUS MUSCLE SPASM: ICD-10-CM

## 2022-07-21 PROCEDURE — 99214 OFFICE O/P EST MOD 30 MIN: CPT | Performed by: FAMILY MEDICINE

## 2022-07-21 PROCEDURE — 1123F ACP DISCUSS/DSCN MKR DOCD: CPT | Performed by: FAMILY MEDICINE

## 2022-07-21 PROCEDURE — 3052F HG A1C>EQUAL 8.0%<EQUAL 9.0%: CPT | Performed by: FAMILY MEDICINE

## 2022-07-21 RX ORDER — CYCLOBENZAPRINE HCL 10 MG
TABLET ORAL
Qty: 90 TABLET | Refills: 3 | Status: SHIPPED | OUTPATIENT
Start: 2022-07-21

## 2022-07-21 RX ORDER — MONTELUKAST SODIUM 10 MG/1
10 TABLET ORAL DAILY
Qty: 90 TABLET | Refills: 3 | Status: SHIPPED | OUTPATIENT
Start: 2022-07-21

## 2022-07-21 RX ORDER — VILAZODONE HYDROCHLORIDE 40 MG/1
TABLET ORAL
Qty: 30 TABLET | Refills: 5 | Status: SHIPPED | OUTPATIENT
Start: 2022-07-21

## 2022-07-21 RX ORDER — CLOBETASOL PROPIONATE 0.5 MG/G
OINTMENT TOPICAL
COMMUNITY
Start: 2022-07-15

## 2022-07-21 ASSESSMENT — ENCOUNTER SYMPTOMS
CONSTIPATION: 0
WHEEZING: 0
NAUSEA: 0
ABDOMINAL PAIN: 0
SHORTNESS OF BREATH: 0
VOMITING: 0
RHINORRHEA: 0
COUGH: 0
DIARRHEA: 0

## 2022-07-21 ASSESSMENT — PATIENT HEALTH QUESTIONNAIRE - PHQ9
SUM OF ALL RESPONSES TO PHQ QUESTIONS 1-9: 14
SUM OF ALL RESPONSES TO PHQ QUESTIONS 1-9: 14
7. TROUBLE CONCENTRATING ON THINGS, SUCH AS READING THE NEWSPAPER OR WATCHING TELEVISION: 3
1. LITTLE INTEREST OR PLEASURE IN DOING THINGS: 3
6. FEELING BAD ABOUT YOURSELF - OR THAT YOU ARE A FAILURE OR HAVE LET YOURSELF OR YOUR FAMILY DOWN: 0
9. THOUGHTS THAT YOU WOULD BE BETTER OFF DEAD, OR OF HURTING YOURSELF: 0
5. POOR APPETITE OR OVEREATING: 0
10. IF YOU CHECKED OFF ANY PROBLEMS, HOW DIFFICULT HAVE THESE PROBLEMS MADE IT FOR YOU TO DO YOUR WORK, TAKE CARE OF THINGS AT HOME, OR GET ALONG WITH OTHER PEOPLE: 0
SUM OF ALL RESPONSES TO PHQ QUESTIONS 1-9: 14
SUM OF ALL RESPONSES TO PHQ QUESTIONS 1-9: 14
3. TROUBLE FALLING OR STAYING ASLEEP: 2
SUM OF ALL RESPONSES TO PHQ9 QUESTIONS 1 & 2: 6
8. MOVING OR SPEAKING SO SLOWLY THAT OTHER PEOPLE COULD HAVE NOTICED. OR THE OPPOSITE, BEING SO FIGETY OR RESTLESS THAT YOU HAVE BEEN MOVING AROUND A LOT MORE THAN USUAL: 0
4. FEELING TIRED OR HAVING LITTLE ENERGY: 3
2. FEELING DOWN, DEPRESSED OR HOPELESS: 3

## 2022-07-21 NOTE — PROGRESS NOTES
HISTORY OF PRESENT ILLNESS  Chief Complaint   Patient presents with    Depression     Phq9-14 ; viibryd is not working        Phq9-14 ; viibryd is not working     Still up every hour to use the bathroom during the night. Trying to cut back on the cokes. Got really upset over something really small and if Derick didn't intervene would have really cause a problem. Getting more upset than she needs to about things. Thought that it was working until she got real upset that episode. Neuro referred to ADD specialist Dr Oni Ruiz for eval and treatment. Thx. MoCA 27, full scores of delayed recall. Lack of concentration and short term memory problem 5-10 years. She is cash only and doesn't file on insurance so she didn't go. \"Viibryd Instructions - Take with food! Take 1 each morning of the pink pills. After the first week take 1 of the peach pills until they are all gone. Then increase to 1/2 of the blue pills each morning. You may get some upset stomach and diarrhea. That should improve after you have been on it awhile. Try to take it about the same time each day to help with that. \"    PHQ-9  Little interest or pleasure in doing things: Nearly every day  Feeling down, depressed, or hopeless: Nearly every day  Trouble falling or staying asleep, or sleeping too much: More than half the days  Feeling tired or having little energy: Nearly every day  Poor appetite or overeating: Not at all  Feeling bad about yourself - or that you are a failure or have let yourself or your family down: Not at all  Trouble concentrating on things, such as reading the newspaper or watching television: Nearly every day  Moving or speaking so slowly that other people could have noticed.  Or the opposite - being so fidgety or restless that you have been moving around a lot more than usual: Not at all  Thoughts that you would be better off dead, or of hurting yourself in some way: Not at all  PHQ-9 Total Score: 14  If you checked off any problems, how difficult have these problems made it for you to do your work, take care of things at home, or get along with other people?: Not difficult at all    Diabetes Mellitus: Patient presents for follow up of diabetes. Symptoms: taking medications as instructed, no medication side effects noted, no TIA's, no chest pain on exertion, no dyspnea on exertion, no swelling of ankles. Symptoms have borderline controlled. Patient denies nausea and vomiting. Evaluation to date has been included below. Home sugars:  high after eating fruit . Treatment to date: medications. Diabetic issues reviewed with her: low cholesterol diet, weight control and daily exercise discussed. Key Antihyperglycemic Medications            TRESIBA FLEXTOUCH 200 UNIT/ML SOPN (Taking)    Sig: INJECT 50 UNITS UNDER THE SKIN DAILY           Lab Results   Component Value Date    LABA1C 8.3 (H) 05/06/2022     Lab Results   Component Value Date     05/06/2022      Lab Results   Component Value Date    CREATININE 0.54 (L) 05/06/2022      No results found for: LABMICR, [id number]   Wt Readings from Last 3 Encounters:   07/21/22 138 lb 9.6 oz (62.9 kg)   06/23/22 141 lb (64 kg)   05/13/22 140 lb 3.2 oz (63.6 kg)      BP Readings from Last 3 Encounters:   07/21/22 (!) 140/59   06/23/22 (!) 154/75   05/13/22 (!) 151/68          HPI   Diabetes   The history is provided by the patient. This is a chronic problem. The current  episode started more than 1 week ago. The problem occurs daily. The problem has not changed since onset. Pertinent negatives include no chest pain, no abdominal pain, no headaches and no shortness of breath. The symptoms are aggravated by eating. The  symptoms are relieved by medications. Hypertension    The history is provided by the patient. This is a chronic problem. The  current episode started more than 1 week ago. The problem  has not changed since onset. Pertinent negatives  include no chest pain, no palpitations, no anxiety, no malaise/fatigue, no blurred vision, no headaches, no tinnitus, no neck pain, no peripheral edema, no dizziness, no shortness of breath, no nausea and no vomiting. Depression   The history is provided by the patient. This is a recurrent problem. The current episode started more than 1 week ago. The  problem occurs daily. The problem has been gradually  worsening. Pertinent negatives include no chest pain,  no abdominal pain, no headaches and no shortness of breath. The symptoms are aggravated by stress. Nothing relieves the symptoms. She has tried rest for the symptoms. The treatment provided no relief. Toe Pain    The history is provided by the patient. This is a chronic problem. The current  episode started more than 1 week ago. The problem occurs daily. The problem has been gradually worsening. The pain is present in the right toes. The  pain is at a severity of 8/10. Associated symptoms include back pain. Pertinent negatives include no numbness, full range of motion, no tingling,  no itching and no neck pain. Review of Systems   Constitutional:  Negative for activity change, appetite change, chills, fatigue and fever. HENT:  Negative for congestion and rhinorrhea. Respiratory:  Negative for cough, shortness of breath and wheezing. Gastrointestinal:  Negative for abdominal pain, constipation, diarrhea, nausea and vomiting. Genitourinary:  Negative for dysuria. Musculoskeletal:  Negative for arthralgias and myalgias. Neurological:  Negative for dizziness and headaches. Psychiatric/Behavioral:  Positive for agitation, decreased concentration and dysphoric mood. The patient is not nervous/anxious.        Patient Active Problem List   Diagnosis    Back pain    Renal insufficiency    PUD (peptic ulcer disease)    Mixed hyperlipidemia    Neutrophilia    DM (diabetes mellitus) ([facility]    Tremor    Melanocytic nevus of trunk    Falls frequently    Bacteremia Hypokalemia    Coronary artery calcification    Major depressive disorder, recurrent episode, moderate (HCC)    Chronic pain    Hypertension    Osteoarthritis of both knees    Murmur, cardiac    At risk for allergic reaction to medication    Isolated proteinuria without specific morphologic lesion    Arthritis of left knee    History of colon cancer    GERD (gastroesophageal reflux disease)    MDD (major depressive disorder)    Scoliosis, congenital    Tobacco abuse    Osteoarthritis    CAD (coronary artery disease)    Insomnia    Acute encephalopathy    Umbilical hernia without obstruction and without gangrene    Type 2 diabetes with nephropathy (HCC)    Cancer (HCC)    Chronic narcotic dependence (HCC)    Constipation    Preventative health care    Edema of both legs    Thyroid nodule    RBBB    Post-traumatic osteoarthritis of right knee    Attention deficit disorder (ADD) without hyperactivity    Type 2 diabetes mellitus with chronic kidney disease, with long-term current use of insulin ([address]    Type 2 diabetes mellitus without complication, with long-term current use of insulin ([address]    Essential hypertension with goal blood pressure less than 140/90    Chronic obstructive pulmonary disease (HCC)    Microalbuminuria    Memory loss    Neuropathy    Peripheral artery occlusion (HCC)    Toe cyanosis    Paresthesia of both legs    Neural foraminal stenosis of lumbar spine    DDD (degenerative disc disease), lumbar    Pain of fifth toe    Scalp lesion    Right foot pain    Wheeze    Urethral stenosis    Mood disorder (HCC)    Chronic nonseasonal allergic rhinitis due to pollen    Trapezius muscle spasm         Blood pressure (!) 140/59, pulse 83, temperature 98 °F (36.7 °C), temperature source Temporal, resp. rate 16, height 5' (1.524 m), weight 138 lb 9.6 oz (62.9 kg), SpO2 97 %. Pain Scale: 6/10 Pain Location: Back  Body mass index is 27.07 kg/m².    Physical Exam  Constitutional:       General: She is not in acute distress. Appearance: Normal appearance. She is normal weight. She is not toxic-appearing. HENT:      Head: Normocephalic and atraumatic. Eyes:      Extraocular Movements: Extraocular movements intact. Conjunctiva/sclera: Conjunctivae normal.      Pupils: Pupils are equal, round, and reactive to light. Cardiovascular:      Rate and Rhythm: Normal rate and regular rhythm. Pulses: Normal pulses. Heart sounds: Normal heart sounds. No murmur heard. No friction rub. No gallop. Pulmonary:      Effort: Pulmonary effort is normal. No respiratory distress. Breath sounds: Normal breath sounds. No wheezing or rales. Skin:     General: Skin is warm and dry. Neurological:      Mental Status: She is alert. Psychiatric:         Mood and Affect: Mood normal.         Behavior: Behavior normal.         Thought Content: Thought content normal.         Judgment: Judgment normal.            ASSESSMENT and PLAN   Diagnosis Orders   1. Major depressive disorder, recurrent episode, moderate (HCC)  [address] [facility]    vilazodone hcl 40 MG TABS      2. Trapezius muscle spasm  cyclobenzaprine (FLEXERIL) 10 MG tablet      3. Essential hypertension with goal blood pressure less than 140/90        4. Attention deficit disorder (ADD) without hyperactivity  [address]      5. Mood disorder [facility])  [address]      6. Chronic nonseasonal allergic rhinitis due to pollen  montelukast (SINGULAIR) 10 MG tablet      7. Type 2 diabetes mellitus with chronic kidney disease, with long-term current use of insulin, unspecified CKD stage ([address]            Reviewed medications and side effects in detail    Try increasing the viibryd to 40 mg.      Her other chronic conditions are stable at this time.   She is stable on the current treatment and tolerating it well. No significant sides effects. Will not adjust therapy at this time, unless noted above. We will continue to monitor for any problems. Medications refilled and lab work has been ordered where needed. Reviewed medications are explained including any potential interactions or side effects in detail. The patient's questions were answered. She  understands the above and has no further questions. Further workup and treatment should be done if symptoms persist, worsen or new symptoms occur. She  will call to notify us of any problems, complications or worsening symptoms. Follow-up and Dispositions    Return in about 4 weeks (around 8/18/2022). There are no Patient Instructions on file for this visit. (Some details in this note may have been created with speech-recognition software. Some errors in speech recognition may have occurred.    Most of those have been corrected but some may have been missed.)         Kacey Garcia MD  [facility]  [address] [place] [address]  Phone [phone]

## 2022-08-08 ENCOUNTER — OFFICE VISIT (OUTPATIENT)
Dept: ONCOLOGY | Age: 67
End: 2022-08-08
Payer: MEDICARE

## 2022-08-08 ENCOUNTER — HOSPITAL ENCOUNTER (OUTPATIENT)
Dept: LAB | Age: 67
Discharge: HOME | End: 2022-08-11
Payer: MEDICARE

## 2022-08-08 VITALS
HEART RATE: 67 BPM | TEMPERATURE: 97.8 F | DIASTOLIC BLOOD PRESSURE: 66 MMHG | WEIGHT: 141 LBS | OXYGEN SATURATION: 95 % | BODY MASS INDEX: 27.68 KG/M2 | RESPIRATION RATE: 14 BRPM | HEIGHT: 60 IN | SYSTOLIC BLOOD PRESSURE: 124 MMHG

## 2022-08-08 DIAGNOSIS — D72.9 NEUTROPHILIA: ICD-10-CM

## 2022-08-08 DIAGNOSIS — D72.9 NEUTROPHILIA: Primary | ICD-10-CM

## 2022-08-08 LAB
ALBUMIN SERPL-MCNC: 3.2 G/DL (ref 3.2–4.6)
ALBUMIN/GLOB SERPL: 0.9 {RATIO} (ref 1.2–3.5)
ALP SERPL-CCNC: 119 U/L (ref 50–136)
ALT SERPL-CCNC: 31 U/L (ref 12–65)
ANION GAP SERPL CALC-SCNC: 6 MMOL/L (ref 7–16)
AST SERPL-CCNC: 12 U/L (ref 15–37)
BASOPHILS # BLD: 0 K/UL (ref 0–0.2)
BASOPHILS NFR BLD: 0 % (ref 0–2)
BILIRUB SERPL-MCNC: 0.4 MG/DL (ref 0.2–1.1)
BUN SERPL-MCNC: 11 MG/DL (ref 8–23)
CALCIUM SERPL-MCNC: 9.1 MG/DL (ref 8.3–10.4)
CHLORIDE SERPL-SCNC: 102 MMOL/L (ref 98–107)
CO2 SERPL-SCNC: 30 MMOL/L (ref 21–32)
CREAT SERPL-MCNC: 0.8 MG/DL (ref 0.6–1)
DIFFERENTIAL METHOD BLD: ABNORMAL
EOSINOPHIL # BLD: 0.1 K/UL (ref 0–0.8)
EOSINOPHIL NFR BLD: 1 % (ref 0.5–7.8)
ERYTHROCYTE [DISTWIDTH] IN BLOOD BY AUTOMATED COUNT: 13.6 % (ref 11.9–14.6)
GLOBULIN SER CALC-MCNC: 3.7 G/DL (ref 2.3–3.5)
GLUCOSE SERPL-MCNC: 279 MG/DL (ref 65–100)
HCT VFR BLD AUTO: 42.7 % (ref 35.8–46.3)
HGB BLD-MCNC: 14 G/DL (ref 11.7–15.4)
IMM GRANULOCYTES # BLD AUTO: 0 K/UL (ref 0–0.5)
IMM GRANULOCYTES NFR BLD AUTO: 0 % (ref 0–5)
LYMPHOCYTES # BLD: 3.7 K/UL (ref 0.5–4.6)
LYMPHOCYTES NFR BLD: 26 % (ref 13–44)
MCH RBC QN AUTO: 27.8 PG (ref 26.1–32.9)
MCHC RBC AUTO-ENTMCNC: 32.8 G/DL (ref 31.4–35)
MCV RBC AUTO: 84.9 FL (ref 79.6–97.8)
MONOCYTES # BLD: 0.7 K/UL (ref 0.1–1.3)
MONOCYTES NFR BLD: 5 % (ref 4–12)
NEUTS SEG # BLD: 9.7 K/UL (ref 1.7–8.2)
NEUTS SEG NFR BLD: 68 % (ref 43–78)
NRBC # BLD: 0 K/UL (ref 0–0.2)
PLATELET # BLD AUTO: 288 K/UL (ref 150–450)
PMV BLD AUTO: 8.1 FL (ref 9.4–12.3)
POTASSIUM SERPL-SCNC: 3 MMOL/L (ref 3.5–5.1)
PROT SERPL-MCNC: 6.9 G/DL (ref 6.3–8.2)
RBC # BLD AUTO: 5.03 M/UL (ref 4.05–5.2)
SODIUM SERPL-SCNC: 138 MMOL/L (ref 136–145)
WBC # BLD AUTO: 14.2 K/UL (ref 4.3–11.1)

## 2022-08-08 PROCEDURE — 1123F ACP DISCUSS/DSCN MKR DOCD: CPT | Performed by: INTERNAL MEDICINE

## 2022-08-08 PROCEDURE — 99214 OFFICE O/P EST MOD 30 MIN: CPT | Performed by: INTERNAL MEDICINE

## 2022-08-08 PROCEDURE — 80053 COMPREHEN METABOLIC PANEL: CPT

## 2022-08-08 PROCEDURE — 36415 COLL VENOUS BLD VENIPUNCTURE: CPT

## 2022-08-08 PROCEDURE — 85025 COMPLETE CBC W/AUTO DIFF WBC: CPT

## 2022-08-08 ASSESSMENT — PATIENT HEALTH QUESTIONNAIRE - PHQ9
SUM OF ALL RESPONSES TO PHQ QUESTIONS 1-9: 1
1. LITTLE INTEREST OR PLEASURE IN DOING THINGS: 0
2. FEELING DOWN, DEPRESSED OR HOPELESS: 1
SUM OF ALL RESPONSES TO PHQ QUESTIONS 1-9: 1
SUM OF ALL RESPONSES TO PHQ9 QUESTIONS 1 & 2: 1

## 2022-08-08 NOTE — PATIENT INSTRUCTIONS
Patient Instructions from Today's Visit    Reason for Visit:  Follow up visit     Plan:  -We would recommend a bone marrow biopsy    Follow Up:  As Scheduled    Recent Lab Results:  Hospital Outpatient Visit on 08/08/2022   Component Date Value Ref Range Status    WBC 08/08/2022 14.2 (A) 4.3 - 11.1 K/uL Final    RBC 08/08/2022 5.03  4.05 - 5.2 M/uL Final    Hemoglobin 08/08/2022 14.0  11.7 - 15.4 g/dL Final    Hematocrit 08/08/2022 42.7  35.8 - 46.3 % Final    MCV 08/08/2022 84.9  79.6 - 97.8 FL Final    MCH 08/08/2022 27.8  26.1 - 32.9 PG Final    MCHC 08/08/2022 32.8  31.4 - 35.0 g/dL Final    RDW 08/08/2022 13.6  11.9 - 14.6 % Final    Platelets 34/39/6349 288  150 - 450 K/uL Final    MPV 08/08/2022 8.1 (A) 9.4 - 12.3 FL Final    nRBC 08/08/2022 0.00  0.0 - 0.2 K/uL Final    **Note: Absolute NRBC parameter is now reported with Hemogram**    Seg Neutrophils 08/08/2022 68  43 - 78 % Final    Lymphocytes 08/08/2022 26  13 - 44 % Final    Monocytes 08/08/2022 5  4.0 - 12.0 % Final    Eosinophils % 08/08/2022 1  0.5 - 7.8 % Final    Basophils 08/08/2022 0  0.0 - 2.0 % Final    Immature Granulocytes 08/08/2022 0  0.0 - 5.0 % Final    Segs Absolute 08/08/2022 9.7 (A) 1.7 - 8.2 K/UL Final    Absolute Lymph # 08/08/2022 3.7  0.5 - 4.6 K/UL Final    Absolute Mono # 08/08/2022 0.7  0.1 - 1.3 K/UL Final    Absolute Eos # 08/08/2022 0.1  0.0 - 0.8 K/UL Final    Basophils Absolute 08/08/2022 0.0  0.0 - 0.2 K/UL Final    Absolute Immature Granulocyte 08/08/2022 0.0  0.0 - 0.5 K/UL Final    Differential Type 08/08/2022 AUTOMATED    Final    Sodium 08/08/2022 138  136 - 145 mmol/L Final    Potassium 08/08/2022 3.0 (A) 3.5 - 5.1 mmol/L Final    Chloride 08/08/2022 102  98 - 107 mmol/L Final    CO2 08/08/2022 30  21 - 32 mmol/L Final    Anion Gap 08/08/2022 6 (A) 7 - 16 mmol/L Final    Glucose 08/08/2022 279 (A) 65 - 100 mg/dL Final    BUN 08/08/2022 11  8 - 23 MG/DL Final    Creatinine 08/08/2022 0.80  0.6 - 1.0 MG/DL Final GFR  08/08/2022 >60  >60 ml/min/1.73m2 Final    GFR Non- 08/08/2022 >60  >60 ml/min/1.73m2 Final    Comment:      Estimated GFR is calculated using the Modification of Diet in Renal Disease (MDRD) Study equation, reported for both  Americans (GFRAA) and non- Americans (GFRNA), and normalized to 1.73m2 body surface area. The physician must decide which value applies to the patient. The MDRD study equation should only be used in individuals age 25 or older. It has not been validated for the following: pregnant women, patients with serious comorbid conditions,or on certain medications, or persons with extremes of body size, muscle mass, or nutritional status. Calcium 08/08/2022 9.1  8.3 - 10.4 MG/DL Final    Total Bilirubin 08/08/2022 0.4  0.2 - 1.1 MG/DL Final    ALT 08/08/2022 31  12 - 65 U/L Final    AST 08/08/2022 12 (A) 15 - 37 U/L Final    Alk Phosphatase 08/08/2022 119  50 - 136 U/L Final    Total Protein 08/08/2022 6.9  6.3 - 8.2 g/dL Final    Albumin 08/08/2022 3.2  3.2 - 4.6 g/dL Final    Globulin 08/08/2022 3.7 (A) 2.3 - 3.5 g/dL Final    Albumin/Globulin Ratio 08/08/2022 0.9 (A) 1.2 - 3.5   Final         Treatment Summary has been discussed and given to patient: n/a        -------------------------------------------------------------------------------------------------------------------  Please call our office at [phone] if you have any  of the following symptoms:   Fever of 100.5 or greater  Chills  Shortness of breath  Swelling or pain in one leg    After office hours an answering service is available and will contact a provider for emergencies or if you are experiencing any of the above symptoms. Patient did express an interest in My Chart. My Chart log in information explained on the after visit summary printout at the [facility] [address] [facility].     LILIAN Alston, RN  Hematology Navigator  [address] [place] [address]    Navigator is available by phone Monday through Friday 8 am to 4 pm.    If you need assistance after 4pm, or on the weekend please call [phone]. The answering service is available 24 hours a day, 7 days a week.

## 2022-08-08 NOTE — PROGRESS NOTES
Data Source: [facility], [facility] record. 8/8/2022    12:17 PM    [place] [id number]    75 y.o. Patient Encounter: [address] Visit      Heme Diagnosis:  Neutrophilia  Heme History (Copied from prior):   78  female ex-smoker history of DM, CKD, anxiety/depression, peptic ulcer disease, leiomyosarcoma of arm excised in 1998, hypertension/dyslipidemia, colon cancer status post partial colectomy 2013 (without need for adjuvant therapy), GERD, COPD, KATY/BSO, lumbar fusion, hernia repair, spinal cord implant and removal 2005, cervical fusion with discectomy 2011, cholecystectomy, now referred to us for persistent leukocytosis. Patient was seen by hematology myself 7/14 for same, first noticed in 4/2011 when her white count was 13.1. She had denied any infection or steroid exposure at the time. Work-up 7/1/14 included peripheral blood flow cytometry, BCR ABL translocation and JAK2 V617F testing which came back unremarkable. Patient was scheduled to follow-up with us in a few weeks however subsequently was lost to follow-up. She represents to establish care. She denies any recent infections or steroid use. Denies any B symptoms. Reports scheduled for eye surgery in the near future. Interval History:  8/8/2022: Reports doing reasonably. Some healing lacerations on her forearms which she reports from her kitten. Blood work with mild neutrophilia, no other cell lines unremarkable. Unlikely bone marrow process however patient agreeable to proceeding with bone marrow biopsy for completion. We will see her in 3 months time with repeat blood work and bone marrow biopsy results. REVIEW OF SYSTEMS:  As mentioned above, all other systems were reviewed in full and are negative. Past Medical History:   Diagnosis Date    Arthritis     does not know kind-sees rheumatologist/ spine/ LLE/ L ft/toes; pt reports psoriatic    Bronchitis ?  Aug 2013    no hospitalization    CAD (coronary artery disease) 2011    EF= 65 %  left anterior descending coronary stenosis is 30 %. 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 Antoine Moctezuma    Mixed hyperlipidemia 3/15/2013    intolerant to medications    Nicotine vapor product user     Psychiatric disorder     depression and anxiety no med    PUD (peptic ulcer disease)     gastric    Renal insufficiency 3/15/2013    Has seen Dr. Toribio Melvin in the past.    Scoliosis, congenital     Thyroid nodule 3/15/2013    Type II or unspecified type diabetes mellitus without mention of complication, not stated as uncontrolled 3/15/2013      Is adjusting insulin at this time.         Past Surgical History:   Procedure Laterality Date    BACK SURGERY  2005    spinal cord implant    BACK SURGERY  2005    spinal cord implant removal    BACK SURGERY  2005    nerve embedded in muscle-resection R wrist    CERVICAL FUSION  10/11    w/ discectomy and cage - Dr. Hay Ser    hernia repair    [facility] or 75    spinal fusion lumbar    OVARY REMOVAL  1974    left partial    MA ABDOMEN SURGERY PROC UNLISTED  1996    colon resection    KATY AND BSO (CERVIX REMOVED)  1995    TOTAL KNEE ARTHROPLASTY  5/11    partial - Dr. Melanie Hsieh, Jan 2018       Current Outpatient Medications   Medication Sig Dispense Refill montelukast (SINGULAIR) 10 MG tablet Take 1 tablet by mouth in the morning. 90 tablet 3    cyclobenzaprine (FLEXERIL) 10 MG tablet TAKE ONE TABLET BY MOUTH THREE TIMES A DAY AS NEEDED FOR MUSCLE SPASMS 90 tablet 3    vilazodone hcl 40 MG TABS 1 po qam with food for depression 30 tablet 5    TRESIBA FLEXTOUCH 200 UNIT/ML SOPN INJECT 50 UNITS UNDER THE SKIN DAILY 18 mL 5    clopidogrel (PLAVIX) 75 MG tablet Take 75 mg by mouth daily      pantoprazole (PROTONIX) 40 MG tablet Take 1 Tablet by mouth BID For stomach - instead of prilosec( failed priolosec and nexium and prevacid) - Oral 180 tablet 3    amLODIPine (NORVASC) 5 MG tablet Take 1 Tablet by mouth daily.  ( instead of tribenzor with benicar hct) - Oral 90 tablet 5    olmesartan (BENICAR) 40 MG tablet 1 p.o. daily for blood pressure instead of olmesartan HCTZ 90 tablet 1    metoprolol succinate (TOPROL XL) 50 MG extended release tablet TAKE ONE TABLET BY MOUTH EVERY DAY 90 tablet 1    lubiprostone (AMITIZA) 24 MCG capsule Take 24 mcg by mouth      rosuvastatin (CRESTOR) 20 MG tablet Take 1 tablet by mouth nightly 30 tablet 3    albuterol sulfate  (90 Base) MCG/ACT inhaler INHALE TWO PUFFS BY MOUTH EVERY 6 HOURS AS NEEDED FOR WHEEZING      levocetirizine (XYZAL) 5 MG tablet Take 5 mg by mouth      mupirocin (BACTROBAN) 2 % nasal ointment by Nasal route 2 times daily      naloxegol (MOVANTIK) 12.5 MG TABS tablet Take 25 mg by mouth every morning (before breakfast)      naloxone 4 MG/0.1ML LIQD nasal spray 4 mg      promethazine (PHENERGAN) 25 MG tablet Take 25 mg by mouth every 6 hours as needed      traZODone (DESYREL) 100 MG tablet TAKE 2-3 TABLETS BY MOUTH AT BEDTIME      clobetasol (TEMOVATE) 0.05 % ointment       Vilazodone HCl 10 & 20 MG KIT Take as directed with food and then switch to the 20 mg tablets (Patient not taking: Reported on 8/8/2022) 1 kit 0    vilazodone HCl (VILAZODONE HCL) 20 MG TABS 1 po qam with food for depression after finishing starter pack - failed multiple meds (Patient not taking: Reported on 2022) 30 tablet 5    aspirin 325 MG tablet Take 325 mg by mouth daily      morphine (MSIR) 15 MG tablet Take 15 mg by mouth every 6 hours as needed. No current facility-administered medications for this visit. Social History     Socioeconomic History    Marital status: Unknown     Spouse name: None    Number of children: None    Years of education: None    Highest education level: None   Tobacco Use    Smoking status: Some Days     Packs/day: 1.00     Types: Cigarettes     Last attempt to quit: 2021     Years since quittin.9    Smokeless tobacco: Former     Quit date: 2018    Tobacco comments:     Quit smoking: quit   Vaping Use    Vaping Use: Never used   Substance and Sexual Activity    Alcohol use: No    Drug use: No     Types: Prescription       Family History   Problem Relation Age of Onset    Heart Disease Sister     Lung Disease Sister     Alcohol Abuse Mother     Alcohol Abuse Father     Diabetes Paternal Grandfather     Heart Disease Brother     Lung Disease Sister        Allergies   Allergen Reactions    Latex Rash     Skin raw    Ketamine      Other reaction(s): Altered Mental State-Intolerance    Acetaminophen Other (See Comments)     Pt states she can take medication    Adhesive Tape Other (See Comments)     Other reaction(s): Unknown-Unspecified  Bruising, burning  Bruising, burning      Diclofenac Sodium Other (See Comments)    Diclofenac-Misoprostol Nausea Only    Misoprostol Other (See Comments)    Nsaids Other (See Comments)     Other reaction(s): Other- (not listed) - Allergy  Not to take due to kidneys blood and pus in urine. Other reaction(s): Other- (not listed) - Allergy  NA  NA      Ropinirole Other (See Comments)     Keep pt awake    Varenicline Other (See Comments)     \"I had violent outbursts\"    Gabapentin Palpitations     Other reaction(s):  Other- (not listed) - Allergy  Upset stomach PHYSICAL EXAMINATION:  General Appearance: Healthy appearing patient in no acute distress  Vitals reviewed. /66 (Site: Left Upper Arm, Position: Standing, Cuff Size: Medium Adult)   Pulse 67   Temp 97.8 °F (36.6 °C) (Oral)   Resp 14   Ht 5' (1.524 m)   Wt 141 lb (64 kg)   SpO2 95%   BMI 27.54 kg/m²   HEENT: No oral or pharyngeal masses, ulceration or thrush noted, no sinus tenderness. Neck is supple with no thyromegaly or JVD noted. Lymph Nodes: No lymphadenopathy noted in the occipital, pre and post auricular, cervical, supra and infraclavicular, axillary, epitrochlear, inguinal, and popliteal region. Breasts: No palpable masses, nipple discharge or skin retraction  Lungs/Thorax: Clear to auscultation, no accessory muscles of respiration being used. Heart: Regular rate and rhythm, normal S1, S2, no appreciable murmurs, rubs, gallops  Abdomen: Soft, nontender, bowel sounds present, no appreciable hepatosplenomegaly, no palpable masses  Extremeties: Good pulses bilaterally, no peripheral edema. Skin: Normal skin tone with no rash, petechiae, ecchymosis noted. Musculoskeletal: No pain on palpation over bony prominence, no edema, no evidence of gout, no joint or bony deformity  Neurologic: Grossly intact        LABS/IMAGING:    Lab Results   Component Value Date/Time    WBC 14.2 08/08/2022 11:02 AM    HGB 14.0 08/08/2022 11:02 AM    HCT 42.7 08/08/2022 11:02 AM     08/08/2022 11:02 AM    MCV 84.9 08/08/2022 11:02 AM       Lab Results   Component Value Date/Time     08/08/2022 11:02 AM    K 3.0 08/08/2022 11:02 AM     08/08/2022 11:02 AM    CO2 30 08/08/2022 11:02 AM    BUN 11 08/08/2022 11:02 AM    GFRAA >60 08/08/2022 11:02 AM    GLOB 3.7 08/08/2022 11:02 AM    ALT 31 08/08/2022 11:02 AM             Above results reviewed with patient.     ASSESSMENT:  78  female ex-smoker history of DM, CKD, anxiety/depression, peptic ulcer disease, leiomyosarcoma of arm excised in 1998, hypertension/dyslipidemia, colon cancer status post partial colectomy 2013 (without need for adjuvant therapy), GERD, COPD, KATY/BSO, lumbar fusion, hernia repair, spinal cord implant and removal 2005, cervical fusion with discectomy 2011, cholecystectomy, now referred to us for persistent leukocytosis. Patient was seen by hematology myself 7/14 for same, first noticed in 4/2011 when her white count was 13.1. She had denied any infection or steroid exposure at the time. Work-up 7/1/14 included peripheral blood flow cytometry, BCR ABL translocation and JAK2 V617F testing which came back unremarkable. Patient was scheduled to follow-up with us in a few weeks however subsequently was lost to follow-up. She represents to establish care. She denies any recent infections or steroid use. Denies any B symptoms. Reports scheduled for eye surgery in the near future. We will work-up as below:    2/25/2022: Today reports some right knee discomfort, sees Ortho for this. Labs from previous visit as well as today with leukocytosis having resolved without further neutrophilia. Other cell lines normal range. Her peripheral blood smear review also came back unremarkable. Other labs included normal ESR, RF, anti-CCP, and IVAN. Given resolution of neutrophilia, her bone marrow biopsy has been deferred. We will simply monitor her counts for now, we will see her back in 4 months time. 8/8/2022: Reports doing reasonably. Some healing lacerations on her forearms which she reports from her kitten. Blood work with mild neutrophilia, no other cell lines unremarkable. Unlikely bone marrow process however patient agreeable to proceeding with bone marrow biopsy for completion. We will see her in 3 months time with repeat blood work and bone marrow biopsy results. 1. Leukocytosis w neutrophilia    PLAN:  As above, will proceed w BM biopsy.      RTC in 3 months w labs, bm biopsy    Thank you Dr Heidi Whiting for allowing us to paticipate in care of this pleasant patient.  Please call w/ any scout Lopez MD  [address] [place] [address],[address]  Hematology Oncology  [address] [place] [address]  Office : [phone]  Fax : [phone]

## 2022-08-16 ENCOUNTER — OFFICE VISIT (OUTPATIENT)
Dept: FAMILY MEDICINE CLINIC | Facility: CLINIC | Age: 67
End: 2022-08-16
Payer: MEDICARE

## 2022-08-16 ENCOUNTER — NURSE ONLY (OUTPATIENT)
Dept: FAMILY MEDICINE CLINIC | Facility: CLINIC | Age: 67
End: 2022-08-16

## 2022-08-16 VITALS
HEART RATE: 70 BPM | BODY MASS INDEX: 27.03 KG/M2 | HEIGHT: 61 IN | DIASTOLIC BLOOD PRESSURE: 74 MMHG | RESPIRATION RATE: 16 BRPM | OXYGEN SATURATION: 96 % | WEIGHT: 143.2 LBS | SYSTOLIC BLOOD PRESSURE: 147 MMHG | TEMPERATURE: 98 F

## 2022-08-16 DIAGNOSIS — J30.89 CHRONIC NONSEASONAL ALLERGIC RHINITIS DUE TO POLLEN: ICD-10-CM

## 2022-08-16 DIAGNOSIS — K21.00 GASTROESOPHAGEAL REFLUX DISEASE WITH ESOPHAGITIS, UNSPECIFIED WHETHER HEMORRHAGE: ICD-10-CM

## 2022-08-16 DIAGNOSIS — Z79.4 TYPE 2 DIABETES MELLITUS WITHOUT COMPLICATION, WITH LONG-TERM CURRENT USE OF INSULIN (HCC): ICD-10-CM

## 2022-08-16 DIAGNOSIS — F39 MOOD DISORDER (HCC): Primary | ICD-10-CM

## 2022-08-16 DIAGNOSIS — J44.9 CHRONIC OBSTRUCTIVE PULMONARY DISEASE, UNSPECIFIED COPD TYPE (HCC): ICD-10-CM

## 2022-08-16 DIAGNOSIS — E11.21 TYPE 2 DIABETES WITH NEPHROPATHY (HCC): ICD-10-CM

## 2022-08-16 DIAGNOSIS — F32.4 MAJOR DEPRESSIVE DISORDER IN PARTIAL REMISSION, UNSPECIFIED WHETHER RECURRENT (HCC): ICD-10-CM

## 2022-08-16 DIAGNOSIS — F11.20 CHRONIC NARCOTIC DEPENDENCE (HCC): ICD-10-CM

## 2022-08-16 DIAGNOSIS — E11.22 TYPE 2 DIABETES MELLITUS WITH CHRONIC KIDNEY DISEASE, WITH LONG-TERM CURRENT USE OF INSULIN, UNSPECIFIED CKD STAGE (HCC): ICD-10-CM

## 2022-08-16 DIAGNOSIS — Q67.5 SCOLIOSIS, CONGENITAL: ICD-10-CM

## 2022-08-16 DIAGNOSIS — Z79.4 TYPE 2 DIABETES MELLITUS WITH CHRONIC KIDNEY DISEASE, WITH LONG-TERM CURRENT USE OF INSULIN, UNSPECIFIED CKD STAGE (HCC): ICD-10-CM

## 2022-08-16 DIAGNOSIS — E11.9 TYPE 2 DIABETES MELLITUS WITHOUT COMPLICATION, WITH LONG-TERM CURRENT USE OF INSULIN (HCC): ICD-10-CM

## 2022-08-16 DIAGNOSIS — E11.69 TYPE 2 DIABETES MELLITUS WITH OTHER SPECIFIED COMPLICATION, WITH LONG-TERM CURRENT USE OF INSULIN (HCC): ICD-10-CM

## 2022-08-16 DIAGNOSIS — I25.10 CORONARY ARTERY DISEASE INVOLVING NATIVE HEART, UNSPECIFIED VESSEL OR LESION TYPE, UNSPECIFIED WHETHER ANGINA PRESENT: ICD-10-CM

## 2022-08-16 DIAGNOSIS — F33.1 MAJOR DEPRESSIVE DISORDER, RECURRENT EPISODE, MODERATE (HCC): ICD-10-CM

## 2022-08-16 DIAGNOSIS — I10 ESSENTIAL HYPERTENSION WITH GOAL BLOOD PRESSURE LESS THAN 140/90: ICD-10-CM

## 2022-08-16 DIAGNOSIS — Z79.4 TYPE 2 DIABETES MELLITUS WITH OTHER SPECIFIED COMPLICATION, WITH LONG-TERM CURRENT USE OF INSULIN (HCC): ICD-10-CM

## 2022-08-16 DIAGNOSIS — R80.9 MICROALBUMINURIA: ICD-10-CM

## 2022-08-16 LAB
EST. AVERAGE GLUCOSE BLD GHB EST-MCNC: 252 MG/DL
HBA1C MFR BLD: 10.4 % (ref 4.8–5.6)

## 2022-08-16 PROCEDURE — 1123F ACP DISCUSS/DSCN MKR DOCD: CPT | Performed by: FAMILY MEDICINE

## 2022-08-16 PROCEDURE — 99214 OFFICE O/P EST MOD 30 MIN: CPT | Performed by: FAMILY MEDICINE

## 2022-08-16 PROCEDURE — 3046F HEMOGLOBIN A1C LEVEL >9.0%: CPT | Performed by: FAMILY MEDICINE

## 2022-08-16 RX ORDER — LEVOCETIRIZINE DIHYDROCHLORIDE 5 MG/1
TABLET, FILM COATED ORAL
Qty: 90 TABLET | Refills: 3 | Status: SHIPPED | OUTPATIENT
Start: 2022-08-16

## 2022-08-16 ASSESSMENT — ENCOUNTER SYMPTOMS
ABDOMINAL PAIN: 0
CONSTIPATION: 0
RHINORRHEA: 0
COUGH: 0
VOMITING: 0
SHORTNESS OF BREATH: 0
WHEEZING: 0
NAUSEA: 0
DIARRHEA: 0

## 2022-08-16 ASSESSMENT — PATIENT HEALTH QUESTIONNAIRE - PHQ9
8. MOVING OR SPEAKING SO SLOWLY THAT OTHER PEOPLE COULD HAVE NOTICED. OR THE OPPOSITE, BEING SO FIGETY OR RESTLESS THAT YOU HAVE BEEN MOVING AROUND A LOT MORE THAN USUAL: 0
4. FEELING TIRED OR HAVING LITTLE ENERGY: 3
7. TROUBLE CONCENTRATING ON THINGS, SUCH AS READING THE NEWSPAPER OR WATCHING TELEVISION: 1
2. FEELING DOWN, DEPRESSED OR HOPELESS: 0
SUM OF ALL RESPONSES TO PHQ QUESTIONS 1-9: 9
5. POOR APPETITE OR OVEREATING: 1
1. LITTLE INTEREST OR PLEASURE IN DOING THINGS: 3
3. TROUBLE FALLING OR STAYING ASLEEP: 1
10. IF YOU CHECKED OFF ANY PROBLEMS, HOW DIFFICULT HAVE THESE PROBLEMS MADE IT FOR YOU TO DO YOUR WORK, TAKE CARE OF THINGS AT HOME, OR GET ALONG WITH OTHER PEOPLE: 1
SUM OF ALL RESPONSES TO PHQ QUESTIONS 1-9: 9
SUM OF ALL RESPONSES TO PHQ9 QUESTIONS 1 & 2: 3
6. FEELING BAD ABOUT YOURSELF - OR THAT YOU ARE A FAILURE OR HAVE LET YOURSELF OR YOUR FAMILY DOWN: 0
SUM OF ALL RESPONSES TO PHQ QUESTIONS 1-9: 9
SUM OF ALL RESPONSES TO PHQ QUESTIONS 1-9: 9
9. THOUGHTS THAT YOU WOULD BE BETTER OFF DEAD, OR OF HURTING YOURSELF: 0

## 2022-08-16 NOTE — PROGRESS NOTES
HISTORY OF PRESENT ILLNESS  Chief Complaint   Patient presents with    Depression     Phq9-9; pt states her medication is working but makes her emotionless       pt states her medication is working but makes her emotionless. Can eat an ice cream and not go up but the fruits make it go way up. Not as active as usual.  Will not get up and do things because she hurts. Previously said, \" Got really upset over something really small and if Derick didn't intervene would have really cause a problem. Getting more upset than she needs to about things. Thought that it was working until she got real upset that episode. \"    Can get shakey even after the insulin    Gets hungry before she goes to bed. Will scramble some eggs. Plans to do a Bone Marrow bx. Back and knees (R) hurt    Diabetes Mellitus: Patient presents for follow up of diabetes. Symptoms: taking medications as instructed, no medication side effects noted, no TIA's, no chest pain on exertion, no dyspnea on exertion, no swelling of ankles. Symptoms have reasonably well controlled. Patient denies nausea, polydipsia, polyuria, visual disturbances, and vomiting. Evaluation to date has been included below. Home sugars: BGs are high in the morning, BGs are high in the evening, BGs are high around dinner. Treatment to date: medications. Diabetic issues reviewed with her: low cholesterol diet, weight control and daily exercise discussed.   Key Antihyperglycemic Medications            empagliflozin (JARDIANCE) 10 MG tablet (Taking)    Si po qam for diabetes    TRESIBA FLEXTOUCH 200 UNIT/ML SOPN (Taking)    Sig: INJECT 50 UNITS UNDER THE SKIN DAILY           Lab Results   Component Value Date    LABA1C 10.4 (H) 2022     Lab Results   Component Value Date     2022      Lab Results   Component Value Date    CREATININE 0.80 2022      No results found for: Ned Saenz   Wt Readings from Last 3 Encounters:   22 143 lb 3.2 oz (65 kg)   08/08/22 141 lb (64 kg)   07/21/22 138 lb 9.6 oz (62.9 kg)      BP Readings from Last 3 Encounters:   08/16/22 (!) 147/74   08/08/22 124/66   07/21/22 (!) 140/59      PHQ-9  Little interest or pleasure in doing things: Nearly every day  Feeling down, depressed, or hopeless: Not at all  Trouble falling or staying asleep, or sleeping too much: Several days  Feeling tired or having little energy: Nearly every day  Poor appetite or overeating: Several days  Feeling bad about yourself - or that you are a failure or have let yourself or your family down: Not at all  Trouble concentrating on things, such as reading the newspaper or watching television: Several days  Moving or speaking so slowly that other people could have noticed. Or the opposite - being so fidgety or restless that you have been moving around a lot more than usual: Not at all  Thoughts that you would be better off dead, or of hurting yourself in some way: Not at all  PHQ-9 Total Score: 9  If you checked off any problems, how difficult have these problems made it for you to do your work, take care of things at home, or get along with other people?: Somewhat difficult      HPI  Diabetes   The history is provided by the patient. This is a chronic problem. The current  episode started more than 1 week ago. The problem occurs daily. The problem has not changed since onset. Pertinent negatives include no chest pain, no abdominal pain, no headaches and no shortness of breath. The symptoms are aggravated by eating. The  symptoms are relieved by medications. Hypertension    The history is provided by the patient. This is a chronic problem. The  current episode started more than 1 week ago. The problem  has not changed since onset. Pertinent negatives  include no chest pain, no palpitations, no anxiety, no malaise/fatigue, no blurred vision, no headaches, no tinnitus, no neck pain, no peripheral edema, no dizziness, no shortness of breath, no nausea and no vomiting. Depression   The history is provided by the patient. This is a recurrent problem. The current episode started more than 1 week ago. The  problem occurs daily. The problem has been gradually  worsening. Pertinent negatives include no chest pain,  no abdominal pain, no headaches and no shortness of breath. The symptoms are aggravated by stress. Nothing relieves the symptoms. She has tried rest for the symptoms. The treatment provided no relief. Review of Systems   Constitutional:  Negative for activity change, appetite change, chills, fatigue and fever. HENT:  Negative for congestion and rhinorrhea. Respiratory:  Negative for cough, shortness of breath and wheezing. Gastrointestinal:  Negative for abdominal pain, constipation, diarrhea, nausea and vomiting. Genitourinary:  Positive for frequency. Negative for dysuria. Musculoskeletal:  Positive for joint swelling and myalgias. Negative for arthralgias, back pain, gait problem and neck pain. Skin:  Negative for color change, rash and wound. Neurological:  Negative for dizziness, tremors, weakness, numbness and headaches. Psychiatric/Behavioral:  Negative for dysphoric mood. The patient is not nervous/anxious.      Patient Active Problem List   Diagnosis    Back pain    Renal insufficiency    PUD (peptic ulcer disease)    Mixed hyperlipidemia    Neutrophilia    DM (diabetes mellitus) ([facility] [address]    Tremor    Melanocytic nevus of trunk    Falls frequently    Bacteremia    Hypokalemia    Coronary artery calcification    Major depressive disorder, recurrent episode, moderate (HCC)    Chronic pain    Hypertension    Osteoarthritis of both knees    Murmur, cardiac    At risk for allergic reaction to medication    Isolated proteinuria without specific morphologic lesion    Arthritis of left knee    History of colon cancer    GERD (gastroesophageal reflux disease)    MDD (major depressive disorder)    Scoliosis, congenital    Tobacco abuse    Osteoarthritis    CAD (coronary artery disease)    Insomnia    Acute encephalopathy    Umbilical hernia without obstruction and without gangrene    Type 2 diabetes with nephropathy (HCC)    Cancer (HCC)    Chronic narcotic dependence (HCC)    Constipation    Preventative health care    Edema of both legs    Thyroid nodule    RBBB    Post-traumatic osteoarthritis of right knee    Attention deficit disorder (ADD) without hyperactivity    Type 2 diabetes mellitus with chronic kidney disease, with long-term current use of insulin (HCC)    Type 2 diabetes mellitus without complication, with long-term current use of insulin ([address]    Essential hypertension with goal blood pressure less than 140/90    Chronic obstructive pulmonary disease (HCC)    Microalbuminuria    Memory loss    Neuropathy    Peripheral artery occlusion (HCC)    Toe cyanosis    Paresthesia of both legs    Neural foraminal stenosis of lumbar spine    DDD (degenerative disc disease), lumbar    Pain of fifth toe    Scalp lesion    Right foot pain    Wheeze    Urethral stenosis    Mood disorder (HCC)    Chronic nonseasonal allergic rhinitis due to pollen    Trapezius muscle spasm         Blood pressure (!) 147/74, pulse 70, temperature 98 °F (36.7 °C), temperature source Temporal, resp. rate 16, height 5' 1\" (1.549 m), weight 143 lb 3.2 oz (65 kg), SpO2 96 %. Pain Scale: 5/10 Pain Location: Foot  Body mass index is 27.06 kg/m². Physical Exam  Vitals and nursing note reviewed. Constitutional:       General: She is not in acute distress. Appearance: Normal appearance. She is normal weight. She is not toxic-appearing. HENT:      Head: Normocephalic and atraumatic. Eyes:      General: Lids are normal.      Extraocular Movements: Extraocular movements intact. Conjunctiva/sclera: Conjunctivae normal.      Pupils: Pupils are equal.   Cardiovascular:      Rate and Rhythm: Normal rate and regular rhythm.       Heart sounds: Normal heart (XYZAL) 5 MG tablet          Reviewed medications and side effects in detail    Dany Vasques, TODD  [address]  [address] [facility]    [phone]      Her other chronic conditions are stable at this time. She is stable on the current treatment and tolerating it well. No significant sides effects. Will not adjust therapy at this time, unless noted above. We will continue to monitor for any problems. Medications refilled and lab work has been ordered where needed. Reviewed medications are explained including any potential interactions or side effects in detail. The patient's questions were answered. She  understands the above and has no further questions. Further workup and treatment should be done if symptoms persist, worsen or new symptoms occur. She  will call to notify us of any problems, complications or worsening symptoms. Follow-up and Dispositions    Return in about 3 months (around 11/16/2022) for labs today, EOV. There are no Patient Instructions on file for this visit. (Some details in this note may have been created with speech-recognition software. Some errors in speech recognition may have occurred.    Most of those have been corrected but some may have been missed.)         Saul Gray MD  [address] [facility] [address]  Phone [phone]

## 2022-08-22 ASSESSMENT — ENCOUNTER SYMPTOMS
COLOR CHANGE: 0
BACK PAIN: 0

## 2022-09-13 ENCOUNTER — TELEPHONE (OUTPATIENT)
Dept: FAMILY MEDICINE CLINIC | Facility: CLINIC | Age: 67
End: 2022-09-13

## 2022-09-14 NOTE — TELEPHONE ENCOUNTER
The patient has not viewed the following result(s) yet. Your A1c is elevated on your lab work. It is gone up to 10.4. Your A1c needs to come down to at least under 8.0 to prevent the ongoing damage to blood vessels and nerves. You need to closely follow your nutrition and blood sugars closely. Make sure you are increasing your physical activity. If it is not improved, we may need to adjust Therapy for diabetes. Continue your current medications. Other labs are normal. Make a follow-up appointment for 3 months. We need to hear from you before then if things are not improving. Make sure you are using your insulin regularly.

## 2022-09-28 ENCOUNTER — OFFICE VISIT (OUTPATIENT)
Dept: FAMILY MEDICINE CLINIC | Facility: CLINIC | Age: 67
End: 2022-09-28
Payer: MEDICARE

## 2022-09-28 VITALS
SYSTOLIC BLOOD PRESSURE: 127 MMHG | OXYGEN SATURATION: 96 % | WEIGHT: 142 LBS | BODY MASS INDEX: 26.81 KG/M2 | TEMPERATURE: 98 F | HEIGHT: 61 IN | HEART RATE: 70 BPM | RESPIRATION RATE: 16 BRPM | DIASTOLIC BLOOD PRESSURE: 64 MMHG

## 2022-09-28 DIAGNOSIS — F51.01 PRIMARY INSOMNIA: ICD-10-CM

## 2022-09-28 DIAGNOSIS — K59.03 DRUG-INDUCED CONSTIPATION: ICD-10-CM

## 2022-09-28 DIAGNOSIS — F11.20 CHRONIC NARCOTIC DEPENDENCE (HCC): ICD-10-CM

## 2022-09-28 DIAGNOSIS — E04.1 THYROID NODULE: ICD-10-CM

## 2022-09-28 DIAGNOSIS — J44.9 CHRONIC OBSTRUCTIVE PULMONARY DISEASE, UNSPECIFIED COPD TYPE (HCC): ICD-10-CM

## 2022-09-28 DIAGNOSIS — F33.1 MODERATE EPISODE OF RECURRENT MAJOR DEPRESSIVE DISORDER (HCC): ICD-10-CM

## 2022-09-28 DIAGNOSIS — L40.9 PSORIASIS: ICD-10-CM

## 2022-09-28 DIAGNOSIS — Z23 ENCOUNTER FOR IMMUNIZATION: ICD-10-CM

## 2022-09-28 DIAGNOSIS — L60.3: ICD-10-CM

## 2022-09-28 DIAGNOSIS — I10 ESSENTIAL HYPERTENSION WITH GOAL BLOOD PRESSURE LESS THAN 140/90: Primary | ICD-10-CM

## 2022-09-28 DIAGNOSIS — I10 PRIMARY HYPERTENSION: ICD-10-CM

## 2022-09-28 PROCEDURE — 1123F ACP DISCUSS/DSCN MKR DOCD: CPT | Performed by: FAMILY MEDICINE

## 2022-09-28 PROCEDURE — 99214 OFFICE O/P EST MOD 30 MIN: CPT | Performed by: FAMILY MEDICINE

## 2022-09-28 RX ORDER — LUBIPROSTONE 24 UG/1
24 CAPSULE, GELATIN COATED ORAL 2 TIMES DAILY WITH MEALS
Qty: 180 CAPSULE | Refills: 5 | Status: SHIPPED | OUTPATIENT
Start: 2022-09-28

## 2022-09-28 RX ORDER — VILAZODONE HYDROCHLORIDE 40 MG/1
40 TABLET ORAL DAILY
Qty: 90 TABLET | Refills: 3 | Status: SHIPPED | OUTPATIENT
Start: 2022-09-28

## 2022-09-28 RX ORDER — BLOOD SUGAR DIAGNOSTIC
STRIP MISCELLANEOUS
COMMUNITY
Start: 2022-09-19

## 2022-09-28 RX ORDER — TRAZODONE HYDROCHLORIDE 100 MG/1
TABLET ORAL
Qty: 90 TABLET | Refills: 5 | Status: SHIPPED | OUTPATIENT
Start: 2022-09-28

## 2022-09-28 RX ORDER — METOPROLOL SUCCINATE 50 MG/1
TABLET, EXTENDED RELEASE ORAL
Qty: 90 TABLET | Refills: 1 | Status: SHIPPED | OUTPATIENT
Start: 2022-09-28

## 2022-09-28 RX ORDER — OLMESARTAN MEDOXOMIL 40 MG/1
TABLET ORAL
Qty: 90 TABLET | Refills: 1 | Status: SHIPPED | OUTPATIENT
Start: 2022-09-28

## 2022-09-28 ASSESSMENT — PATIENT HEALTH QUESTIONNAIRE - PHQ9
7. TROUBLE CONCENTRATING ON THINGS, SUCH AS READING THE NEWSPAPER OR WATCHING TELEVISION: 3
3. TROUBLE FALLING OR STAYING ASLEEP: 3
2. FEELING DOWN, DEPRESSED OR HOPELESS: 3
10. IF YOU CHECKED OFF ANY PROBLEMS, HOW DIFFICULT HAVE THESE PROBLEMS MADE IT FOR YOU TO DO YOUR WORK, TAKE CARE OF THINGS AT HOME, OR GET ALONG WITH OTHER PEOPLE: 1
1. LITTLE INTEREST OR PLEASURE IN DOING THINGS: 3
4. FEELING TIRED OR HAVING LITTLE ENERGY: 3
SUM OF ALL RESPONSES TO PHQ QUESTIONS 1-9: 17
SUM OF ALL RESPONSES TO PHQ9 QUESTIONS 1 & 2: 6
SUM OF ALL RESPONSES TO PHQ QUESTIONS 1-9: 17
9. THOUGHTS THAT YOU WOULD BE BETTER OFF DEAD, OR OF HURTING YOURSELF: 0
6. FEELING BAD ABOUT YOURSELF - OR THAT YOU ARE A FAILURE OR HAVE LET YOURSELF OR YOUR FAMILY DOWN: 1
8. MOVING OR SPEAKING SO SLOWLY THAT OTHER PEOPLE COULD HAVE NOTICED. OR THE OPPOSITE, BEING SO FIGETY OR RESTLESS THAT YOU HAVE BEEN MOVING AROUND A LOT MORE THAN USUAL: 0
5. POOR APPETITE OR OVEREATING: 1

## 2022-09-28 NOTE — PROGRESS NOTES
HISTORY OF PRESENT ILLNESS  Chief Complaint   Patient presents with    Depression     Phq9-19       Added collegen and peptides and stool softeners. Was having large big stools that were painful    Does have spoon nails and sometimes when they start growing out on her toes they can cut the toe beside it. Nails splitting at times. Dr. Megan Dominguez found to small nodules on her thyroid over 10 years ago. Never did follow up on that. Derm froze some things on her scalp. Said that there was nothing that neede to be done for that. Hands are in bad shape from the psoriasis. Cracks and they don't heal well. Thought that her depression medication was working and still thinks that it does. Will get up and the night and start doing something and gets distracted and then doing something else and ... May be on 40 mg of viibryd. Eats a lot of popcorn. PHQ-9  Little interest or pleasure in doing things: Nearly every day  Feeling down, depressed, or hopeless: Nearly every day  Trouble falling or staying asleep, or sleeping too much: Nearly every day  Feeling tired or having little energy: Nearly every day  Poor appetite or overeating: Several days  Feeling bad about yourself - or that you are a failure or have let yourself or your family down: Several days  Trouble concentrating on things, such as reading the newspaper or watching television: Nearly every day  Moving or speaking so slowly that other people could have noticed. Or the opposite - being so fidgety or restless that you have been moving around a lot more than usual: Not at all  Thoughts that you would be better off dead, or of hurting yourself in some way: Not at all  PHQ-9 Total Score: 17  If you checked off any problems, how difficult have these problems made it for you to do your work, take care of things at home, or get along with other people?: Somewhat difficult      Diabetes Mellitus: Patient presents for follow up of diabetes. Symptoms: taking medications as instructed, no medication side effects noted, no TIA's, no chest pain on exertion, no dyspnea on exertion, no swelling of ankles. Symptoms have reasonably well controlled. Patient denies visual disturbances. Evaluation to date has been included below. Home sugars: BGs consistently in an acceptable range. Treatment to date: medications. Diabetic issues reviewed with her: low cholesterol diet, weight control and daily exercise discussed. Key Antihyperglycemic Medications            empagliflozin (JARDIANCE) 10 MG tablet (Taking)    Si po qam for diabetes    TRESIBA FLEXTOUCH 200 UNIT/ML SOPN (Taking)    Sig: INJECT 50 UNITS UNDER THE SKIN DAILY           Lab Results   Component Value Date    LABA1C 10.4 (H) 2022     Lab Results   Component Value Date     2022      Lab Results   Component Value Date    CREATININE 0.80 2022      No results found for: LABMICR, [id number]   Wt Readings from Last 3 Encounters:   22 142 lb (64.4 kg)   22 143 lb 3.2 oz (65 kg)   22 141 lb (64 kg)      BP Readings from Last 3 Encounters:   22 127/64   22 (!) 147/74   22 124/66          Depression  Visit Type: follow-up  Patient presents with the following symptoms: depressed mood. Patient is not experiencing: anhedonia, chest pain, choking sensation, compulsions, confusion, decreased concentration, dizziness, dry mouth, excessive worry, fatigue, feelings of hopelessness, feelings of worthlessness, hypersomnia, hyperventilation, impotence, insomnia, irritability, malaise, memory impairment, muscle tension, nausea, nervousness/anxiety, obsessions, palpitations, panic, psychomotor agitation, psychomotor retardation, restlessness, shortness of breath, suicidal ideas, suicidal planning, thoughts of death, weight gain and weight loss.   Frequency of symptoms: constantly   Severity: mild   Sleep quality: good  Nighttime awakenings: occasional      Review of Systems   Constitutional:  Negative for activity change, appetite change, chills, fatigue, fever, irritability, weight gain and weight loss. HENT:  Negative for congestion and rhinorrhea. Respiratory:  Negative for cough, choking, shortness of breath and wheezing. Cardiovascular:  Negative for palpitations. Gastrointestinal:  Negative for abdominal pain, constipation, diarrhea, nausea and vomiting. Genitourinary:  Negative for dysuria and impotence. Musculoskeletal:  Negative for arthralgias and myalgias. Neurological:  Negative for dizziness and headaches. Psychiatric/Behavioral:  Positive for depression. Negative for confusion, decreased concentration, dysphoric mood and suicidal ideas. The patient is not nervous/anxious and does not have insomnia.        Patient Active Problem List   Diagnosis    Back pain    Renal insufficiency    PUD (peptic ulcer disease)    Mixed hyperlipidemia    Neutrophilia    DM (diabetes mellitus) ([facility] [address]    Tremor    Melanocytic nevus of trunk    Falls frequently    Bacteremia    Hypokalemia    Coronary artery calcification    Major depressive disorder, recurrent episode, moderate (HCC)    Chronic pain    Hypertension    Osteoarthritis of both knees    Murmur, cardiac    At risk for allergic reaction to medication    Isolated proteinuria without specific morphologic lesion    Arthritis of left knee    History of colon cancer    GERD (gastroesophageal reflux disease)    MDD (major depressive disorder)    Scoliosis, congenital    Tobacco abuse    Osteoarthritis    CAD (coronary artery disease)    Insomnia    Acute encephalopathy    Umbilical hernia without obstruction and without gangrene    Type 2 diabetes with nephropathy (HCC)    Cancer (HCC)    Chronic narcotic dependence (HCC)    Constipation    Preventative health care    Edema of both legs    Thyroid nodule    RBBB    Post-traumatic osteoarthritis of right knee    Attention deficit disorder (ADD) without hyperactivity    Type 2 diabetes mellitus with chronic kidney disease, with long-term current use of insulin (HCC)    Type 2 diabetes mellitus without complication, with long-term current use of insulin (HCC)    Essential hypertension with goal blood pressure less than 140/90    Chronic obstructive pulmonary disease (HCC)    Microalbuminuria    Memory loss    Neuropathy    Peripheral artery occlusion (HCC)    Toe cyanosis    Paresthesia of both legs    Neural foraminal stenosis of lumbar spine    DDD (degenerative disc disease), lumbar    Pain of fifth toe    Scalp lesion    Right foot pain    Wheeze    Urethral stenosis    Mood disorder (HCC)    Chronic nonseasonal allergic rhinitis due to pollen    Trapezius muscle spasm         Blood pressure 127/64, pulse 70, temperature 98 °F (36.7 °C), temperature source Temporal, resp. rate 16, height 5' 1\" (1.549 m), weight 142 lb (64.4 kg), SpO2 96 %. Pain Scale: 6/10 Pain Location: Hip  Body mass index is 26.83 kg/m². Physical Exam  Constitutional:       General: She is not in acute distress. Appearance: Normal appearance. She is normal weight. She is not toxic-appearing. HENT:      Head: Normocephalic and atraumatic. Eyes:      Extraocular Movements: Extraocular movements intact. Conjunctiva/sclera: Conjunctivae normal.      Pupils: Pupils are equal, round, and reactive to light. Cardiovascular:      Rate and Rhythm: Normal rate and regular rhythm. Pulses: Normal pulses. Heart sounds: Normal heart sounds. No murmur heard. No friction rub. No gallop. Pulmonary:      Effort: Pulmonary effort is normal. No respiratory distress. Breath sounds: Normal breath sounds. No wheezing or rales. Skin:     General: Skin is warm and dry. Neurological:      Mental Status: She is alert. Psychiatric:         Mood and Affect: Mood normal.         Behavior: Behavior normal.         Thought Content:  Thought content normal. Judgment: Judgment normal.        ASSESSMENT and PLAN   Diagnosis Orders   1. Essential hypertension with goal blood pressure less than 140/90        2. Primary hypertension  olmesartan (BENICAR) 40 MG tablet    metoprolol succinate (TOPROL XL) 50 MG extended release tablet      3. Encounter for immunization  Influenza, FLUAD, (age 72 y+), IM, Preservative Free, 0.5 mL      4. Thyroid nodule  US THYROID      5. Chronic obstructive pulmonary disease, unspecified COPD type ([facility] [address]        6. Chronic narcotic dependence ([facility] [address]        7. Moderate episode of recurrent major depressive disorder (HCC)  vilazodone hcl 40 MG TABS      8. Drug-induced constipation  lubiprostone (AMITIZA) 24 MCG capsule      9. Primary insomnia  traZODone (DESYREL) 100 MG tablet      10. Psoriasis        11. Koilonychia, acquired            Reviewed medications and side effects in detail    LUBIPROSTONE 24 MCG PO CAPS - increase to 2 times a day with meals for constipation      Her other chronic conditions are stable at this time. She is stable on the current treatment and tolerating it well. No significant sides effects. Will not adjust therapy at this time, unless noted above. We will continue to monitor for any problems. Medications refilled and lab work has been ordered where needed. Reviewed medications are explained including any potential interactions or side effects in detail. The patient's questions were answered. She  understands the above and has no further questions. Further workup and treatment should be done if symptoms persist, worsen or new symptoms occur. She  will call to notify us of any problems, complications or worsening symptoms. There are no Patient Instructions on file for this visit. (Some details in this note may have been created with speech-recognition software. Some errors in speech recognition may have occurred.    Most of those have been corrected but some may have been missed.)         Denisse Urbina Yumi Caputo MD  [address] [address]  Phone [phone]

## 2022-09-29 PROCEDURE — G0008 ADMIN INFLUENZA VIRUS VAC: HCPCS | Performed by: FAMILY MEDICINE

## 2022-09-29 PROCEDURE — 90694 VACC AIIV4 NO PRSRV 0.5ML IM: CPT | Performed by: FAMILY MEDICINE

## 2022-10-04 ASSESSMENT — ENCOUNTER SYMPTOMS
ABDOMINAL PAIN: 0
DIARRHEA: 0
SHORTNESS OF BREATH: 0
NAUSEA: 0
VOMITING: 0
COUGH: 0
HYPERVENTILATION: 0
WHEEZING: 0
RHINORRHEA: 0
CHOKING: 0
CONSTIPATION: 0

## 2022-10-11 ENCOUNTER — HOSPITAL ENCOUNTER (OUTPATIENT)
Dept: ULTRASOUND IMAGING | Age: 67
Discharge: HOME | End: 2022-10-14
Payer: MEDICARE

## 2022-10-11 DIAGNOSIS — E04.1 THYROID NODULE: ICD-10-CM

## 2022-10-11 PROCEDURE — 76536 US EXAM OF HEAD AND NECK: CPT

## 2022-10-17 ENCOUNTER — HOSPITAL ENCOUNTER (OUTPATIENT)
Dept: CT IMAGING | Age: 67
Discharge: HOME | End: 2022-10-20
Payer: MEDICARE

## 2022-10-17 VITALS
OXYGEN SATURATION: 97 % | WEIGHT: 140 LBS | BODY MASS INDEX: 27.48 KG/M2 | RESPIRATION RATE: 16 BRPM | DIASTOLIC BLOOD PRESSURE: 68 MMHG | HEART RATE: 70 BPM | SYSTOLIC BLOOD PRESSURE: 160 MMHG | TEMPERATURE: 98.2 F | HEIGHT: 60 IN

## 2022-10-17 DIAGNOSIS — D72.9 NEUTROPHILIA: ICD-10-CM

## 2022-10-17 LAB
BASOPHILS # BLD: 0 K/UL (ref 0–0.2)
BASOPHILS NFR BLD: 0 % (ref 0–2)
BONE MARROW PREP & WRIGHT STAIN: NORMAL
DIFFERENTIAL METHOD BLD: ABNORMAL
EOSINOPHIL # BLD: 0.1 K/UL (ref 0–0.8)
EOSINOPHIL NFR BLD: 1 % (ref 0.5–7.8)
ERYTHROCYTE [DISTWIDTH] IN BLOOD BY AUTOMATED COUNT: 14.3 % (ref 11.9–14.6)
GLUCOSE BLD STRIP.AUTO-MCNC: 128 MG/DL (ref 65–100)
HCT VFR BLD AUTO: 44.4 % (ref 35.8–46.3)
HGB BLD-MCNC: 14.3 G/DL (ref 11.7–15.4)
IMM GRANULOCYTES # BLD AUTO: 0 K/UL (ref 0–0.5)
IMM GRANULOCYTES NFR BLD AUTO: 0 % (ref 0–5)
LYMPHOCYTES # BLD: 2.4 K/UL (ref 0.5–4.6)
LYMPHOCYTES NFR BLD: 19 % (ref 13–44)
MCH RBC QN AUTO: 27.5 PG (ref 26.1–32.9)
MCHC RBC AUTO-ENTMCNC: 32.2 G/DL (ref 31.4–35)
MCV RBC AUTO: 85.4 FL (ref 82–102)
MONOCYTES # BLD: 0.6 K/UL (ref 0.1–1.3)
MONOCYTES NFR BLD: 5 % (ref 4–12)
NEUTS SEG # BLD: 9.4 K/UL (ref 1.7–8.2)
NEUTS SEG NFR BLD: 75 % (ref 43–78)
NRBC # BLD: 0 K/UL (ref 0–0.2)
PLATELET # BLD AUTO: 321 K/UL (ref 150–450)
PMV BLD AUTO: 7.9 FL (ref 9.4–12.3)
RBC # BLD AUTO: 5.2 M/UL (ref 4.05–5.2)
SERVICE CMNT-IMP: ABNORMAL
WBC # BLD AUTO: 12.5 K/UL (ref 4.3–11.1)

## 2022-10-17 PROCEDURE — 85025 COMPLETE CBC W/AUTO DIFF WBC: CPT

## 2022-10-17 PROCEDURE — 6360000002 HC RX W HCPCS: Performed by: RADIOLOGY

## 2022-10-17 PROCEDURE — 82962 GLUCOSE BLOOD TEST: CPT

## 2022-10-17 PROCEDURE — C1830 POWER BONE MARROW BX NEEDLE: HCPCS

## 2022-10-17 PROCEDURE — 88313 SPECIAL STAINS GROUP 2: CPT

## 2022-10-17 PROCEDURE — 88311 DECALCIFY TISSUE: CPT

## 2022-10-17 PROCEDURE — 88305 TISSUE EXAM BY PATHOLOGIST: CPT

## 2022-10-17 RX ORDER — FENTANYL CITRATE 50 UG/ML
INJECTION, SOLUTION INTRAMUSCULAR; INTRAVENOUS
Status: COMPLETED | OUTPATIENT
Start: 2022-10-17 | End: 2022-10-17

## 2022-10-17 RX ORDER — MIDAZOLAM HYDROCHLORIDE 2 MG/2ML
INJECTION, SOLUTION INTRAMUSCULAR; INTRAVENOUS
Status: COMPLETED | OUTPATIENT
Start: 2022-10-17 | End: 2022-10-17

## 2022-10-17 RX ORDER — OXYCODONE HYDROCHLORIDE 5 MG/1
5 TABLET ORAL EVERY 4 HOURS PRN
Status: DISCONTINUED | OUTPATIENT
Start: 2022-10-17 | End: 2022-10-21 | Stop reason: HOSPADM

## 2022-10-17 RX ORDER — SODIUM CHLORIDE 9 MG/ML
INJECTION, SOLUTION INTRAVENOUS CONTINUOUS
Status: DISCONTINUED | OUTPATIENT
Start: 2022-10-17 | End: 2022-10-21 | Stop reason: HOSPADM

## 2022-10-17 RX ORDER — ONDANSETRON 2 MG/ML
4 INJECTION INTRAMUSCULAR; INTRAVENOUS EVERY 8 HOURS PRN
Status: DISCONTINUED | OUTPATIENT
Start: 2022-10-17 | End: 2022-10-21 | Stop reason: HOSPADM

## 2022-10-17 RX ORDER — OXYCODONE HYDROCHLORIDE 5 MG/1
10 TABLET ORAL EVERY 4 HOURS PRN
Status: DISCONTINUED | OUTPATIENT
Start: 2022-10-17 | End: 2022-10-21 | Stop reason: HOSPADM

## 2022-10-17 RX ORDER — DIPHENHYDRAMINE HYDROCHLORIDE 50 MG/ML
INJECTION INTRAMUSCULAR; INTRAVENOUS
Status: COMPLETED | OUTPATIENT
Start: 2022-10-17 | End: 2022-10-17

## 2022-10-17 RX ADMIN — MIDAZOLAM HYDROCHLORIDE 1 MG: 1 INJECTION, SOLUTION INTRAMUSCULAR; INTRAVENOUS at 11:06

## 2022-10-17 RX ADMIN — MIDAZOLAM HYDROCHLORIDE 1 MG: 1 INJECTION, SOLUTION INTRAMUSCULAR; INTRAVENOUS at 11:01

## 2022-10-17 RX ADMIN — MIDAZOLAM HYDROCHLORIDE 1 MG: 1 INJECTION, SOLUTION INTRAMUSCULAR; INTRAVENOUS at 10:56

## 2022-10-17 RX ADMIN — FENTANYL CITRATE 50 MCG: 50 INJECTION, SOLUTION INTRAMUSCULAR; INTRAVENOUS at 10:56

## 2022-10-17 RX ADMIN — FENTANYL CITRATE 50 MCG: 50 INJECTION, SOLUTION INTRAMUSCULAR; INTRAVENOUS at 11:01

## 2022-10-17 RX ADMIN — DIPHENHYDRAMINE HYDROCHLORIDE 50 MG: 50 INJECTION, SOLUTION INTRAMUSCULAR; INTRAVENOUS at 10:56

## 2022-10-17 RX ADMIN — FENTANYL CITRATE 50 MCG: 50 INJECTION, SOLUTION INTRAMUSCULAR; INTRAVENOUS at 11:06

## 2022-10-17 NOTE — H&P
Department of Interventional Radiology  [phone]    History and Physical    Patient:  Tora Skiff MRN:  [id number]  SSN:  [id number]    YOB: 1955  Age:  75 y.o. Sex:  female      Primary Care Provider:  Rowena Penn MD  Referring Physician:  Agnes Gonzalez MD    Subjective:     Chief Complaint: Neutrophilia    History of the Present Illness: The patient is a 75 y.o. female who presents upon referral by Dr Jaskaran Jaime for CT-guided bone marrow biopsy and aspiration for neutrophilia. Patient is NPO. Her last dose of Plavix was 5 nights ago      Past Medical History:   Diagnosis Date    Arthritis     does not know kind-sees rheumatologist/ spine/ LLE/ L ft/toes; pt reports psoriatic    Bronchitis ? Aug 2013    no hospitalization    CAD (coronary artery disease) 2011    EF= 65 %  left anterior descending coronary stenosis is 30 %. 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    Chronic kidney disease     Chronic pain     spine-has congenital scoliosis-R knee    COPD (chronic obstructive pulmonary disease) ([facility] [address] 12/13/13    no use of rescue inhaler x 3 wks; denies JI    Diabetes (HCC)     type 2 insulin dep x 2 yrs;avg fasting BS-118-205;S/S hypo @ 90    GERD (gastroesophageal reflux disease)     controlled with protonix    History of colon cancer 3/15/2013    S/p partial colectomy    Hypertension     controlled with med    Insomnia     Leiomyosarcoma of arm ([facility] [address] 1998    removed by Dr. Emma Su    Mixed hyperlipidemia 3/15/2013    intolerant to medications    Nicotine vapor product user     Psychiatric disorder     depression and anxiety no med    PUD (peptic ulcer disease)     gastric    Renal insufficiency 3/15/2013    Has seen Dr. Jamel Brown in the past.    Scoliosis, congenital     Thyroid nodule 3/15/2013    Type II or unspecified type diabetes mellitus without mention of complication, not stated as uncontrolled 3/15/2013      Is adjusting insulin at this time. Past Surgical History:   Procedure Laterality Date    BACK SURGERY  2005    spinal cord implant    BACK SURGERY  2005    spinal cord implant removal    BACK SURGERY  2005    nerve embedded in muscle-resection R wrist    CERVICAL FUSION  10/11    w/ discectomy and cage - Dr. Doc Anaya    hernia repair    [place] or 75    spinal fusion lumbar    OVARY REMOVAL  1974    left partial    IL ABDOMEN SURGERY PROC UNLISTED  1996    colon resection    KATY AND BSO (CERVIX REMOVED)  1995    TOTAL KNEE ARTHROPLASTY  5/11    partial - Dr. Hardy Cat, Jan 2018        Review of Systems:    Pertinent items are noted in HPI. Prior to Admission medications    Medication Sig Start Date End Date Taking? Authorizing Provider   olmesartan (BENICAR) 40 MG tablet 1 p.o. daily for blood pressure instead of olmesartan HCTZ 9/28/22   Cherrie Nicolas MD   metoprolol succinate (TOPROL XL) 50 MG extended release tablet TAKE ONE TABLET BY MOUTH EVERY DAY 9/28/22   Cherrie Nicolas MD   traZODone (DESYREL) 100 MG tablet TAKE 2-3 TABLETS BY MOUTH AT BEDTIME 9/28/22   Cherrie Nicolas MD   ACCU-CHEK GUIDE strip  9/19/22   Historical Provider, MD   lubiprostone (AMITIZA) 24 MCG capsule Take 1 capsule by mouth 2 times daily (with meals) 9/28/22   Cherrie Nicolas MD   vilazodone hcl 40 MG TABS Take 1 tablet by mouth daily 9/28/22   Cherrie Nicolas MD   levocetirizine (XYZAL) 5 MG tablet Take 1 Tablet by mouth nightly.  - Oral 8/16/22   Cherrie Nicolas MD   empagliflozin (JARDIANCE) 10 MG tablet 1 po qam for diabetes 8/16/22   Cherrie Nicolas MD   montelukast (SINGULAIR) 10 MG tablet Take 1 tablet by mouth in the morning. 7/21/22   Cherrie Nicolas MD   cyclobenzaprine (FLEXERIL) 10 MG tablet TAKE ONE TABLET BY MOUTH THREE TIMES A DAY AS NEEDED FOR MUSCLE SPASMS 7/21/22   Cherrie Nicolas MD   clobetasol (TEMOVATE) 0.05 % ointment 7/15/22   Historical Provider, MD   vilazodone hcl 40 MG TABS 1 po qam with food for depression 7/21/22   Yumiko Souza MD   TRESIBA FLEXTOUCH 200 UNIT/ML SOPN INJECT 50 UNITS UNDER THE SKIN DAILY 6/27/22   Yumiko Souza MD   clopidogrel (PLAVIX) 75 MG tablet Take 75 mg by mouth daily    Historical Provider, MD   pantoprazole (PROTONIX) 40 MG tablet Take 1 Tablet by mouth BID For stomach - instead of prilosec( failed priolosec and nexium and prevacid) - Oral 6/23/22   Yumiko Souza MD   amLODIPine (NORVASC) 5 MG tablet Take 1 Tablet by mouth daily. ( instead of tribenzor with benicar hct) - Oral 6/23/22   Yumiko Souza MD   rosuvastatin (CRESTOR) 20 MG tablet Take 1 tablet by mouth nightly 6/23/22   Yumiko Souza MD   albuterol sulfate  (90 Base) MCG/ACT inhaler INHALE TWO PUFFS BY MOUTH EVERY 6 HOURS AS NEEDED FOR WHEEZING 5/13/22   Ar Automatic Reconciliation   morphine (MSIR) 15 MG tablet Take 15 mg by mouth every 6 hours as needed. Ar Automatic Reconciliation   mupirocin (BACTROBAN) 2 % nasal ointment by Nasal route 2 times daily 7/15/21   Ar Automatic Reconciliation   naloxegol (MOVANTIK) 12.5 MG TABS tablet Take 25 mg by mouth every morning (before breakfast)    Ar Automatic Reconciliation   naloxone 4 MG/0.1ML LIQD nasal spray 4 mg 5/9/22   Ar Automatic Reconciliation   promethazine (PHENERGAN) 25 MG tablet Take 25 mg by mouth every 6 hours as needed 5/4/21   Ar Automatic Reconciliation        Allergies   Allergen Reactions    Latex Rash     Skin raw    Ketamine      Other reaction(s): Altered Mental State-Intolerance    Acetaminophen Other (See Comments)     Pt states she can take medication    Adhesive Tape Other (See Comments)     Other reaction(s): Unknown-Unspecified  Bruising, burning  Bruising, burning      Diclofenac Sodium Other (See Comments)    Diclofenac-Misoprostol Nausea Only    Misoprostol Other (See Comments)    Nsaids Other (See Comments)     Other reaction(s):  Other- (not listed) - Allergy  Not to take due to kidneys blood and pus in urine. Other reaction(s): Other- (not listed) - Allergy  NA  NA      Ropinirole Other (See Comments)     Keep pt awake    Varenicline Other (See Comments)     \"I had violent outbursts\"    Gabapentin Palpitations     Other reaction(s): Other- (not listed) - Allergy  Upset stomach        Family History   Problem Relation Age of Onset    Heart Disease Sister     Lung Disease Sister     Alcohol Abuse Mother     Alcohol Abuse Father     Diabetes Paternal Grandfather     Heart Disease Brother     Lung Disease Sister      Social History     Tobacco Use    Smoking status: Some Days     Packs/day: 1.00     Types: Cigarettes     Last attempt to quit: 2021     Years since quittin.1    Smokeless tobacco: Former     Quit date: 2018    Tobacco comments:     Quit smoking: quit   Substance Use Topics    Alcohol use: No        Not in a hospital admission.     Objective:       Physical Examination:    Vitals:    10/17/22 0945 10/17/22 0950 10/17/22 0953   BP:  (!) 177/71 (!) 177/71   Pulse:  80 80   Resp:  16 18   Temp:  98.2 °F (36.8 °C)    TempSrc:  Oral    SpO2:  93% 93%   Weight: 140 lb (63.5 kg)     Height: 5' (1.524 m)         Pain Assessment                       0                              HEART: regular rate and rhythm, S1, S2 normal, no murmur, click, rub or gallop  LUNG: clear to auscultation bilaterally  ABDOMEN: soft, non-tender; bowel sounds normal; no masses,  no organomegaly  EXTREMITIES: No pedal edema noted    Laboratory:     Lab Results   Component Value Date/Time     2022 11:02 AM     2022 11:07 AM    K 3.0 2022 11:02 AM    K 3.4 2022 11:07 AM     2022 11:02 AM     2022 11:07 AM    CO2 30 2022 11:02 AM    CO2 26 2022 11:07 AM    BUN 11 2022 11:02 AM    BUN 10 2022 11:07 AM    GFRAA >60 2022 11:02 AM    GFRAA >60 2022 10:26 AM    MG 1.7 10/15/2021 03:41 PM    MG 1.6 10/04/2021 04:39 AM    GLOB 3.7 08/08/2022 11:02 AM    GLOB 3.7 02/25/2022 10:26 AM    ALT 31 08/08/2022 11:02 AM    ALT 18 02/25/2022 10:26 AM     Lab Results   Component Value Date/Time    WBC 12.5 10/17/2022 10:04 AM    WBC 14.2 08/08/2022 11:02 AM    HGB 14.3 10/17/2022 10:04 AM    HGB 14.0 08/08/2022 11:02 AM    HCT 44.4 10/17/2022 10:04 AM    HCT 42.7 08/08/2022 11:02 AM     10/17/2022 10:04 AM     08/08/2022 11:02 AM     No results found for: APTT, INR    Assessment:     70-year-old female with neutrophilia        Plan:     Planned Procedure: CT-guided bone marrow biopsy and aspiration under moderate sedation    Risks, benefits, and alternatives reviewed with patient and she agrees to proceed with the procedure.       Signed By: ALEJANDRA Chapin     October 17, 2022

## 2022-10-17 NOTE — PRE SEDATION
Sedation Pre-Procedure Note    Patient Name: Leticia Pappas   YOB: 1955  Room/Bed: Room/bed info not found  Medical Record Number: [id number]  Date: 10/17/2022   Time: 10:22 AM       Indication: Neutrophilia    Consent: I have discussed with the patient and/or the patient representative the indication, alternatives, and the possible risks and/or complications of the planned procedure and the anesthesia methods. The patient and/or patient representative appear to understand and agree to proceed. Vital Signs:   Vitals:    10/17/22 0953   BP: (!) 177/71   Pulse: 80   Resp: 18   Temp:    SpO2: 93%       Past Medical History:   has a past medical history of Arthritis, Bronchitis, CAD (coronary artery disease), Cancer ([facility] [address] Chronic kidney disease, Chronic pain, COPD (chronic obstructive pulmonary disease) ([facility] [address] Diabetes ([facility] [address] GERD (gastroesophageal reflux disease), History of colon cancer, Hypertension, Insomnia, Leiomyosarcoma of arm ([facility] [address] Mixed hyperlipidemia, Nicotine vapor product user, Psychiatric disorder, PUD (peptic ulcer disease), Renal insufficiency, Scoliosis, congenital, Thyroid nodule, and Type II or unspecified type diabetes mellitus without mention of complication, not stated as uncontrolled. Past Surgical History:   has a past surgical history that includes back surgery (); back surgery (); back surgery (); Total knee arthroplasty (); Colonoscopy; cervical fusion (10/11); Total abdominal hysterectomy w/ bilateral salpingoophorectomy ();  section (1991); pr abdomen surgery proc unlisted (); lumbar fusion ( or ); Ovary removal (); hernia repair (); and Cholecystectomy (). Medications:   Scheduled Meds:   Continuous Infusions:   PRN Meds:   Home Meds:   Prior to Admission medications    Medication Sig Start Date End Date Taking?  Authorizing Provider   olmesartan (BENICAR) 40 MG tablet 1 p.o. daily for blood pressure instead of olmesartan HCTZ 9/28/22   Cherrie Nicolas MD   metoprolol succinate (TOPROL XL) 50 MG extended release tablet TAKE ONE TABLET BY MOUTH EVERY DAY 9/28/22   Cherrie Nicolas MD   traZODone (DESYREL) 100 MG tablet TAKE 2-3 TABLETS BY MOUTH AT BEDTIME 9/28/22   Cherrie Nicolas MD   ACCU-CHEK GUIDE strip  9/19/22   Historical Provider, MD   lubiprostone (AMITIZA) 24 MCG capsule Take 1 capsule by mouth 2 times daily (with meals) 9/28/22   Cherrie Nicolas MD   vilazodone hcl 40 MG TABS Take 1 tablet by mouth daily 9/28/22   Cherrie Nicolas MD   levocetirizine (XYZAL) 5 MG tablet Take 1 Tablet by mouth nightly. - Oral 8/16/22   Cherrie Nicolas MD   empagliflozin (JARDIANCE) 10 MG tablet 1 po qam for diabetes 8/16/22   Cherrie Nicolas MD   montelukast (SINGULAIR) 10 MG tablet Take 1 tablet by mouth in the morning. 7/21/22   Cherrie Nicolas MD   cyclobenzaprine (FLEXERIL) 10 MG tablet TAKE ONE TABLET BY MOUTH THREE TIMES A DAY AS NEEDED FOR MUSCLE SPASMS 7/21/22   Cherrie Nicolas MD   clobetasol (TEMOVATE) 0.05 % ointment  7/15/22   Historical Provider, MD   vilazodone hcl 40 MG TABS 1 po qam with food for depression 7/21/22   Cherrie Nicolas MD   TRESIBA FLEXTOUCH 200 UNIT/ML SOPN INJECT 50 UNITS UNDER THE SKIN DAILY 6/27/22   Cherrie Nicolas MD   clopidogrel (PLAVIX) 75 MG tablet Take 75 mg by mouth daily    Historical Provider, MD   pantoprazole (PROTONIX) 40 MG tablet Take 1 Tablet by mouth BID For stomach - instead of prilosec( failed priolosec and nexium and prevacid) - Oral 6/23/22   Cherrie Nicolas MD   amLODIPine (NORVASC) 5 MG tablet Take 1 Tablet by mouth daily.  ( instead of tribenzor with benicar hct) - Oral 6/23/22   Cherrie Nicolas MD   rosuvastatin (CRESTOR) 20 MG tablet Take 1 tablet by mouth nightly 6/23/22   Cherrie Nicolas MD   albuterol sulfate  (90 Base) MCG/ACT inhaler INHALE TWO PUFFS BY MOUTH EVERY 6 HOURS AS NEEDED FOR WHEEZING 5/13/22   Ar Automatic Reconciliation   morphine (MSIR) 15 MG tablet Take 15 mg by mouth every 6 hours as needed. Ar Automatic Reconciliation   mupirocin (BACTROBAN) 2 % nasal ointment by Nasal route 2 times daily 7/15/21   Ar Automatic Reconciliation   naloxegol (MOVANTIK) 12.5 MG TABS tablet Take 25 mg by mouth every morning (before breakfast)    Ar Automatic Reconciliation   naloxone 4 MG/0.1ML LIQD nasal spray 4 mg 5/9/22   Ar Automatic Reconciliation   promethazine (PHENERGAN) 25 MG tablet Take 25 mg by mouth every 6 hours as needed 5/4/21   Ar Automatic Reconciliation     Last dose of Plavix was 5 nights ago    Pre-Sedation Documentation and Exam:   I have personally completed a history, physical exam & review of systems for this patient (see notes). Vital signs have been reviewed (see flow sheet for vitals).     Mallampati Airway Assessment:  normal, dentition not prohibitive, Mallampati Class III - (soft palate & base of uvula are visible)  Upper dentures present    Prior History of Anesthesia Complications:   none    ASA Classification:  Class 2 - A normal healthy patient with mild systemic disease    Sedation/ Anesthesia Plan:   intravenous sedation    Medications Planned:   midazolam (Versed) intravenously and fentanyl intravenously    Patient is an appropriate candidate for plan of sedation: yes    Electronically signed by ALEJANDRA Lang on 10/17/2022 at 10:22 AM

## 2022-10-17 NOTE — OR NURSING
TRANSFER - OUT REPORT:           Verbal report given to Kelby Mcnair RN(name) on Elisha Beauchamp  being transferred to IR Recovery 3(unit) for routine post-op              Report consisted of patients Situation, Background, Assessment and      Recommendations(SBAR). Information from the following report(s) SBAR, Procedure Summary and MAR was reviewed with the receiving nurse. Opportunity for questions and clarification was provided. Conscious Sedation:    150 Mcg of Fentanyl administered   3 Mg of Versed administered   50 Mg of Benadryl administered        Pt tolerated procedure well.          Peripheral Intravenous Line:   Peripheral IV 10/17/22 Right Forearm (Active)       VITALS:  BP (!) 160/68   Pulse 75   Temp 98.2 °F (36.8 °C) (Oral)   Resp 18   Ht 5' (1.524 m)   Wt 140 lb (63.5 kg)   SpO2 97%   BMI 27.34 kg/m²

## 2022-10-17 NOTE — BRIEF OP NOTE
Department of Interventional Radiology  [phone]        Interventional Radiology Brief Procedure Note    Patient: Edmund Love MRN: [id number]  SSN: [id number]    YOB: 1955  Age: 75 y.o. Sex: female      Date of Procedure: 10/17/2022    Pre-Procedure Diagnosis: Neutropenia. Scoliosis. Post-Procedure Diagnosis: SAME    Procedure(s):  Image Guided Biopsy    Brief Description of Procedure: Left iliac bone. Performed By: Daniel Hayes MD     Assistants: None    Anesthesia:Moderate Sedation    Estimated Blood Loss: Less than 10ml    Specimens:  Pathology    Implants:  None    Findings: Slender Right Iliac bone (prior harvest?). Complications: None    Recommendations: 1 hour bedrest.       Follow Up: Dr Delana Landau.      Signed By: Daniel Hayes MD     October 17, 2022

## 2022-10-17 NOTE — DISCHARGE INSTRUCTIONS
If you have any questions about your procedure, please call the Interventional Radiology department at [phone]. After business hours (5pm) and weekends, call the answering service at [phone] and ask for the Radiologist on call to be paged. Si tiene Preguntas acerca del procedimiento, por favor llame al departamento de Radiología Intervencional al [phone]. Después de horas de oficina (5 pm) y los fines de [employer], llamar al [facility] al [phone] y pregunte por el Radiologo de [place]. Interventional Radiology General Nurse Discharge    After general anesthesia or intravenous sedation, for 24 hours or while taking prescription Narcotics:  Limit your activities  Do not drive and operate hazardous machinery  Do not make important personal or business decisions  Do  not drink alcoholic beverages  If you have not urinated within 8 hours after discharge, please contact your surgeon on call. * Please give a list of your current medications to your Primary Care Provider. * Please update this list whenever your medications are discontinued, doses are     changed, or new medications (including over-the-counter products) are added. * Please carry medication information at all times in case of emergency situations. These are general instructions for a healthy lifestyle:    No smoking/ No tobacco products/ Avoid exposure to second hand smoke  Surgeon General's Warning:  Quitting smoking now greatly reduces serious risk to your health.     Obesity, smoking, and sedentary lifestyle greatly increases your risk for illness  A healthy diet, regular physical exercise & weight monitoring are important for maintaining a healthy lifestyle    You may be retaining fluid if you have a history of heart failure or if you experience any of the following symptoms:  Weight gain of 3 pounds or more overnight or 5 pounds in a week, increased swelling in our hands or feet or shortness of breath while lying flat in bed. Please call your doctor as soon as you notice any of these symptoms; do not wait until your next office visit. Recognize signs and symptoms of STROKE:  F-face looks uneven    A-arms unable to move or move unevenly    S-speech slurred or non-existent    T-time-call 911 as soon as signs and symptoms begin-DO NOT go       Back to bed or wait to see if you get better-TIME IS BRAIN.

## 2022-10-21 DIAGNOSIS — E78.2 MIXED HYPERLIPIDEMIA: ICD-10-CM

## 2022-10-21 LAB
FLOW CYTOMETRY RESULTS: NORMAL
SPECIMEN SOURCE: NORMAL
TEST ORDERED: NORMAL

## 2022-10-21 RX ORDER — ROSUVASTATIN CALCIUM 20 MG/1
TABLET, COATED ORAL
Qty: 30 TABLET | Refills: 5 | Status: SHIPPED | OUTPATIENT
Start: 2022-10-21

## 2022-10-22 DIAGNOSIS — E78.2 MIXED HYPERLIPIDEMIA: ICD-10-CM

## 2022-10-24 RX ORDER — ROSUVASTATIN CALCIUM 20 MG/1
TABLET, COATED ORAL
Qty: 30 TABLET | Refills: 3 | OUTPATIENT
Start: 2022-10-24

## 2022-10-25 ENCOUNTER — NURSE TRIAGE (OUTPATIENT)
Dept: OTHER | Facility: CLINIC | Age: 67
End: 2022-10-25

## 2022-11-07 ENCOUNTER — OFFICE VISIT (OUTPATIENT)
Dept: ONCOLOGY | Age: 67
End: 2022-11-07
Payer: MEDICARE

## 2022-11-07 VITALS
WEIGHT: 140.3 LBS | RESPIRATION RATE: 21 BRPM | HEART RATE: 71 BPM | TEMPERATURE: 98 F | DIASTOLIC BLOOD PRESSURE: 73 MMHG | BODY MASS INDEX: 27.55 KG/M2 | SYSTOLIC BLOOD PRESSURE: 142 MMHG | OXYGEN SATURATION: 93 % | HEIGHT: 60 IN

## 2022-11-07 DIAGNOSIS — D72.9 NEUTROPHILIA: Primary | ICD-10-CM

## 2022-11-07 PROCEDURE — 3074F SYST BP LT 130 MM HG: CPT | Performed by: INTERNAL MEDICINE

## 2022-11-07 PROCEDURE — 1090F PRES/ABSN URINE INCON ASSESS: CPT | Performed by: INTERNAL MEDICINE

## 2022-11-07 PROCEDURE — 3078F DIAST BP <80 MM HG: CPT | Performed by: INTERNAL MEDICINE

## 2022-11-07 PROCEDURE — 1123F ACP DISCUSS/DSCN MKR DOCD: CPT | Performed by: INTERNAL MEDICINE

## 2022-11-07 PROCEDURE — G8484 FLU IMMUNIZE NO ADMIN: HCPCS | Performed by: INTERNAL MEDICINE

## 2022-11-07 PROCEDURE — G8417 CALC BMI ABV UP PARAM F/U: HCPCS | Performed by: INTERNAL MEDICINE

## 2022-11-07 PROCEDURE — 3017F COLORECTAL CA SCREEN DOC REV: CPT | Performed by: INTERNAL MEDICINE

## 2022-11-07 PROCEDURE — 4004F PT TOBACCO SCREEN RCVD TLK: CPT | Performed by: INTERNAL MEDICINE

## 2022-11-07 PROCEDURE — G8400 PT W/DXA NO RESULTS DOC: HCPCS | Performed by: INTERNAL MEDICINE

## 2022-11-07 PROCEDURE — 99214 OFFICE O/P EST MOD 30 MIN: CPT | Performed by: INTERNAL MEDICINE

## 2022-11-07 PROCEDURE — G8428 CUR MEDS NOT DOCUMENT: HCPCS | Performed by: INTERNAL MEDICINE

## 2022-11-07 ASSESSMENT — PATIENT HEALTH QUESTIONNAIRE - PHQ9
SUM OF ALL RESPONSES TO PHQ QUESTIONS 1-9: 0
SUM OF ALL RESPONSES TO PHQ9 QUESTIONS 1 & 2: 0
1. LITTLE INTEREST OR PLEASURE IN DOING THINGS: 0
SUM OF ALL RESPONSES TO PHQ QUESTIONS 1-9: 0
2. FEELING DOWN, DEPRESSED OR HOPELESS: 0
SUM OF ALL RESPONSES TO PHQ QUESTIONS 1-9: 0
SUM OF ALL RESPONSES TO PHQ QUESTIONS 1-9: 0

## 2022-11-07 NOTE — PATIENT INSTRUCTIONS
Patient Instructions from Today's Visit    Reason for Visit:  Follow up Neutrophilia    Plan:  Continue to monitor labs in one year  Continue to see primary care provider     Follow Up: Follow up in 1 year with labs prior    Recent Lab Results:    No visits with results within 3 Day(s) from this visit. Latest known visit with results is:   Hospital Outpatient Visit on 10/17/2022   Component Date Value Ref Range Status    WBC 10/17/2022 12.5 (A)  4.3 - 11.1 K/uL Final    RBC 10/17/2022 5.20  4.05 - 5.2 M/uL Final    Hemoglobin 10/17/2022 14.3  11.7 - 15.4 g/dL Final    Hematocrit 10/17/2022 44.4  35.8 - 46.3 % Final    MCV 10/17/2022 85.4  82 - 102 FL Final    MCH 10/17/2022 27.5  26.1 - 32.9 PG Final    MCHC 10/17/2022 32.2  31.4 - 35.0 g/dL Final    RDW 10/17/2022 14.3  11.9 - 14.6 % Final    Platelets 36/33/6968 321  150 - 450 K/uL Final    MPV 10/17/2022 7.9 (A)  9.4 - 12.3 FL Final    nRBC 10/17/2022 0.00  0.0 - 0.2 K/uL Final    **Note: Absolute NRBC parameter is now reported with Hemogram**    Differential Type 10/17/2022 AUTOMATED    Final    Seg Neutrophils 10/17/2022 75  43 - 78 % Final    Lymphocytes 10/17/2022 19  13 - 44 % Final    Monocytes 10/17/2022 5  4.0 - 12.0 % Final    Eosinophils % 10/17/2022 1  0.5 - 7.8 % Final    Basophils 10/17/2022 0  0.0 - 2.0 % Final    Immature Granulocytes 10/17/2022 0  0.0 - 5.0 % Final    Segs Absolute 10/17/2022 9.4 (A)  1.7 - 8.2 K/UL Final    Absolute Lymph # 10/17/2022 2.4  0.5 - 4.6 K/UL Final    Absolute Mono # 10/17/2022 0.6  0.1 - 1.3 K/UL Final    Absolute Eos # 10/17/2022 0.1  0.0 - 0.8 K/UL Final    Basophils Absolute 10/17/2022 0.0  0.0 - 0.2 K/UL Final    Absolute Immature Granulocyte 10/17/2022 0.0  0.0 - 0.5 K/UL Final    Bone Marrow Prep & Idell Makos Stain 10/17/2022 FOR HEMATOLOGY SERVICES RENDERED    Final    [facility] GI[facility] STAIN    Test Ordered 10/17/2022 FLOW CYTOMETRY MS    Final    Source 10/17/2022 BONE MARROW    Final    FLOW CYTOMETRY RESULTS. 10/17/2022 Test results scanned into EPIC    Final    POC Glucose 10/17/2022 128 (A)  65 - 100 mg/dL Final    Comment: 47 - 60 mg/dl Consistent with, but not fully diagnostic of hypoglycemia. 101 - 125 mg/dl Impaired fasting glucose/consistent with pre-diabetes mellitus  > 126 mg/dl Fasting glucose consistent with overt diabetes mellitus      Performed by: 10/17/2022 Leesa Lobo   Final         Treatment Summary has been discussed and given to patient: n/a        -------------------------------------------------------------------------------------------------------------------  Please call our office at [phone] if you have any  of the following symptoms:   Fever of 100.5 or greater  Chills  Shortness of breath  Swelling or pain in one leg    After office hours an answering service is available and will contact a provider for emergencies or if you are experiencing any of the above symptoms. Patient does express an interest in My Chart. My Chart log in information explained on the after visit summary printout at the [facility] desk.     Kathleen Devlin RN

## 2022-11-07 NOTE — PROGRESS NOTES
Data Source: Patient, [facility] record. 11/7/2022    11:55 AM    Markus Del Castillo [id number]    15 y.o. Patient Encounter: [address] Visit      Heme Diagnosis:  Neutrophilia  Heme History (Copied from prior):   78  female ex-smoker history of DM, CKD, anxiety/depression, peptic ulcer disease, leiomyosarcoma of arm excised in 1998, hypertension/dyslipidemia, colon cancer status post partial colectomy 2013 (without need for adjuvant therapy), GERD, COPD, KATY/BSO, lumbar fusion, hernia repair, spinal cord implant and removal 2005, cervical fusion with discectomy 2011, cholecystectomy, now referred to us for persistent leukocytosis. Patient was seen by hematology myself 7/14 for same, first noticed in 4/2011 when her white count was 13.1. She had denied any infection or steroid exposure at the time. Work-up 7/1/14 included peripheral blood flow cytometry, BCR ABL translocation and JAK2 V617F testing which came back unremarkable. Patient was scheduled to follow-up with us in a few weeks however subsequently was lost to follow-up. She represents to establish care. She denies any recent infections or steroid use. Denies any B symptoms. Reports scheduled for eye surgery in the near future. Interval History:  11/7/2022: Reports doing reasonably. Denies any complaints. Bone marrow biopsy 10/17/2022 with normocellular marrow without evidence of bone marrow disorder (no evidence of dysplasia, normal karyotype, MPN FISH normal, myeloid molecular panel negative). Patient reassured. Her mild stable neutrophilia is likely reactive ( gives history of smoking, and seasonal allergies) rather than a bone marrow disorder. REVIEW OF SYSTEMS:  As mentioned above, all other systems were reviewed in full and are negative. Past Medical History:   Diagnosis Date    Arthritis     does not know kind-sees rheumatologist/ spine/ LLE/ L ft/toes; pt reports psoriatic    Bronchitis ?  Aug Medication Sig Dispense Refill    rosuvastatin (CRESTOR) 20 MG tablet TAKE ONE TABLET BY MOUTH NIGHTLY 30 tablet 5    olmesartan (BENICAR) 40 MG tablet 1 p.o. daily for blood pressure instead of olmesartan HCTZ 90 tablet 1    metoprolol succinate (TOPROL XL) 50 MG extended release tablet TAKE ONE TABLET BY MOUTH EVERY DAY 90 tablet 1    traZODone (DESYREL) 100 MG tablet TAKE 2-3 TABLETS BY MOUTH AT BEDTIME 90 tablet 5    ACCU-CHEK GUIDE strip       lubiprostone (AMITIZA) 24 MCG capsule Take 1 capsule by mouth 2 times daily (with meals) 180 capsule 5    vilazodone hcl 40 MG TABS Take 1 tablet by mouth daily 90 tablet 3    levocetirizine (XYZAL) 5 MG tablet Take 1 Tablet by mouth nightly. - Oral 90 tablet 3    empagliflozin (JARDIANCE) 10 MG tablet 1 po qam for diabetes 90 tablet 1    montelukast (SINGULAIR) 10 MG tablet Take 1 tablet by mouth in the morning. 90 tablet 3    cyclobenzaprine (FLEXERIL) 10 MG tablet TAKE ONE TABLET BY MOUTH THREE TIMES A DAY AS NEEDED FOR MUSCLE SPASMS 90 tablet 3    clobetasol (TEMOVATE) 0.05 % ointment       vilazodone hcl 40 MG TABS 1 po qam with food for depression 30 tablet 5    TRESIBA FLEXTOUCH 200 UNIT/ML SOPN INJECT 50 UNITS UNDER THE SKIN DAILY 18 mL 5    clopidogrel (PLAVIX) 75 MG tablet Take 75 mg by mouth daily      pantoprazole (PROTONIX) 40 MG tablet Take 1 Tablet by mouth BID For stomach - instead of prilosec( failed priolosec and nexium and prevacid) - Oral 180 tablet 3    amLODIPine (NORVASC) 5 MG tablet Take 1 Tablet by mouth daily. ( instead of tribenzor with benicar hct) - Oral 90 tablet 5    albuterol sulfate  (90 Base) MCG/ACT inhaler INHALE TWO PUFFS BY MOUTH EVERY 6 HOURS AS NEEDED FOR WHEEZING      morphine (MSIR) 15 MG tablet Take 15 mg by mouth every 6 hours as needed.       mupirocin (BACTROBAN) 2 % nasal ointment by Nasal route 2 times daily      naloxegol (MOVANTIK) 12.5 MG TABS tablet Take 25 mg by mouth every morning (before breakfast) naloxone 4 MG/0.1ML LIQD nasal spray 4 mg      promethazine (PHENERGAN) 25 MG tablet Take 25 mg by mouth every 6 hours as needed       No current facility-administered medications for this visit. Social History     Socioeconomic History    Marital status: Unknown     Spouse name: None    Number of children: None    Years of education: None    Highest education level: None   Tobacco Use    Smoking status: Some Days     Packs/day: 1.00     Types: Cigarettes     Last attempt to quit: 2021     Years since quittin.1    Smokeless tobacco: Former     Quit date: 2018    Tobacco comments:     Quit smoking: quit   Vaping Use    Vaping Use: Never used   Substance and Sexual Activity    Alcohol use: No    Drug use: No     Types: Prescription       Family History   Problem Relation Age of Onset    Heart Disease Sister     Lung Disease Sister     Alcohol Abuse Mother     Alcohol Abuse Father     Diabetes Paternal Grandfather     Heart Disease Brother     Lung Disease Sister        Allergies   Allergen Reactions    Latex Rash     Skin raw    Ketamine      Other reaction(s): Altered Mental State-Intolerance    Acetaminophen Other (See Comments)     Pt states she can take medication    Adhesive Tape Other (See Comments)     Other reaction(s): Unknown-Unspecified  Bruising, burning  Bruising, burning      Diclofenac Sodium Other (See Comments)    Diclofenac-Misoprostol Nausea Only    Misoprostol Other (See Comments)    Nsaids Other (See Comments)     Other reaction(s): Other- (not listed) - Allergy  Not to take due to kidneys blood and pus in urine. Other reaction(s): Other- (not listed) - Allergy  NA  NA      Ropinirole Other (See Comments)     Keep pt awake    Varenicline Other (See Comments)     \"I had violent outbursts\"    Gabapentin Palpitations     Other reaction(s):  Other- (not listed) - Allergy  Upset stomach        PHYSICAL EXAMINATION:  General Appearance: Healthy appearing patient in no acute distress  Vitals reviewed. BP (!) 142/73 (Site: Right Upper Arm, Position: Sitting, Cuff Size: Medium Adult)   Pulse 71   Temp 98 °F (36.7 °C) (Oral)   Resp 21   Ht 5' (1.524 m)   Wt 140 lb 4.8 oz (63.6 kg)   SpO2 93%   BMI 27.40 kg/m²   HEENT: No oral or pharyngeal masses, ulceration or thrush noted, no sinus tenderness. Neck is supple with no thyromegaly or JVD noted. Lymph Nodes: No lymphadenopathy noted in the occipital, pre and post auricular, cervical, supra and infraclavicular, axillary, epitrochlear, inguinal, and popliteal region. Breasts: No palpable masses, nipple discharge or skin retraction  Lungs/Thorax: Clear to auscultation, no accessory muscles of respiration being used. Heart: Regular rate and rhythm, normal S1, S2, no appreciable murmurs, rubs, gallops  Abdomen: Soft, nontender, bowel sounds present, no appreciable hepatosplenomegaly, no palpable masses  Extremeties: Good pulses bilaterally, no peripheral edema. Skin: Normal skin tone with no rash, petechiae, ecchymosis noted. Musculoskeletal: No pain on palpation over bony prominence, no edema, no evidence of gout, no joint or bony deformity  Neurologic: Grossly intact        LABS/IMAGING:    Lab Results   Component Value Date/Time    WBC 12.5 10/17/2022 10:04 AM    HGB 14.3 10/17/2022 10:04 AM    HCT 44.4 10/17/2022 10:04 AM     10/17/2022 10:04 AM    MCV 85.4 10/17/2022 10:04 AM       Lab Results   Component Value Date/Time     08/08/2022 11:02 AM    K 3.0 08/08/2022 11:02 AM     08/08/2022 11:02 AM    CO2 30 08/08/2022 11:02 AM    BUN 11 08/08/2022 11:02 AM    GFRAA >60 08/08/2022 11:02 AM    GLOB 3.7 08/08/2022 11:02 AM    ALT 31 08/08/2022 11:02 AM             Above results reviewed with patient.     ASSESSMENT:  78  female ex-smoker history of DM, CKD, anxiety/depression, peptic ulcer disease, leiomyosarcoma of arm excised in 1998, hypertension/dyslipidemia, colon cancer status post partial colectomy 2013 (without need for adjuvant therapy), GERD, COPD, KATY/BSO, lumbar fusion, hernia repair, spinal cord implant and removal 2005, cervical fusion with discectomy 2011, cholecystectomy, now referred to us for persistent leukocytosis. Patient was seen by hematology myself 7/14 for same, first noticed in 4/2011 when her white count was 13.1. She had denied any infection or steroid exposure at the time. Work-up 7/1/14 included peripheral blood flow cytometry, BCR ABL translocation and JAK2 V617F testing which came back unremarkable. Patient was scheduled to follow-up with us in a few weeks however subsequently was lost to follow-up. She represents to establish care. She denies any recent infections or steroid use. Denies any B symptoms. Reports scheduled for eye surgery in the near future. We will work-up as below:    2/25/2022: Today reports some right knee discomfort, sees Ortho for this. Labs from previous visit as well as today with leukocytosis having resolved without further neutrophilia. Other cell lines normal range. Her peripheral blood smear review also came back unremarkable. Other labs included normal ESR, RF, anti-CCP, and IVAN. Given resolution of neutrophilia, her bone marrow biopsy has been deferred. We will simply monitor her counts for now, we will see her back in 4 months time. 8/8/2022: Reports doing reasonably. Some healing lacerations on her forearms which she reports from her kitten. Blood work with mild neutrophilia, no other cell lines unremarkable. Unlikely bone marrow process however patient agreeable to proceeding with bone marrow biopsy for completion. We will see her in 3 months time with repeat blood work and bone marrow biopsy results. 11/7/2022: Reports doing reasonably. Denies any complaints.   Bone marrow biopsy 10/17/2022 with normocellular marrow without evidence of bone marrow disorder (no evidence of dysplasia, normal karyotype, MPN FISH normal, myeloid molecular panel negative). Patient reassured. Her mild stable neutrophilia is likely reactive ( gives history of smoking, and seasonal allergies) rather than a bone marrow disorder. Continue regular follow-up with primary care. We will see her back in a year. 1. Leukocytosis w neutrophilia, reactive. Bone marrow biopsy negative. PLAN:  As above. Simple monitoring. RTC in 12 months with repeat labs. Thank you Dr Quinn Ralph for allowing us to paticipate in care of this pleasant patient.  Please call w/ any quesions        Kiran Lipscomb MD  [facility] [place]  Hematology Oncology  [address] [place] [address]  Office : [phone]  Fax : [phone]

## 2022-11-13 DIAGNOSIS — M62.838 TRAPEZIUS MUSCLE SPASM: ICD-10-CM

## 2022-11-14 RX ORDER — CYCLOBENZAPRINE HCL 10 MG
TABLET ORAL
Qty: 90 TABLET | Refills: 3 | Status: SHIPPED | OUTPATIENT
Start: 2022-11-14

## 2022-11-16 DIAGNOSIS — M62.838 TRAPEZIUS MUSCLE SPASM: ICD-10-CM

## 2022-11-16 RX ORDER — CYCLOBENZAPRINE HCL 10 MG
TABLET ORAL
Qty: 90 TABLET | Refills: 3 | OUTPATIENT
Start: 2022-11-16

## 2022-11-22 DIAGNOSIS — K27.9 PUD (PEPTIC ULCER DISEASE): ICD-10-CM

## 2022-11-22 RX ORDER — PANTOPRAZOLE SODIUM 40 MG/1
TABLET, DELAYED RELEASE ORAL
Qty: 180 TABLET | Refills: 1 | Status: SHIPPED | OUTPATIENT
Start: 2022-11-22

## 2022-11-29 ENCOUNTER — OFFICE VISIT (OUTPATIENT)
Dept: FAMILY MEDICINE CLINIC | Facility: CLINIC | Age: 67
End: 2022-11-29
Payer: MEDICARE

## 2022-11-29 ENCOUNTER — NURSE ONLY (OUTPATIENT)
Dept: FAMILY MEDICINE CLINIC | Facility: CLINIC | Age: 67
End: 2022-11-29

## 2022-11-29 ENCOUNTER — TELEPHONE (OUTPATIENT)
Dept: FAMILY MEDICINE CLINIC | Facility: CLINIC | Age: 67
End: 2022-11-29

## 2022-11-29 VITALS
DIASTOLIC BLOOD PRESSURE: 80 MMHG | TEMPERATURE: 97 F | RESPIRATION RATE: 16 BRPM | OXYGEN SATURATION: 98 % | HEART RATE: 74 BPM | SYSTOLIC BLOOD PRESSURE: 176 MMHG | WEIGHT: 139 LBS | BODY MASS INDEX: 27.29 KG/M2 | HEIGHT: 60 IN

## 2022-11-29 DIAGNOSIS — E11.22 TYPE 2 DIABETES MELLITUS WITH CHRONIC KIDNEY DISEASE, WITH LONG-TERM CURRENT USE OF INSULIN, UNSPECIFIED CKD STAGE (HCC): ICD-10-CM

## 2022-11-29 DIAGNOSIS — L65.9 ALOPECIA: ICD-10-CM

## 2022-11-29 DIAGNOSIS — Z12.31 ENCOUNTER FOR SCREENING MAMMOGRAM FOR BREAST CANCER: ICD-10-CM

## 2022-11-29 DIAGNOSIS — Z79.4 TYPE 2 DIABETES MELLITUS WITH CHRONIC KIDNEY DISEASE, WITH LONG-TERM CURRENT USE OF INSULIN, UNSPECIFIED CKD STAGE (HCC): ICD-10-CM

## 2022-11-29 DIAGNOSIS — Z79.4 TYPE 2 DIABETES MELLITUS WITHOUT COMPLICATION, WITH LONG-TERM CURRENT USE OF INSULIN (HCC): ICD-10-CM

## 2022-11-29 DIAGNOSIS — F33.1 MAJOR DEPRESSIVE DISORDER, RECURRENT EPISODE, MODERATE (HCC): ICD-10-CM

## 2022-11-29 DIAGNOSIS — Z79.4 TYPE 2 DIABETES MELLITUS WITH OTHER SPECIFIED COMPLICATION, WITH LONG-TERM CURRENT USE OF INSULIN (HCC): ICD-10-CM

## 2022-11-29 DIAGNOSIS — E11.21 TYPE 2 DIABETES WITH NEPHROPATHY (HCC): ICD-10-CM

## 2022-11-29 DIAGNOSIS — E11.9 TYPE 2 DIABETES MELLITUS WITHOUT COMPLICATION, WITH LONG-TERM CURRENT USE OF INSULIN (HCC): ICD-10-CM

## 2022-11-29 DIAGNOSIS — E11.69 TYPE 2 DIABETES MELLITUS WITH OTHER SPECIFIED COMPLICATION, WITH LONG-TERM CURRENT USE OF INSULIN (HCC): ICD-10-CM

## 2022-11-29 DIAGNOSIS — M70.62 GREATER TROCHANTERIC BURSITIS OF LEFT HIP: ICD-10-CM

## 2022-11-29 DIAGNOSIS — E78.2 MIXED HYPERLIPIDEMIA: ICD-10-CM

## 2022-11-29 DIAGNOSIS — M62.838 TRAPEZIUS MUSCLE SPASM: Primary | ICD-10-CM

## 2022-11-29 DIAGNOSIS — I10 PRIMARY HYPERTENSION: ICD-10-CM

## 2022-11-29 DIAGNOSIS — M54.2 NECK PAIN: ICD-10-CM

## 2022-11-29 PROCEDURE — 3078F DIAST BP <80 MM HG: CPT | Performed by: FAMILY MEDICINE

## 2022-11-29 PROCEDURE — 3017F COLORECTAL CA SCREEN DOC REV: CPT | Performed by: FAMILY MEDICINE

## 2022-11-29 PROCEDURE — G8484 FLU IMMUNIZE NO ADMIN: HCPCS | Performed by: FAMILY MEDICINE

## 2022-11-29 PROCEDURE — 3052F HG A1C>EQUAL 8.0%<EQUAL 9.0%: CPT | Performed by: FAMILY MEDICINE

## 2022-11-29 PROCEDURE — 4004F PT TOBACCO SCREEN RCVD TLK: CPT | Performed by: FAMILY MEDICINE

## 2022-11-29 PROCEDURE — 1123F ACP DISCUSS/DSCN MKR DOCD: CPT | Performed by: FAMILY MEDICINE

## 2022-11-29 PROCEDURE — G8427 DOCREV CUR MEDS BY ELIG CLIN: HCPCS | Performed by: FAMILY MEDICINE

## 2022-11-29 PROCEDURE — 99214 OFFICE O/P EST MOD 30 MIN: CPT | Performed by: FAMILY MEDICINE

## 2022-11-29 PROCEDURE — 1090F PRES/ABSN URINE INCON ASSESS: CPT | Performed by: FAMILY MEDICINE

## 2022-11-29 PROCEDURE — 3074F SYST BP LT 130 MM HG: CPT | Performed by: FAMILY MEDICINE

## 2022-11-29 PROCEDURE — 2022F DILAT RTA XM EVC RTNOPTHY: CPT | Performed by: FAMILY MEDICINE

## 2022-11-29 PROCEDURE — G8400 PT W/DXA NO RESULTS DOC: HCPCS | Performed by: FAMILY MEDICINE

## 2022-11-29 PROCEDURE — G8417 CALC BMI ABV UP PARAM F/U: HCPCS | Performed by: FAMILY MEDICINE

## 2022-11-29 RX ORDER — OLMESARTAN MEDOXOMIL 40 MG/1
TABLET ORAL
Qty: 90 TABLET | Refills: 1 | Status: SHIPPED | OUTPATIENT
Start: 2022-11-29

## 2022-11-29 RX ORDER — BLOOD-GLUCOSE,RECEIVER,CONT
EACH MISCELLANEOUS
Qty: 1 EACH | Refills: 1 | Status: SHIPPED | OUTPATIENT
Start: 2022-11-29

## 2022-11-29 RX ORDER — BLOOD-GLUCOSE TRANSMITTER
EACH MISCELLANEOUS
Qty: 6 EACH | Refills: 11 | Status: SHIPPED | OUTPATIENT
Start: 2022-11-29

## 2022-11-29 RX ORDER — BLOOD-GLUCOSE SENSOR
EACH MISCELLANEOUS
Qty: 6 EACH | Refills: 11 | Status: SHIPPED | OUTPATIENT
Start: 2022-11-29

## 2022-11-29 RX ORDER — INSULIN LISPRO 100 [IU]/ML
INJECTION, SOLUTION INTRAVENOUS; SUBCUTANEOUS
Qty: 5 ADJUSTABLE DOSE PRE-FILLED PEN SYRINGE | Refills: 1 | Status: SHIPPED | OUTPATIENT
Start: 2022-11-29 | End: 2022-11-29 | Stop reason: SDUPTHER

## 2022-11-29 RX ORDER — METOPROLOL SUCCINATE 50 MG/1
TABLET, EXTENDED RELEASE ORAL
Qty: 90 TABLET | Refills: 1 | Status: SHIPPED | OUTPATIENT
Start: 2022-11-29

## 2022-11-29 RX ORDER — INSULIN LISPRO 100 [IU]/ML
INJECTION, SOLUTION INTRAVENOUS; SUBCUTANEOUS
Qty: 5 ADJUSTABLE DOSE PRE-FILLED PEN SYRINGE | Refills: 1 | Status: SHIPPED | OUTPATIENT
Start: 2022-11-29

## 2022-11-29 RX ORDER — ROSUVASTATIN CALCIUM 20 MG/1
TABLET, COATED ORAL
Qty: 30 TABLET | Refills: 5 | Status: SHIPPED | OUTPATIENT
Start: 2022-11-29

## 2022-11-29 ASSESSMENT — PATIENT HEALTH QUESTIONNAIRE - PHQ9
5. POOR APPETITE OR OVEREATING: 3
SUM OF ALL RESPONSES TO PHQ QUESTIONS 1-9: 13
SUM OF ALL RESPONSES TO PHQ QUESTIONS 1-9: 13
10. IF YOU CHECKED OFF ANY PROBLEMS, HOW DIFFICULT HAVE THESE PROBLEMS MADE IT FOR YOU TO DO YOUR WORK, TAKE CARE OF THINGS AT HOME, OR GET ALONG WITH OTHER PEOPLE: 1
SUM OF ALL RESPONSES TO PHQ QUESTIONS 1-9: 13
3. TROUBLE FALLING OR STAYING ASLEEP: 3
SUM OF ALL RESPONSES TO PHQ QUESTIONS 1-9: 13
4. FEELING TIRED OR HAVING LITTLE ENERGY: 3
1. LITTLE INTEREST OR PLEASURE IN DOING THINGS: 0
8. MOVING OR SPEAKING SO SLOWLY THAT OTHER PEOPLE COULD HAVE NOTICED. OR THE OPPOSITE, BEING SO FIGETY OR RESTLESS THAT YOU HAVE BEEN MOVING AROUND A LOT MORE THAN USUAL: 0
7. TROUBLE CONCENTRATING ON THINGS, SUCH AS READING THE NEWSPAPER OR WATCHING TELEVISION: 3
6. FEELING BAD ABOUT YOURSELF - OR THAT YOU ARE A FAILURE OR HAVE LET YOURSELF OR YOUR FAMILY DOWN: 0
SUM OF ALL RESPONSES TO PHQ9 QUESTIONS 1 & 2: 1
2. FEELING DOWN, DEPRESSED OR HOPELESS: 1
9. THOUGHTS THAT YOU WOULD BE BETTER OFF DEAD, OR OF HURTING YOURSELF: 0

## 2022-11-29 ASSESSMENT — ENCOUNTER SYMPTOMS
BLURRED VISION: 0
VISUAL CHANGE: 0

## 2022-11-29 NOTE — TELEPHONE ENCOUNTER
Prescription(s) refilled  It (they) has been escribed to the pharmacy. Requested Prescriptions     Signed Prescriptions Disp Refills    insulin lispro, 1 Unit Dial, (HUMALOG KWIKPEN) 100 UNIT/ML SOPN 5 Adjustable Dose Pre-filled Pen Syringe 1     Sig: Use 2-16 units QID (at mealtime and bedtime) PRN elevated blood sugar     Authorizing Provider: TAB CALL     No orders of the defined types were placed in this encounter. ICD-10-CM    1.  Type 2 diabetes mellitus with chronic kidney disease, with long-term current use of insulin, unspecified CKD stage (HCC)  E11.22 insulin lispro, 1 Unit Dial, (HUMALOG KWIKPEN) 100 UNIT/ML SOPN    Z79.4

## 2022-11-29 NOTE — PROGRESS NOTES
HISTORY OF PRESENT ILLNESS  Chief Complaint   Patient presents with    Diabetes    Neck Pain     Has been going on since last visit       Hair/Scalp Problem     Losing hair       Blood sugar is up and down. Not eating right. Usually takes her insulin around lunch time but sometimes forgets and gets it later in the day. Heme/ onc released her for a year. Neck hurts on the left in the trapezius area. Did find a pillow that she can sleep on  When she is up it hurts. The left hip at the greater trochanteric bursa  hurts. Gets nauseated and then pain in the LLQ hurts. No blood in the stool. Feels like depression med is ok. Taking all bp meds in the morning    Feels like her depression is ok. Previously said, \" Added collegen and peptides and stool softeners. Was having large big stools that were painful     Does have spoon nails and sometimes when they start growing out on her toes they can cut the toe beside it. Nails splitting at times. Dr. Tiffanie Mayberry found to small nodules on her thyroid over 10 years ago. Never did follow up on that. Derm froze some things on her scalp. Said that there was nothing that neede to be done for that. Hands are in bad shape from the psoriasis. Cracks and they don't heal well. Thought that her depression medication was working and still thinks that it does. Will get up and the night and start doing something and gets distracted and then doing something else and ... May be on 40 mg of viibryd. Eats a lot of popcorn.  \"    LUBIPROSTONE 24 MCG PO CAPS - increase to 2 times a day with meals for constipation     PHQ-9  Little interest or pleasure in doing things: Not at all  Feeling down, depressed, or hopeless: Several days  Trouble falling or staying asleep, or sleeping too much: Nearly every day  Feeling tired or having little energy: Nearly every day  Poor appetite or overeating: Nearly every day  Feeling bad about yourself - or that you are a failure or have let yourself or your family down: Not at all  Trouble concentrating on things, such as reading the newspaper or watching television: Nearly every day  Moving or speaking so slowly that other people could have noticed. Or the opposite - being so fidgety or restless that you have been moving around a lot more than usual: Not at all  Thoughts that you would be better off dead, or of hurting yourself in some way: Not at all  PHQ-9 Total Score: 13  If you checked off any problems, how difficult have these problems made it for you to do your work, take care of things at home, or get along with other people?: Somewhat difficult    Continuous Glucose Monitoring Documentation    The patient does have diabetes and is using insulin. She uses her blood glucose meter to perform frequent blood glucose tests. She usually test 6 times a day. She does use multiple daily doses of insulin. Due to frequent fluctuations in her blood sugar, she uses her blood sugar level to frequently adjust her insulin dose. Diabetes Mellitus: Patient presents for follow up of diabetes. Symptoms: taking medications as instructed, no medication side effects noted, no TIA's, no chest pain on exertion, no dyspnea on exertion, no swelling of ankles. Symptoms have reasonably well controlled. Patient denies visual disturbances. Evaluation to date has been included below. Home sugars: BGs consistently in an acceptable range. Treatment to date: medications. Diabetic issues reviewed with her: low cholesterol diet, weight control and daily exercise discussed.   Key Antihyperglycemic Medications            empagliflozin (JARDIANCE) 25 MG tablet (Taking)    Si po qam for blood sugar instead of the 10 mg dose    insulin lispro, 1 Unit Dial, (HUMALOG KWIKPEN) 100 UNIT/ML SOPN (Taking/Discontinued)    Sig: Use QID (at mealtime and bedtime) PRN elevated blood sugar    Reason for Discontinue: REORDER    TRESIBA FLEXTOUCH 200 UNIT/ML SOPN (Taking)    Sig: INJECT 50 UNITS UNDER THE SKIN DAILY           G[facility] CAD CHF Meds            olmesartan (BENICAR) 40 MG tablet (Taking)    Si p.o. daily for blood pressure instead of olmesartan HCTZ    metoprolol succinate (TOPROL XL) 50 MG extended release tablet (Taking)    Sig: TAKE ONE TABLET BY MOUTH EVERY night    rosuvastatin (CRESTOR) 20 MG tablet (Taking)    Sig: TAKE ONE TABLET BY MOUTH NIGHTLY    amLODIPine (NORVASC) 5 MG tablet (Taking)    Sig: Take 1 Tablet by mouth daily. ( instead of tribenzor with benicar hct) - Oral           Lab Results   Component Value Date    LABA1C 8.6 (H) 2022     Lab Results   Component Value Date     2022      Lab Results   Component Value Date    CREATININE 0.80 2022      No results found for: LABMICR, [id number]   Wt Readings from Last 3 Encounters:   22 139 lb (63 kg)   22 140 lb 4.8 oz (63.6 kg)   10/17/22 140 lb (63.5 kg)      BP Readings from Last 3 Encounters:   22 (!) 176/80   22 (!) 142/73   10/17/22 (!) 160/68          Diabetes  There are no hypoglycemic associated symptoms. Pertinent negatives for hypoglycemia include no dizziness, headaches, nervousness/anxiousness or tremors. Pertinent negatives for diabetes include no blurred vision, no chest pain, no fatigue, no foot paresthesias, no foot ulcerations, no polydipsia, no polyphagia, no polyuria, no visual change, no weakness and no weight loss. There are no hypoglycemic complications. Symptoms are stable. Pertinent negatives for diabetic complications include no autonomic neuropathy. Risk factors for coronary artery disease include diabetes mellitus, dyslipidemia, hypertension and post-menopausal.   Neck Pain   This is a chronic problem. The current episode started more than 1 month ago. The problem occurs constantly. The problem has been unchanged. The pain is associated with nothing. The pain is present in the left side.  The symptoms are aggravated by position. Pertinent negatives include no chest pain, fever, headaches, numbness, visual change, weakness or weight loss. Review of Systems   Constitutional:  Negative for activity change, appetite change, chills, fatigue, fever and weight loss. HENT:  Negative for congestion and rhinorrhea. Eyes:  Negative for blurred vision. Respiratory:  Negative for cough, shortness of breath and wheezing. Cardiovascular:  Negative for chest pain. Gastrointestinal:  Negative for abdominal pain, constipation, diarrhea, nausea and vomiting. Endocrine: Negative for polydipsia, polyphagia and polyuria. Genitourinary:  Negative for dysuria, flank pain, frequency, pelvic pain and urgency. Musculoskeletal:  Positive for arthralgias, back pain, myalgias and neck pain. Negative for gait problem, joint swelling and neck stiffness. Skin:  Negative for color change, rash and wound. Neurological:  Negative for dizziness, tremors, weakness, numbness and headaches. Psychiatric/Behavioral:  Negative for dysphoric mood. The patient is not nervous/anxious.        Patient Active Problem List   Diagnosis    Back pain    Renal insufficiency    PUD (peptic ulcer disease)    Mixed hyperlipidemia    Neutrophilia    DM (diabetes mellitus) ([facility] [address]    Tremor    Melanocytic nevus of trunk    Falls frequently    Bacteremia    Hypokalemia    Coronary artery calcification    Major depressive disorder, recurrent episode, moderate (HCC)    Chronic pain    Hypertension    Osteoarthritis of both knees    Murmur, cardiac    At risk for allergic reaction to medication    Isolated proteinuria without specific morphologic lesion    Arthritis of left knee    History of colon cancer    GERD (gastroesophageal reflux disease)    MDD (major depressive disorder)    Scoliosis, congenital    Tobacco abuse    Osteoarthritis    CAD (coronary artery disease)    Insomnia    Acute encephalopathy    Umbilical hernia without obstruction and without gangrene    Type 2 diabetes with nephropathy (HCC)    Cancer (HCC)    Chronic narcotic dependence (HCC)    Constipation    Preventative health care    Edema of both legs    Thyroid nodule    RBBB    Post-traumatic osteoarthritis of right knee    Attention deficit disorder (ADD) without hyperactivity    Type 2 diabetes mellitus with chronic kidney disease, with long-term current use of insulin (HCC)    Type 2 diabetes mellitus without complication, with long-term current use of insulin ([address]    Essential hypertension with goal blood pressure less than 140/90    Chronic obstructive pulmonary disease (HCC)    Microalbuminuria    Memory loss    Neuropathy    Peripheral artery occlusion (HCC)    Toe cyanosis    Paresthesia of both legs    Neural foraminal stenosis of lumbar spine    DDD (degenerative disc disease), lumbar    Pain of fifth toe    Scalp lesion    Right foot pain    Wheeze    Urethral stenosis    Mood disorder (HCC)    Chronic nonseasonal allergic rhinitis due to pollen    Trapezius muscle spasm         Blood pressure (!) 176/80, pulse 74, temperature 97 °F (36.1 °C), temperature source Temporal, resp. rate 16, height 5' (1.524 m), weight 139 lb (63 kg), SpO2 98 %. Pain Scale: 10 - Worst pain ever/10 Pain Location: Neck  Body mass index is 27.15 kg/m². Physical Exam  Constitutional:       General: She is not in acute distress. Appearance: Normal appearance. She is normal weight. She is not toxic-appearing. HENT:      Head: Normocephalic and atraumatic. Eyes:      Extraocular Movements: Extraocular movements intact. Conjunctiva/sclera: Conjunctivae normal.      Pupils: Pupils are equal, round, and reactive to light. Cardiovascular:      Rate and Rhythm: Normal rate and regular rhythm. Pulses: Normal pulses. Heart sounds: Normal heart sounds. No murmur heard. No friction rub. No gallop. Pulmonary:      Effort: Pulmonary effort is normal. No respiratory distress. Breath sounds: Normal breath sounds. No wheezing or rales. Musculoskeletal:      Right shoulder: Normal.      Left shoulder: Normal.      Right upper arm: Normal.      Left upper arm: Normal.      Right elbow: Normal.      Left elbow: Normal.      Right forearm: Normal.      Left forearm: Normal.      Cervical back: Tenderness present. No swelling, edema, deformity, erythema, signs of trauma, spasms or bony tenderness. No pain with movement. Normal range of motion. Skin:     General: Skin is warm and dry. Neurological:      General: No focal deficit present. Mental Status: She is alert. Cranial Nerves: No cranial nerve deficit. Sensory: Sensation is intact. No sensory deficit. Motor: Motor function is intact. No weakness. Coordination: Coordination normal.      Deep Tendon Reflexes:      Reflex Scores:       Bicep reflexes are 2+ on the right side and 2+ on the left side. Brachioradialis reflexes are 2+ on the right side and 2+ on the left side. Psychiatric:         Mood and Affect: Mood normal.         Behavior: Behavior normal.         Thought Content: Thought content normal.         Judgment: Judgment normal.            ASSESSMENT and PLAN   Diagnosis Orders   1. Trapezius muscle spasm  [facility]      2. Major depressive disorder, recurrent episode, moderate (HCC)        3. Type 2 diabetes mellitus with other specified complication, with long-term current use of insulin ([address]        4. Type 2 diabetes with nephropathy ([facility] [address]        5.  Type 2 diabetes mellitus with chronic kidney disease, with long-term current use of insulin, unspecified CKD stage (HCC)  empagliflozin (JARDIANCE) 25 MG tablet    Continuous Blood Gluc  (DEXCOM G6 ) ALVARO    Continuous Blood Gluc Sensor (DEXCOM G6 SENSOR) MISC    Continuous Blood Gluc Transmit (DEXCOM G6 TRANSMITTER) MISC    Hemoglobin A1C    DISCONTINUED: insulin lispro, 1 Unit Dial, (HUMALOG KWIKPEN) 100 UNIT/ML SOPN      6. Type 2 diabetes mellitus without complication, with long-term current use of insulin ([address]        7. Primary hypertension  olmesartan (BENICAR) 40 MG tablet    metoprolol succinate (TOPROL XL) 50 MG extended release tablet      8. Mixed hyperlipidemia  rosuvastatin (CRESTOR) 20 MG tablet    Lipid Panel      9. Greater trochanteric bursitis of left hip  [facility]      10. Alopecia  TSH      11. Neck pain  [facility]      12. Encounter for screening mammogram for breast cancer  [facility] DIGITAL SCREEN BILATERAL          Reviewed medications and side effects in detail    The following additional handouts were provided to patient:    Supplemental Scale / Sliding Scale    Humalog - Use at mealtime and bedtime for elevated blood sugar (up to 16 units 4 times a day)    Move metoprolol to before bed. Blood pressure log was given to the patient. Discussed how to use it. Bring back with her  at her   next appointment. Her other chronic conditions are stable at this time. She is stable on the current treatment and tolerating it well. No significant sides effects. Will not adjust therapy at this time, unless noted above. We will continue to monitor for any problems. Medications refilled and lab work has been ordered where needed. Reviewed medications are explained including any potential interactions or side effects in detail. The patient's questions were answered. She  understands the above and has no further questions. Further workup and treatment should be done if symptoms persist, worsen or new symptoms occur. She  will call to notify us of any problems, complications or worsening symptoms. Follow-up and Dispositions    Return in about 3 months (around 2/28/2023) for labs today, EOV. There are no Patient Instructions on file for this visit.     (Some details in this note may have been created with speech-recognition software. Some errors in speech recognition may have occurred.    Most of those have been corrected but some may have been missed.)         Charley Miller MD  [address] [facility]  Phone [phone]

## 2022-11-29 NOTE — TELEPHONE ENCOUNTER
Received call from the pharmacy states that they need clarification on the patients insulin on how many units each injection.  They are requesting this be sent

## 2022-11-30 LAB
CHOLEST SERPL-MCNC: 97 MG/DL
EST. AVERAGE GLUCOSE BLD GHB EST-MCNC: 200 MG/DL
HBA1C MFR BLD: 8.6 % (ref 4.8–5.6)
HDLC SERPL-MCNC: 43 MG/DL (ref 40–60)
HDLC SERPL: 2.3
LDLC SERPL CALC-MCNC: 26.4 MG/DL
TRIGL SERPL-MCNC: 138 MG/DL (ref 35–150)
TSH, 3RD GENERATION: 0.99 UIU/ML (ref 0.36–3.74)
VLDLC SERPL CALC-MCNC: 27.6 MG/DL (ref 6–23)

## 2022-12-08 ASSESSMENT — ENCOUNTER SYMPTOMS
DIARRHEA: 0
RHINORRHEA: 0
COLOR CHANGE: 0
NAUSEA: 0
ABDOMINAL PAIN: 0
COUGH: 0
CONSTIPATION: 0
BACK PAIN: 1
WHEEZING: 0
VOMITING: 0
SHORTNESS OF BREATH: 0

## 2022-12-19 ENCOUNTER — TELEPHONE (OUTPATIENT)
Dept: FAMILY MEDICINE CLINIC | Facility: CLINIC | Age: 67
End: 2022-12-19

## 2022-12-19 DIAGNOSIS — E11.22 TYPE 2 DIABETES MELLITUS WITH CHRONIC KIDNEY DISEASE, WITH LONG-TERM CURRENT USE OF INSULIN, UNSPECIFIED CKD STAGE (HCC): ICD-10-CM

## 2022-12-19 DIAGNOSIS — Z79.4 TYPE 2 DIABETES MELLITUS WITH CHRONIC KIDNEY DISEASE, WITH LONG-TERM CURRENT USE OF INSULIN, UNSPECIFIED CKD STAGE (HCC): ICD-10-CM

## 2022-12-19 RX ORDER — BLOOD-GLUCOSE SENSOR
EACH MISCELLANEOUS
Qty: 6 EACH | Refills: 11 | Status: SHIPPED | OUTPATIENT
Start: 2022-12-19

## 2022-12-19 RX ORDER — BLOOD-GLUCOSE,RECEIVER,CONT
EACH MISCELLANEOUS
Qty: 1 EACH | Refills: 1 | Status: SHIPPED | OUTPATIENT
Start: 2022-12-19

## 2022-12-19 RX ORDER — BLOOD-GLUCOSE TRANSMITTER
EACH MISCELLANEOUS
Qty: 6 EACH | Refills: 11 | Status: SHIPPED | OUTPATIENT
Start: 2022-12-19

## 2022-12-19 NOTE — TELEPHONE ENCOUNTER
I have printed The prescription for the patient. Requested Prescriptions     Signed Prescriptions Disp Refills    Continuous Blood Gluc  (DEXCOM G6 ) ALVARO 1 each 1     Sig: Check Blood Sugar 6 times a day and adjust insulin     Authorizing Provider: TAB CALL    Continuous Blood Gluc Transmit (DEXCOM G6 TRANSMITTER) MISC 6 each 11     Sig: Check Blood Sugar 6 times a day and adjust insulin     Authorizing Provider: TAB CALL    Continuous Blood Gluc Sensor (DEXCOM G6 SENSOR) MISC 6 each 11     Sig: Check Blood Sugar 6 times a day and adjust insulin     Authorizing Provider: TAB CALL      No orders of the defined types were placed in this encounter. No primary diagnosis found.

## 2022-12-19 NOTE — TELEPHONE ENCOUNTER
[facility] from [facility] called she needs a Dexcom order sent in for her   please fax order to [phone]

## 2022-12-21 ENCOUNTER — HOSPITAL ENCOUNTER (OUTPATIENT)
Dept: MRI IMAGING | Age: 67
Discharge: HOME | End: 2022-12-24
Payer: MEDICARE

## 2022-12-21 DIAGNOSIS — M54.12 BRACHIAL NEURITIS: ICD-10-CM

## 2022-12-21 PROCEDURE — 72141 MRI NECK SPINE W/O DYE: CPT

## 2022-12-28 ENCOUNTER — TELEPHONE (OUTPATIENT)
Dept: FAMILY MEDICINE CLINIC | Facility: CLINIC | Age: 67
End: 2022-12-28

## 2022-12-28 NOTE — TELEPHONE ENCOUNTER
[facility] stating she called on 12/27   About a possible MRSA issue,  States there is a sore in her right nostril that has been present approx 3 days. Please advise.

## 2022-12-28 NOTE — TELEPHONE ENCOUNTER
Left msg informing the patient that we do not have any openings and this issue does not need to wait to go onto an urgent care to be evaluated ASAP

## 2023-01-06 ENCOUNTER — OFFICE VISIT (OUTPATIENT)
Dept: FAMILY MEDICINE CLINIC | Facility: CLINIC | Age: 68
End: 2023-01-06
Payer: MEDICARE

## 2023-01-06 VITALS
HEIGHT: 60 IN | OXYGEN SATURATION: 96 % | BODY MASS INDEX: 27.29 KG/M2 | TEMPERATURE: 97.2 F | DIASTOLIC BLOOD PRESSURE: 70 MMHG | SYSTOLIC BLOOD PRESSURE: 168 MMHG | RESPIRATION RATE: 16 BRPM | WEIGHT: 139 LBS | HEART RATE: 96 BPM

## 2023-01-06 DIAGNOSIS — Z79.4 TYPE 2 DIABETES MELLITUS WITH CHRONIC KIDNEY DISEASE, WITH LONG-TERM CURRENT USE OF INSULIN, UNSPECIFIED CKD STAGE (HCC): Primary | ICD-10-CM

## 2023-01-06 DIAGNOSIS — F11.20 CHRONIC NARCOTIC DEPENDENCE (HCC): ICD-10-CM

## 2023-01-06 DIAGNOSIS — I10 PRIMARY HYPERTENSION: ICD-10-CM

## 2023-01-06 DIAGNOSIS — L65.9 ALOPECIA: ICD-10-CM

## 2023-01-06 DIAGNOSIS — E78.2 MIXED HYPERLIPIDEMIA: ICD-10-CM

## 2023-01-06 DIAGNOSIS — J44.9 CHRONIC OBSTRUCTIVE PULMONARY DISEASE, UNSPECIFIED COPD TYPE (HCC): ICD-10-CM

## 2023-01-06 DIAGNOSIS — R11.0 NAUSEA: ICD-10-CM

## 2023-01-06 DIAGNOSIS — F33.1 MAJOR DEPRESSIVE DISORDER, RECURRENT EPISODE, MODERATE (HCC): ICD-10-CM

## 2023-01-06 DIAGNOSIS — E11.22 TYPE 2 DIABETES MELLITUS WITH CHRONIC KIDNEY DISEASE, WITH LONG-TERM CURRENT USE OF INSULIN, UNSPECIFIED CKD STAGE (HCC): Primary | ICD-10-CM

## 2023-01-06 DIAGNOSIS — K27.9 PUD (PEPTIC ULCER DISEASE): ICD-10-CM

## 2023-01-06 DIAGNOSIS — C18.9 MALIGNANT NEOPLASM OF COLON, UNSPECIFIED PART OF COLON (HCC): ICD-10-CM

## 2023-01-06 DIAGNOSIS — F51.01 PRIMARY INSOMNIA: ICD-10-CM

## 2023-01-06 DIAGNOSIS — J34.89 INTERNAL NASAL LESION: ICD-10-CM

## 2023-01-06 PROCEDURE — G8427 DOCREV CUR MEDS BY ELIG CLIN: HCPCS | Performed by: FAMILY MEDICINE

## 2023-01-06 PROCEDURE — 4004F PT TOBACCO SCREEN RCVD TLK: CPT | Performed by: FAMILY MEDICINE

## 2023-01-06 PROCEDURE — 3078F DIAST BP <80 MM HG: CPT | Performed by: FAMILY MEDICINE

## 2023-01-06 PROCEDURE — 3074F SYST BP LT 130 MM HG: CPT | Performed by: FAMILY MEDICINE

## 2023-01-06 PROCEDURE — 2022F DILAT RTA XM EVC RTNOPTHY: CPT | Performed by: FAMILY MEDICINE

## 2023-01-06 PROCEDURE — G8484 FLU IMMUNIZE NO ADMIN: HCPCS | Performed by: FAMILY MEDICINE

## 2023-01-06 PROCEDURE — 3017F COLORECTAL CA SCREEN DOC REV: CPT | Performed by: FAMILY MEDICINE

## 2023-01-06 PROCEDURE — 1090F PRES/ABSN URINE INCON ASSESS: CPT | Performed by: FAMILY MEDICINE

## 2023-01-06 PROCEDURE — G8417 CALC BMI ABV UP PARAM F/U: HCPCS | Performed by: FAMILY MEDICINE

## 2023-01-06 PROCEDURE — 1123F ACP DISCUSS/DSCN MKR DOCD: CPT | Performed by: FAMILY MEDICINE

## 2023-01-06 PROCEDURE — 3046F HEMOGLOBIN A1C LEVEL >9.0%: CPT | Performed by: FAMILY MEDICINE

## 2023-01-06 PROCEDURE — G8400 PT W/DXA NO RESULTS DOC: HCPCS | Performed by: FAMILY MEDICINE

## 2023-01-06 PROCEDURE — 99214 OFFICE O/P EST MOD 30 MIN: CPT | Performed by: FAMILY MEDICINE

## 2023-01-06 PROCEDURE — 3023F SPIROM DOC REV: CPT | Performed by: FAMILY MEDICINE

## 2023-01-06 RX ORDER — DIAZEPAM 5 MG/1
TABLET ORAL
COMMUNITY
Start: 2023-01-05

## 2023-01-06 RX ORDER — OLMESARTAN MEDOXOMIL 40 MG/1
TABLET ORAL
Qty: 90 TABLET | Refills: 1 | Status: SHIPPED | OUTPATIENT
Start: 2023-01-06

## 2023-01-06 RX ORDER — TRAZODONE HYDROCHLORIDE 100 MG/1
TABLET ORAL
Qty: 90 TABLET | Refills: 5 | Status: SHIPPED | OUTPATIENT
Start: 2023-01-06

## 2023-01-06 RX ORDER — PROMETHAZINE HYDROCHLORIDE 25 MG/1
25 TABLET ORAL EVERY 6 HOURS PRN
Qty: 30 TABLET | Refills: 5 | Status: SHIPPED | OUTPATIENT
Start: 2023-01-06

## 2023-01-06 RX ORDER — ROSUVASTATIN CALCIUM 20 MG/1
TABLET, COATED ORAL
Qty: 30 TABLET | Refills: 5 | Status: SHIPPED | OUTPATIENT
Start: 2023-01-06

## 2023-01-06 RX ORDER — VILAZODONE HYDROCHLORIDE 40 MG/1
TABLET ORAL
Qty: 90 TABLET | Refills: 5 | Status: SHIPPED | OUTPATIENT
Start: 2023-01-06

## 2023-01-06 RX ORDER — LUBIPROSTONE 24 UG/1
CAPSULE, GELATIN COATED ORAL
COMMUNITY
Start: 2022-12-23

## 2023-01-06 RX ORDER — METOPROLOL SUCCINATE 50 MG/1
TABLET, EXTENDED RELEASE ORAL
Qty: 90 TABLET | Refills: 1 | Status: SHIPPED | OUTPATIENT
Start: 2023-01-06

## 2023-01-06 RX ORDER — PANTOPRAZOLE SODIUM 40 MG/1
TABLET, DELAYED RELEASE ORAL
Qty: 180 TABLET | Refills: 1 | Status: SHIPPED | OUTPATIENT
Start: 2023-01-06

## 2023-01-06 RX ORDER — AMLODIPINE BESYLATE 5 MG/1
TABLET ORAL
Qty: 90 TABLET | Refills: 5 | Status: SHIPPED | OUTPATIENT
Start: 2023-01-06

## 2023-01-06 ASSESSMENT — PATIENT HEALTH QUESTIONNAIRE - PHQ9
1. LITTLE INTEREST OR PLEASURE IN DOING THINGS: 0
SUM OF ALL RESPONSES TO PHQ QUESTIONS 1-9: 0
SUM OF ALL RESPONSES TO PHQ QUESTIONS 1-9: 0
2. FEELING DOWN, DEPRESSED OR HOPELESS: 0
SUM OF ALL RESPONSES TO PHQ QUESTIONS 1-9: 0
SUM OF ALL RESPONSES TO PHQ QUESTIONS 1-9: 0
SUM OF ALL RESPONSES TO PHQ9 QUESTIONS 1 & 2: 0

## 2023-01-06 ASSESSMENT — ENCOUNTER SYMPTOMS
COUGH: 0
DIARRHEA: 0
RHINORRHEA: 0
SHORTNESS OF BREATH: 0
EYE PAIN: 0
SORE THROAT: 0
VOMITING: 0
NAIL CHANGES: 0

## 2023-01-06 NOTE — PROGRESS NOTES
HISTORY OF PRESENT ILLNESS  Chief Complaint   Patient presents with    Sore     Pt states she has a sore on the inside of her nose; right side ; 1 week      Pt states she has a sore on the inside of her nose; right side ; 1 week   was using a nose rolan and then after that had a sore. Using a Qtip with campho on it. They plan to do an epidural in the neck on 23    Previously said, \" Blood sugar is up and down. Not eating right. Usually takes her insulin around lunch time but sometimes forgets and gets it later in the day. Heme/ onc released her for a year. Neck hurts on the left in the trapezius area. Did find a pillow that she can sleep on  When she is up it hurts. The left hip at the greater trochanteric bursa  hurts. Gets nauseated and then pain in the LLQ hurts. No blood in the stool. Feels like depression med is ok. Taking all bp meds in the morning     Feels like her depression is ok. \"     Diabetes Mellitus: Patient presents for follow up of diabetes. Symptoms: taking medications as instructed, no medication side effects noted, no TIA's, no chest pain on exertion, no dyspnea on exertion, no swelling of ankles. Symptoms have poorly controlled. Patient denies visual disturbances. Evaluation to date has been included below. Home sugars: BGs consistently in an acceptable range. Treatment to date: medications. Diabetic issues reviewed with her: low cholesterol diet, weight control and daily exercise discussed.   Key Antihyperglycemic Medications            empagliflozin (JARDIANCE) 25 MG tablet (Taking)    Si po qam for blood sugar instead of the 10 mg dose    insulin lispro, 1 Unit Dial, (HUMALOG KWIKPEN) 100 UNIT/ML SOPN (Taking/Discontinued)    Sig: Use 2-16 units QID (at mealtime and bedtime) PRN elevated blood sugar    TRESIBA FLEXTOUCH 200 UNIT/ML SOPN (Taking)    Sig: INJECT 50 UNITS UNDER THE SKIN DAILY           G[facility] CAD CHF Meds            olmesartan (BENICAR) 40 MG tablet (Taking)    Si p.o. daily for blood pressure instead of olmesartan HCTZ    rosuvastatin (CRESTOR) 20 MG tablet (Taking)    Sig: TAKE ONE TABLET BY MOUTH NIGHTLY    metoprolol succinate (TOPROL XL) 50 MG extended release tablet (Taking)    Sig: TAKE ONE TABLET BY MOUTH EVERY night    amLODIPine (NORVASC) 5 MG tablet (Taking)    Sig: Take 1 Tablet by mouth daily. ( instead of tribenzor with benicar hct) - Oral           Lab Results   Component Value Date    LABA1C 8.6 (H) 2022     Lab Results   Component Value Date     2022      Lab Results   Component Value Date    CREATININE 0.80 2022      No results found for: LABMICR, [id number]   Wt Readings from Last 3 Encounters:   23 139 lb (63 kg)   22 139 lb (63 kg)   22 139 lb (63 kg)      BP Readings from Last 3 Encounters:   23 (!) 168/70   22 (!) 176/80   22 (!) 142/73        Skin Problem  This is a recurrent problem. The current episode started 1 to 4 weeks ago. The problem is unchanged. The rash is characterized by swelling. Pertinent negatives include no anorexia, congestion, cough, diarrhea, eye pain, facial edema, fatigue, fever, joint pain, nail changes, rhinorrhea, shortness of breath, sore throat or vomiting. Past treatments include analgesics. The treatment provided moderate relief. Review of Systems   Constitutional:  Negative for activity change, appetite change, chills, fatigue and fever. HENT:  Negative for congestion, rhinorrhea and sore throat. Eyes:  Negative for pain. Respiratory:  Negative for cough, shortness of breath and wheezing. Gastrointestinal:  Negative for abdominal pain, anorexia, constipation, diarrhea, nausea and vomiting. Genitourinary:  Negative for dysuria. Musculoskeletal:  Negative for arthralgias, joint pain and myalgias. Skin:  Negative for nail changes. Neurological:  Negative for dizziness and headaches. Psychiatric/Behavioral:  Negative for dysphoric mood. The patient is not nervous/anxious.        Patient Active Problem List   Diagnosis    Back pain    Renal insufficiency    PUD (peptic ulcer disease)    Mixed hyperlipidemia    Neutrophilia    DM (diabetes mellitus) ([address]    Tremor    Melanocytic nevus of trunk    Falls frequently    Bacteremia    Hypokalemia    Coronary artery calcification    Major depressive disorder, recurrent episode, moderate (HCC)    Chronic pain    Hypertension    Osteoarthritis of both knees    Murmur, cardiac    At risk for allergic reaction to medication    Isolated proteinuria without specific morphologic lesion    Arthritis of left knee    History of colon cancer    GERD (gastroesophageal reflux disease)    MDD (major depressive disorder)    Scoliosis, congenital    Tobacco abuse    Osteoarthritis    CAD (coronary artery disease)    Insomnia    Acute encephalopathy    Umbilical hernia without obstruction and without gangrene    Type 2 diabetes with nephropathy (HCC)    Cancer (HCC)    Chronic narcotic dependence (HCC)    Constipation    Preventative health care    Edema of both legs    Thyroid nodule    RBBB    Post-traumatic osteoarthritis of right knee    Attention deficit disorder (ADD) without hyperactivity    Type 2 diabetes mellitus with chronic kidney disease, with long-term current use of insulin ([address]    Type 2 diabetes mellitus without complication, with long-term current use of insulin ([address]    Essential hypertension with goal blood pressure less than 140/90    Chronic obstructive pulmonary disease (HCC)    Microalbuminuria    Memory loss    Neuropathy    Peripheral artery occlusion (HCC)    Toe cyanosis    Paresthesia of both legs    Neural foraminal stenosis of lumbar spine    DDD (degenerative disc disease), lumbar    Pain of fifth toe    Scalp lesion    Right foot pain    Wheeze    Urethral stenosis    Mood disorder ([address]    Chronic nonseasonal allergic rhinitis due to pollen    Trapezius muscle spasm    Malignant neoplasm of colon, unspecified part of colon (HCC)         Blood pressure (!) 168/70, pulse 96, temperature 97.2 °F (36.2 °C), temperature source Temporal, resp. rate 16, height 5' (1.524 m), weight 139 lb (63 kg), SpO2 96 %. Pain Scale: 5/10 Pain Location: Neck  Body mass index is 27.15 kg/m². Physical Exam  Vitals and nursing note reviewed. Constitutional:       General: She is not in acute distress. Appearance: Normal appearance. She is normal weight. She is not toxic-appearing. HENT:      Head: Normocephalic and atraumatic. Eyes:      General: Lids are normal.      Extraocular Movements: Extraocular movements intact. Conjunctiva/sclera: Conjunctivae normal.      Pupils: Pupils are equal.   Cardiovascular:      Rate and Rhythm: Normal rate and regular rhythm. Heart sounds: Normal heart sounds. No murmur heard. No friction rub. No gallop. Pulmonary:      Effort: Pulmonary effort is normal. No respiratory distress. Breath sounds: Normal breath sounds. No wheezing or rales. Musculoskeletal:      Right lower leg: No edema. Left lower leg: No edema. Skin:     General: Skin is warm and dry. Comments: Pearly lesion on the tip of the nose   Neurological:      General: No focal deficit present. Mental Status: She is alert. Psychiatric:         Mood and Affect: Mood normal.         Behavior: Behavior normal.         Thought Content: Thought content normal.         Judgment: Judgment normal.        ASSESSMENT and PLAN   Diagnosis Orders   1. Type 2 diabetes mellitus with chronic kidney disease, with long-term current use of insulin, unspecified CKD stage ([facility])  empagliflozin (JARDIANCE) 25 MG tablet    Hemoglobin A1C    Microalbumin / Creatinine Urine Ratio      2. PUD (peptic ulcer disease)  pantoprazole (PROTONIX) 40 MG tablet      3.  Primary hypertension  olmesartan (BENICAR) 40 MG tablet    metoprolol succinate (TOPROL XL) 50 MG extended release tablet    amLODIPine (NORVASC) 5 MG tablet    Comprehensive Metabolic Panel      4. Mixed hyperlipidemia  rosuvastatin (CRESTOR) 20 MG tablet      5. Major depressive disorder, recurrent episode, moderate (HCC)  vilazodone hcl 40 MG TABS      6. Primary insomnia  traZODone (DESYREL) 100 MG tablet      7. Nausea  promethazine (PHENERGAN) 25 MG tablet      8. Internal nasal lesion  mupirocin (BACTROBAN) 2 % ointment      9. Malignant neoplasm of colon, unspecified part of colon [facility]        10. Chronic obstructive pulmonary disease, unspecified COPD type ([facility]        11. Chronic narcotic dependence ([facility]        12. Alopecia  Minoxidil 5 % FOAM          Reviewed medications and side effects in detail    follow up dermatology  Lab appt 2/27 or later and ov 1 week later    Her other chronic conditions are stable at this time. She is stable on the current treatment and tolerating it well. No significant sides effects. Will not adjust therapy at this time, unless noted above. We will continue to monitor for any problems. Medications refilled and lab work has been ordered where needed. Reviewed medications are explained including any potential interactions or side effects in detail. The patient's questions were answered. She  understands the above and has no further questions. Further workup and treatment should be done if symptoms persist, worsen or new symptoms occur. She  will call to notify us of any problems, complications or worsening symptoms. There are no Patient Instructions on file for this visit. (Some details in this note may have been created with speech-recognition software. Some errors in speech recognition may have occurred.    Most of those have been corrected but some may have been missed.)         Irvin Diaz MD  [address] [facility]  Phone [phone]

## 2023-01-09 DIAGNOSIS — Z79.4 TYPE 2 DIABETES MELLITUS WITH CHRONIC KIDNEY DISEASE, WITH LONG-TERM CURRENT USE OF INSULIN, UNSPECIFIED CKD STAGE (HCC): ICD-10-CM

## 2023-01-09 DIAGNOSIS — E11.22 TYPE 2 DIABETES MELLITUS WITH CHRONIC KIDNEY DISEASE, WITH LONG-TERM CURRENT USE OF INSULIN, UNSPECIFIED CKD STAGE (HCC): ICD-10-CM

## 2023-01-09 RX ORDER — INSULIN LISPRO 100 [IU]/ML
INJECTION, SOLUTION INTRAVENOUS; SUBCUTANEOUS
Qty: 15 ML | Refills: 11 | Status: SHIPPED | OUTPATIENT
Start: 2023-01-09

## 2023-01-16 ENCOUNTER — TELEPHONE (OUTPATIENT)
Dept: FAMILY MEDICINE CLINIC | Facility: CLINIC | Age: 68
End: 2023-01-16

## 2023-01-16 DIAGNOSIS — F17.200 TOBACCO USE DISORDER: Primary | ICD-10-CM

## 2023-01-16 RX ORDER — VARENICLINE TARTRATE 0.5 MG/1
TABLET, FILM COATED ORAL
Qty: 57 TABLET | Refills: 0 | Status: SHIPPED | OUTPATIENT
Start: 2023-01-16

## 2023-01-16 NOTE — TELEPHONE ENCOUNTER
Prescription(s) refilled  It (they) has been escribed to the pharmacy. Requested Prescriptions     Signed Prescriptions Disp Refills    varenicline (CHANTIX) 0.5 MG tablet 57 tablet 0     Si.5mg DAILY for 3 days followed by 0.5mg TWICE DAILY for 4 days followed by 1mg TWICE DAILY     Authorizing Provider: TAB CALL     No orders of the defined types were placed in this encounter. ICD-10-CM    1.  Tobacco use disorder  F17.200 varenicline (CHANTIX) 0.5 MG tablet

## 2023-01-16 NOTE — TELEPHONE ENCOUNTER
Spoke to the patient states that many years ago she tried Chantix but her family said she was irritable on this, pt states she was able to stop smoking on this medication.  Pt states patches do not work for her

## 2023-01-17 ASSESSMENT — ENCOUNTER SYMPTOMS
NAUSEA: 0
ABDOMINAL PAIN: 0
CONSTIPATION: 0
WHEEZING: 0

## 2023-02-07 DIAGNOSIS — F17.200 TOBACCO USE DISORDER: ICD-10-CM

## 2023-02-07 RX ORDER — VARENICLINE TARTRATE 0.5 MG/1
TABLET, FILM COATED ORAL
Qty: 56 TABLET | Refills: 0 | Status: SHIPPED | OUTPATIENT
Start: 2023-02-07 | End: 2023-02-08

## 2023-02-08 DIAGNOSIS — F17.200 TOBACCO USE DISORDER: ICD-10-CM

## 2023-02-08 RX ORDER — VARENICLINE TARTRATE 0.5 MG/1
TABLET, FILM COATED ORAL
Qty: 120 TABLET | Refills: 5 | Status: SHIPPED | OUTPATIENT
Start: 2023-02-08

## 2023-02-08 NOTE — TELEPHONE ENCOUNTER
Prescription(s) refilled  It (they) has been escribed to the pharmacy. Requested Prescriptions     Signed Prescriptions Disp Refills    varenicline (CHANTIX) 0.5 MG tablet 120 tablet 5     Sig: TAKE TWO TABLETS BY MOUTH TWICE A DAY after finishing the lower dosage     Authorizing Provider: TAB CALL     No orders of the defined types were placed in this encounter. ICD-10-CM    1.  Tobacco use disorder  F17.200 varenicline (CHANTIX) 0.5 MG tablet

## 2023-03-06 ENCOUNTER — NURSE ONLY (OUTPATIENT)
Dept: FAMILY MEDICINE CLINIC | Facility: CLINIC | Age: 68
End: 2023-03-06

## 2023-03-06 DIAGNOSIS — Z79.4 TYPE 2 DIABETES MELLITUS WITH CHRONIC KIDNEY DISEASE, WITH LONG-TERM CURRENT USE OF INSULIN, UNSPECIFIED CKD STAGE (HCC): ICD-10-CM

## 2023-03-06 DIAGNOSIS — E11.22 TYPE 2 DIABETES MELLITUS WITH CHRONIC KIDNEY DISEASE, WITH LONG-TERM CURRENT USE OF INSULIN, UNSPECIFIED CKD STAGE (HCC): ICD-10-CM

## 2023-03-06 LAB
CREAT UR-MCNC: 28 MG/DL
EST. AVERAGE GLUCOSE BLD GHB EST-MCNC: 157 MG/DL
HBA1C MFR BLD: 7.1 % (ref 4.8–5.6)
MICROALBUMIN UR-MCNC: 42.4 MG/DL (ref 0–3)
MICROALBUMIN/CREAT UR-RTO: 1514 MG/G (ref 0–30)

## 2023-03-08 ASSESSMENT — ENCOUNTER SYMPTOMS
VISUAL CHANGE: 0
BLURRED VISION: 0

## 2023-03-09 ENCOUNTER — OFFICE VISIT (OUTPATIENT)
Dept: FAMILY MEDICINE CLINIC | Facility: CLINIC | Age: 68
End: 2023-03-09
Payer: MEDICARE

## 2023-03-09 VITALS
OXYGEN SATURATION: 97 % | BODY MASS INDEX: 27.48 KG/M2 | HEART RATE: 72 BPM | SYSTOLIC BLOOD PRESSURE: 172 MMHG | TEMPERATURE: 97.9 F | DIASTOLIC BLOOD PRESSURE: 70 MMHG | RESPIRATION RATE: 16 BRPM | WEIGHT: 140 LBS | HEIGHT: 60 IN

## 2023-03-09 DIAGNOSIS — Z12.11 SCREEN FOR COLON CANCER: ICD-10-CM

## 2023-03-09 DIAGNOSIS — I10 PRIMARY HYPERTENSION: Primary | ICD-10-CM

## 2023-03-09 DIAGNOSIS — J44.9 CHRONIC OBSTRUCTIVE PULMONARY DISEASE, UNSPECIFIED COPD TYPE (HCC): ICD-10-CM

## 2023-03-09 DIAGNOSIS — J30.89 CHRONIC NONSEASONAL ALLERGIC RHINITIS DUE TO POLLEN: ICD-10-CM

## 2023-03-09 DIAGNOSIS — I77.9 PERIPHERAL ARTERY OCCLUSION (HCC): ICD-10-CM

## 2023-03-09 DIAGNOSIS — Z12.31 ENCOUNTER FOR SCREENING MAMMOGRAM FOR MALIGNANT NEOPLASM OF BREAST: ICD-10-CM

## 2023-03-09 DIAGNOSIS — E11.21 TYPE 2 DIABETES WITH NEPHROPATHY (HCC): ICD-10-CM

## 2023-03-09 DIAGNOSIS — F33.1 MODERATE EPISODE OF RECURRENT MAJOR DEPRESSIVE DISORDER (HCC): ICD-10-CM

## 2023-03-09 PROCEDURE — 3078F DIAST BP <80 MM HG: CPT | Performed by: FAMILY MEDICINE

## 2023-03-09 PROCEDURE — 3017F COLORECTAL CA SCREEN DOC REV: CPT | Performed by: FAMILY MEDICINE

## 2023-03-09 PROCEDURE — 3051F HG A1C>EQUAL 7.0%<8.0%: CPT | Performed by: FAMILY MEDICINE

## 2023-03-09 PROCEDURE — 2022F DILAT RTA XM EVC RTNOPTHY: CPT | Performed by: FAMILY MEDICINE

## 2023-03-09 PROCEDURE — 1090F PRES/ABSN URINE INCON ASSESS: CPT | Performed by: FAMILY MEDICINE

## 2023-03-09 PROCEDURE — 3074F SYST BP LT 130 MM HG: CPT | Performed by: FAMILY MEDICINE

## 2023-03-09 PROCEDURE — G8400 PT W/DXA NO RESULTS DOC: HCPCS | Performed by: FAMILY MEDICINE

## 2023-03-09 PROCEDURE — 1123F ACP DISCUSS/DSCN MKR DOCD: CPT | Performed by: FAMILY MEDICINE

## 2023-03-09 PROCEDURE — G8484 FLU IMMUNIZE NO ADMIN: HCPCS | Performed by: FAMILY MEDICINE

## 2023-03-09 PROCEDURE — 1036F TOBACCO NON-USER: CPT | Performed by: FAMILY MEDICINE

## 2023-03-09 PROCEDURE — G8427 DOCREV CUR MEDS BY ELIG CLIN: HCPCS | Performed by: FAMILY MEDICINE

## 2023-03-09 PROCEDURE — 3023F SPIROM DOC REV: CPT | Performed by: FAMILY MEDICINE

## 2023-03-09 PROCEDURE — 99214 OFFICE O/P EST MOD 30 MIN: CPT | Performed by: FAMILY MEDICINE

## 2023-03-09 PROCEDURE — G8417 CALC BMI ABV UP PARAM F/U: HCPCS | Performed by: FAMILY MEDICINE

## 2023-03-09 RX ORDER — POTASSIUM CHLORIDE 1500 MG/1
TABLET, FILM COATED, EXTENDED RELEASE ORAL
COMMUNITY
Start: 2023-02-10

## 2023-03-09 RX ORDER — LEVOCETIRIZINE DIHYDROCHLORIDE 5 MG/1
TABLET, FILM COATED ORAL
Qty: 90 TABLET | Refills: 3 | Status: SHIPPED | OUTPATIENT
Start: 2023-03-09

## 2023-03-09 RX ORDER — MONTELUKAST SODIUM 10 MG/1
10 TABLET ORAL DAILY
Qty: 90 TABLET | Refills: 3 | Status: SHIPPED | OUTPATIENT
Start: 2023-03-09

## 2023-03-09 RX ORDER — PREDNISONE 20 MG/1
TABLET ORAL
COMMUNITY
Start: 2023-02-19

## 2023-03-09 RX ORDER — NICOTINE 21 MG/24HR
1 PATCH, TRANSDERMAL 24 HOURS TRANSDERMAL DAILY
COMMUNITY
Start: 2023-02-20 | End: 2023-03-22

## 2023-03-09 RX ORDER — VILAZODONE HYDROCHLORIDE 40 MG/1
40 TABLET ORAL DAILY
Qty: 90 TABLET | Refills: 3 | Status: SHIPPED | OUTPATIENT
Start: 2023-03-09

## 2023-03-09 RX ORDER — ALBUTEROL SULFATE 90 UG/1
AEROSOL, METERED RESPIRATORY (INHALATION)
Qty: 8.5 G | Refills: 3 | Status: SHIPPED | OUTPATIENT
Start: 2023-03-09

## 2023-03-09 RX ORDER — SPIRONOLACTONE 25 MG/1
TABLET ORAL
Qty: 90 TABLET | Refills: 1 | Status: SHIPPED | OUTPATIENT
Start: 2023-03-09

## 2023-03-09 SDOH — ECONOMIC STABILITY: INCOME INSECURITY: HOW HARD IS IT FOR YOU TO PAY FOR THE VERY BASICS LIKE FOOD, HOUSING, MEDICAL CARE, AND HEATING?: NOT HARD AT ALL

## 2023-03-09 SDOH — ECONOMIC STABILITY: FOOD INSECURITY: WITHIN THE PAST 12 MONTHS, THE FOOD YOU BOUGHT JUST DIDN'T LAST AND YOU DIDN'T HAVE MONEY TO GET MORE.: NEVER TRUE

## 2023-03-09 SDOH — ECONOMIC STABILITY: FOOD INSECURITY: WITHIN THE PAST 12 MONTHS, YOU WORRIED THAT YOUR FOOD WOULD RUN OUT BEFORE YOU GOT MONEY TO BUY MORE.: NEVER TRUE

## 2023-03-09 SDOH — ECONOMIC STABILITY: HOUSING INSECURITY
IN THE LAST 12 MONTHS, WAS THERE A TIME WHEN YOU DID NOT HAVE A STEADY PLACE TO SLEEP OR SLEPT IN A SHELTER (INCLUDING NOW)?: NO

## 2023-03-09 ASSESSMENT — PATIENT HEALTH QUESTIONNAIRE - PHQ9
5. POOR APPETITE OR OVEREATING: 1
SUM OF ALL RESPONSES TO PHQ QUESTIONS 1-9: 9
10. IF YOU CHECKED OFF ANY PROBLEMS, HOW DIFFICULT HAVE THESE PROBLEMS MADE IT FOR YOU TO DO YOUR WORK, TAKE CARE OF THINGS AT HOME, OR GET ALONG WITH OTHER PEOPLE: 1
2. FEELING DOWN, DEPRESSED OR HOPELESS: 0
8. MOVING OR SPEAKING SO SLOWLY THAT OTHER PEOPLE COULD HAVE NOTICED. OR THE OPPOSITE, BEING SO FIGETY OR RESTLESS THAT YOU HAVE BEEN MOVING AROUND A LOT MORE THAN USUAL: 0
7. TROUBLE CONCENTRATING ON THINGS, SUCH AS READING THE NEWSPAPER OR WATCHING TELEVISION: 1
4. FEELING TIRED OR HAVING LITTLE ENERGY: 3
SUM OF ALL RESPONSES TO PHQ9 QUESTIONS 1 & 2: 3
9. THOUGHTS THAT YOU WOULD BE BETTER OFF DEAD, OR OF HURTING YOURSELF: 0
SUM OF ALL RESPONSES TO PHQ QUESTIONS 1-9: 9
1. LITTLE INTEREST OR PLEASURE IN DOING THINGS: 3
SUM OF ALL RESPONSES TO PHQ QUESTIONS 1-9: 9
6. FEELING BAD ABOUT YOURSELF - OR THAT YOU ARE A FAILURE OR HAVE LET YOURSELF OR YOUR FAMILY DOWN: 0
3. TROUBLE FALLING OR STAYING ASLEEP: 1
SUM OF ALL RESPONSES TO PHQ QUESTIONS 1-9: 9

## 2023-03-09 ASSESSMENT — ENCOUNTER SYMPTOMS
RHINORRHEA: 0
COUGH: 0
WHEEZING: 0
NAUSEA: 0
ABDOMINAL PAIN: 0
CONSTIPATION: 0
VOMITING: 0
DIARRHEA: 0
SHORTNESS OF BREATH: 0

## 2023-03-09 NOTE — PROGRESS NOTES
HISTORY OF PRESENT ILLNESS  Chief Complaint   Patient presents with    Eye Problem     Blood shot eye; right noticed on         Blood shot eye; right noticed on . Neck still bothering her. Pain referred her to another surgeon. Is going to try a diabetic diet that she found    Quit smoking with the chantix. Playing poVIEOmon and feels like it is helping her some. Not able to do what she enjoys anymore due to her back. Is seeing -180 at home. Had a back injection in . Having to use the movantik. Diabetes Mellitus: Patient presents for follow up of diabetes. Symptoms: taking medications as instructed, no medication side effects noted, no TIA's, no chest pain on exertion, no dyspnea on exertion, no swelling of ankles. Symptoms have asymptomatic. Patient denies chest pain and palpitations. She states she is compliant most of the time with her  medications. She  states she is compliant most of the time with her  diet. Evaluation to date has been included below. Home sugars: symptomatic hypoglycemia does not occur. Treatment to date: medications. Diabetic issues reviewed with her: low cholesterol diet, weight control and daily exercise discussed.   Key Antihyperglycemic Medications            insulin lispro, 1 Unit Dial, (HUMALOG/ADMELOG) 100 UNIT/ML SOPN (Taking)    Sig: USE 2 TO 16 UNITS 4 TIMES DAILY (AT MEALTIME AND BEDTIME) AS NEEDED FOR ELEVATED BLOOD SUGAR    empagliflozin (JARDIANCE) 25 MG tablet (Taking)    Si po qam for blood sugar instead of the 10 mg dose    TRESIBA FLEXTOUCH 200 UNIT/ML SOPN (Taking)    Sig: INJECT 50 UNITS UNDER THE SKIN DAILY           Garcia CAD CHF Meds            spironolactone (ALDACTONE) 25 MG tablet (Taking)    Si po qam for blood pressure    olmesartan (BENICAR) 40 MG tablet (Taking)    Si p.o. daily for blood pressure instead of olmesartan HCTZ    rosuvastatin (CRESTOR) 20 MG tablet (Taking)    Sig: TAKE ONE TABLET BY MOUTH NIGHTLY    metoprolol succinate (TOPROL XL) 50 MG extended release tablet (Taking)    Sig: TAKE ONE TABLET BY MOUTH EVERY night    amLODIPine (NORVASC) 5 MG tablet (Taking)    Sig: Take 1 Tablet by mouth daily. ( instead of tribenzor with benicar hct) - Oral           Lab Results   Component Value Date    LABA1C 7.1 (H) 03/06/2023     Lab Results   Component Value Date     03/06/2023      Lab Results   Component Value Date    CREATININE 0.80 08/08/2022      Lab Results   Component Value Date    LABMICR 42.40 (H) 03/06/2023      Wt Readings from Last 3 Encounters:   03/09/23 140 lb (63.5 kg)   01/06/23 139 lb (63 kg)   12/21/22 139 lb (63 kg)      BP Readings from Last 3 Encounters:   03/09/23 (!) 172/70   01/06/23 (!) 168/70   11/29/22 (!) 176/80        Diabetes  There are no hypoglycemic associated symptoms. Pertinent negatives for hypoglycemia include no dizziness, headaches, nervousness/anxiousness or tremors. Pertinent negatives for diabetes include no blurred vision, no chest pain, no fatigue, no foot paresthesias, no foot ulcerations, no polydipsia, no polyphagia, no polyuria, no visual change, no weakness and no weight loss. There are no hypoglycemic complications. Symptoms are stable. Pertinent negatives for diabetic complications include no autonomic neuropathy. Risk factors for coronary artery disease include diabetes mellitus, dyslipidemia, hypertension and post-menopausal.   Neck Pain   This is a chronic problem. The current episode started more than 1 month ago. The problem occurs constantly. The problem has been unchanged. The pain is associated with nothing. The pain is present in the left side. The symptoms are aggravated by position. Pertinent negatives include no chest pain, fever, headaches, numbness, visual change, weakness or weight loss. Review of Systems   Constitutional:  Negative for activity change, appetite change, chills, fatigue, fever and weight loss.    HENT: Negative for congestion and rhinorrhea.    Eyes:  Negative for blurred vision.   Respiratory:  Negative for cough, shortness of breath and wheezing.    Cardiovascular:  Negative for chest pain.   Gastrointestinal:  Negative for abdominal pain, constipation, diarrhea, nausea and vomiting.   Endocrine: Negative for polydipsia, polyphagia and polyuria.   Genitourinary:  Negative for dysuria.   Musculoskeletal:  Positive for neck pain. Negative for arthralgias and myalgias.   Neurological:  Negative for dizziness, tremors, weakness, numbness and headaches.   Psychiatric/Behavioral:  Negative for dysphoric mood. The patient is not nervous/anxious.       Patient Active Problem List   Diagnosis    Back pain    Renal insufficiency    PUD (peptic ulcer disease)    Mixed hyperlipidemia    Neutrophilia    DM (diabetes mellitus) ([facility]    Tremor    Melanocytic nevus of trunk    Falls frequently    Bacteremia    Hypokalemia    Coronary artery calcification    Major depressive disorder, recurrent episode, moderate (HCC)    Chronic pain    Hypertension    Osteoarthritis of both knees    Murmur, cardiac    At risk for allergic reaction to medication    Isolated proteinuria without specific morphologic lesion    Arthritis of left knee    History of colon cancer    GERD (gastroesophageal reflux disease)    MDD (major depressive disorder)    Scoliosis, congenital    Tobacco abuse    Osteoarthritis    CAD (coronary artery disease)    Insomnia    Acute encephalopathy    Umbilical hernia without obstruction and without gangrene    Type 2 diabetes with nephropathy (HCC)    Cancer (HCC)    Chronic narcotic dependence (HCC)    Constipation    Preventative health care    Edema of both legs    Thyroid nodule    RBBB    Post-traumatic osteoarthritis of right knee    Attention deficit disorder (ADD) without hyperactivity    Type 2 diabetes mellitus with chronic kidney disease, with long-term current use of insulin (HCC)    Type 2 diabetes  mellitus without complication, with long-term current use of insulin (HCC)    Essential hypertension with goal blood pressure less than 140/90    Chronic obstructive pulmonary disease (HCC)    Microalbuminuria    Memory loss    Neuropathy    Peripheral artery occlusion (HCC)    Toe cyanosis    Paresthesia of both legs    Neural foraminal stenosis of lumbar spine    DDD (degenerative disc disease), lumbar    Pain of fifth toe    Scalp lesion    Right foot pain    Wheeze    Urethral stenosis    Mood disorder (HCC)    Chronic nonseasonal allergic rhinitis due to pollen    Trapezius muscle spasm    Malignant neoplasm of colon, unspecified part of colon ([facility])         Blood pressure (!) 172/70, pulse 72, temperature 97.9 °F (36.6 °C), temperature source Temporal, resp. rate 16, height 5' (1.524 m), weight 140 lb (63.5 kg), SpO2 97 %. Pain Scale: 6/10 Pain Location: Hip  Body mass index is 27.34 kg/m².     Physical Exam  Vitals and nursing note reviewed.   Constitutional:       General: She is not in acute distress.     Appearance: Normal appearance. She is normal weight. She is not toxic-appearing.   HENT:      Head: Normocephalic and atraumatic.   Eyes:      General: Lids are normal.      Extraocular Movements: Extraocular movements intact.      Conjunctiva/sclera: Conjunctivae normal.      Pupils: Pupils are equal.   Cardiovascular:      Rate and Rhythm: Normal rate and regular rhythm.      Heart sounds: Normal heart sounds. No murmur heard.    No friction rub. No gallop.   Pulmonary:      Effort: Pulmonary effort is normal. No respiratory distress.      Breath sounds: Normal breath sounds. No wheezing or rales.   Musculoskeletal:      Right lower leg: No edema.      Left lower leg: No edema.   Skin:     General: Skin is warm and dry.   Neurological:      General: No focal deficit present.      Mental Status: She is alert.   Psychiatric:         Mood and Affect: Mood normal.         Behavior: Behavior normal.          Thought Content: Thought content normal.         Judgment: Judgment normal.            ASSESSMENT and PLAN   Diagnosis Orders   1. Primary hypertension  spironolactone (ALDACTONE) 25 MG tablet    Basic Metabolic Panel      2. Chronic nonseasonal allergic rhinitis due to pollen  levocetirizine (XYZAL) 5 MG tablet    montelukast (SINGULAIR) 10 MG tablet      3. Moderate episode of recurrent major depressive disorder (HCC)  vilazodone hcl 40 MG TABS      4. Encounter for screening mammogram for malignant neoplasm of breast        5. Screen for colon cancer        6. Type 2 diabetes with nephropathy (HCC)  Hemoglobin A1C    Microalbumin / Creatinine Urine Ratio      7. Peripheral artery occlusion (HCC)        8. Chronic obstructive pulmonary disease, unspecified COPD type (HCC)  albuterol sulfate HFA (PROVENTIL;VENTOLIN;PROAIR) 108 (90 Base) MCG/ACT inhaler          Reviewed medications and side effects in detail    Labs in  and ov 2-7 d later. Her other chronic conditions are stable at this time. She continues to be followed by her previous specialists for the above chronic issues. She is stable on the current treatment and tolerating it well. No significant sides effects. Will not adjust therapy at this time, unless noted above. We will continue to monitor for any problems. Medications refilled and lab work has been ordered where needed. Reviewed medications are explained including any potential interactions or side effects in detail. The patient's questions were answered. She  understands the above and has no further questions. Further workup and treatment should be done if symptoms persist, worsen or new symptoms occur. She  will call to notify us of any problems, complications or worsening symptoms. Follow-up and Dispositions    Return in about 3 months (around 6/9/2023). There are no Patient Instructions on file for this visit.     (Some details in this note may have been created with speech-recognition software. Some errors in speech recognition may have occurred.    Most of those have been corrected but some may have been missed.)         Mónica Lopez MD  [address] [facility]  Phone [phone]

## 2023-03-17 RX ORDER — BLOOD SUGAR DIAGNOSTIC
STRIP MISCELLANEOUS
Qty: 300 STRIP | Refills: 11 | Status: SHIPPED | OUTPATIENT
Start: 2023-03-17

## 2023-03-20 RX ORDER — BLOOD SUGAR DIAGNOSTIC
STRIP MISCELLANEOUS
Qty: 300 STRIP | Refills: 11 | Status: SHIPPED | OUTPATIENT
Start: 2023-03-20

## 2023-04-14 ENCOUNTER — PATIENT MESSAGE (OUTPATIENT)
Dept: FAMILY MEDICINE CLINIC | Facility: CLINIC | Age: 68
End: 2023-04-14

## 2023-04-14 DIAGNOSIS — M62.838 TRAPEZIUS MUSCLE SPASM: ICD-10-CM

## 2023-04-18 NOTE — TELEPHONE ENCOUNTER
From: TETE WILSON  To:  Sean Fillers  Sent: 4/14/2023 8:37 AM EDT  Subject: Jessica Christianson Dr. Ken Jacob has printed the prescription for your dexcom, I am placing this up front to be picked up at your convenience

## 2023-04-19 RX ORDER — CYCLOBENZAPRINE HCL 10 MG
TABLET ORAL
Qty: 90 TABLET | Refills: 5 | Status: SHIPPED | OUTPATIENT
Start: 2023-04-19

## 2023-04-25 ENCOUNTER — TELEPHONE (OUTPATIENT)
Dept: PHARMACY | Facility: CLINIC | Age: 68
End: 2023-04-25

## 2023-04-25 NOTE — TELEPHONE ENCOUNTER
Per Elie, Rosuvastatin 20mg will be refill for a 90ds and have it ready for p/u today with 0.00 copay and billed through [address] [place] [address]    Program: [address] in place:  No  Recommendation Provided To: Pharmacy: 1  Intervention Detail: Adherence Monitorin  Intervention Accepted By: Pharmacy: 1  Gap Closed?: Yes   Time Spent (min): 20

## 2023-04-25 NOTE — TELEPHONE ENCOUNTER
[facility] CLINICAL PHARMACY: ADHERENCE REVIEW  Identified care gap per [employer] fills at [address] : ACE/ARB and Statin adherence    Patient also appears to be prescribed: Diabetes regimen including insulin    ASSESSMENT    ACE/ARB ADHERENCE    Insurance Records claims through 4/10/23 (Prior Year PDC = 100% - PASSED; YTD [place] = FIRST FILL; Potential Fail Date: 23): Olmesartan 40 mg tab last filled on 3/2/23 for 90 day supply. Next refill due: 23 max refill due date 23  Adjusted refill due date ~23    Prescribed si tablet/capsule daily    Per Reconcile Dispense History:  OLMESARTAN MEDOXOMIL  40 MG TABS 2023 90 90 tablet Anabel Lainez MD [address] .. OLMESARTAN MEDOXOMIL  40 MG TABS 2022 90 90 tablet Anabel Lainez MD [address] .. OLMESARTAN MEDOXOMIL  40 MG TABS 2022 90 90 tablet Anabel Lainez MD [address] .. OLMESARTAN MEDOXOMIL  40 MG TABS 2022 90 90 tablet [address] .. OLMESARTAN MEDOXOMIL  40 MG TABS 2022 90 90 tablet [address] .. OLMESARTAN MEDOXOMIL  40 MG TABS 2022 90 90 tablet [address] .. Last Rx 1/6/23 x 90 1 refill - believe has refill remaining at pharmacy when needed    BP Readings from Last 3 Encounters:   23 (!) 172/70   23 (!) 168/70   22 (!) 176/80     CrCl cannot be calculated (Patient's most recent lab result is older than the maximum 180 days allowed. ).   Lab Results   Component Value Date    CREATININE 0.80 2022     Lab Results   Component Value Date    K 3.0 (L) 2022     DIABETES ADHERENCE  - Kiran Hernandez n/a since is on insulin    Lab Results   Component Value Date    LABA1C 7.1 (H) 2023    LABA1C 8.6 (H) 2022    LABA1C 10.4 (H) 2022     NOTE: A1c <9%  100  [id number] Records claims through 4/10/23 (Prior Year PDC = 100% - PASSED; YTD [place]

## 2023-05-04 ENCOUNTER — OFFICE VISIT (OUTPATIENT)
Dept: FAMILY MEDICINE CLINIC | Facility: CLINIC | Age: 68
End: 2023-05-04
Payer: MEDICARE

## 2023-05-04 VITALS
WEIGHT: 136.6 LBS | HEART RATE: 99 BPM | DIASTOLIC BLOOD PRESSURE: 60 MMHG | TEMPERATURE: 98.1 F | RESPIRATION RATE: 17 BRPM | HEIGHT: 60 IN | BODY MASS INDEX: 26.82 KG/M2 | OXYGEN SATURATION: 95 % | SYSTOLIC BLOOD PRESSURE: 110 MMHG

## 2023-05-04 DIAGNOSIS — E78.2 MIXED HYPERLIPIDEMIA: ICD-10-CM

## 2023-05-04 DIAGNOSIS — K27.9 PUD (PEPTIC ULCER DISEASE): ICD-10-CM

## 2023-05-04 DIAGNOSIS — E11.22 TYPE 2 DIABETES MELLITUS WITH CHRONIC KIDNEY DISEASE, WITH LONG-TERM CURRENT USE OF INSULIN, UNSPECIFIED CKD STAGE (HCC): ICD-10-CM

## 2023-05-04 DIAGNOSIS — M25.552 LEFT HIP PAIN: Primary | ICD-10-CM

## 2023-05-04 DIAGNOSIS — E11.69 TYPE 2 DIABETES MELLITUS WITH OTHER SPECIFIED COMPLICATION, WITH LONG-TERM CURRENT USE OF INSULIN (HCC): ICD-10-CM

## 2023-05-04 DIAGNOSIS — I10 PRIMARY HYPERTENSION: ICD-10-CM

## 2023-05-04 DIAGNOSIS — Z79.4 TYPE 2 DIABETES MELLITUS WITH OTHER SPECIFIED COMPLICATION, WITH LONG-TERM CURRENT USE OF INSULIN (HCC): ICD-10-CM

## 2023-05-04 DIAGNOSIS — E11.21 TYPE 2 DIABETES WITH NEPHROPATHY (HCC): ICD-10-CM

## 2023-05-04 DIAGNOSIS — Z79.4 TYPE 2 DIABETES MELLITUS WITH CHRONIC KIDNEY DISEASE, WITH LONG-TERM CURRENT USE OF INSULIN, UNSPECIFIED CKD STAGE (HCC): ICD-10-CM

## 2023-05-04 DIAGNOSIS — E11.9 TYPE 2 DIABETES MELLITUS WITHOUT COMPLICATION, WITH LONG-TERM CURRENT USE OF INSULIN (HCC): ICD-10-CM

## 2023-05-04 DIAGNOSIS — Z79.4 TYPE 2 DIABETES MELLITUS WITHOUT COMPLICATION, WITH LONG-TERM CURRENT USE OF INSULIN (HCC): ICD-10-CM

## 2023-05-04 PROCEDURE — G8400 PT W/DXA NO RESULTS DOC: HCPCS | Performed by: FAMILY MEDICINE

## 2023-05-04 PROCEDURE — 3051F HG A1C>EQUAL 7.0%<8.0%: CPT | Performed by: FAMILY MEDICINE

## 2023-05-04 PROCEDURE — G8427 DOCREV CUR MEDS BY ELIG CLIN: HCPCS | Performed by: FAMILY MEDICINE

## 2023-05-04 PROCEDURE — 1123F ACP DISCUSS/DSCN MKR DOCD: CPT | Performed by: FAMILY MEDICINE

## 2023-05-04 PROCEDURE — 99214 OFFICE O/P EST MOD 30 MIN: CPT | Performed by: FAMILY MEDICINE

## 2023-05-04 PROCEDURE — 2022F DILAT RTA XM EVC RTNOPTHY: CPT | Performed by: FAMILY MEDICINE

## 2023-05-04 PROCEDURE — 3074F SYST BP LT 130 MM HG: CPT | Performed by: FAMILY MEDICINE

## 2023-05-04 PROCEDURE — 1036F TOBACCO NON-USER: CPT | Performed by: FAMILY MEDICINE

## 2023-05-04 PROCEDURE — 1090F PRES/ABSN URINE INCON ASSESS: CPT | Performed by: FAMILY MEDICINE

## 2023-05-04 PROCEDURE — G8417 CALC BMI ABV UP PARAM F/U: HCPCS | Performed by: FAMILY MEDICINE

## 2023-05-04 PROCEDURE — 3078F DIAST BP <80 MM HG: CPT | Performed by: FAMILY MEDICINE

## 2023-05-04 PROCEDURE — 3017F COLORECTAL CA SCREEN DOC REV: CPT | Performed by: FAMILY MEDICINE

## 2023-05-04 RX ORDER — PANTOPRAZOLE SODIUM 40 MG/1
TABLET, DELAYED RELEASE ORAL
Qty: 180 TABLET | Refills: 1 | Status: SHIPPED | OUTPATIENT
Start: 2023-05-04

## 2023-05-04 RX ORDER — METOPROLOL SUCCINATE 50 MG/1
TABLET, EXTENDED RELEASE ORAL
Qty: 90 TABLET | Refills: 1 | Status: SHIPPED | OUTPATIENT
Start: 2023-05-04

## 2023-05-04 RX ORDER — CALCIUM CARBONATE 260MG(650)
TABLET,CHEWABLE ORAL
Qty: 30 TABLET | Refills: 5 | Status: SHIPPED | OUTPATIENT
Start: 2023-05-04

## 2023-05-04 RX ORDER — SEMAGLUTIDE 1.34 MG/ML
INJECTION, SOLUTION SUBCUTANEOUS
Qty: 4 ML | Refills: 5 | Status: SHIPPED | OUTPATIENT
Start: 2023-05-04

## 2023-05-04 RX ORDER — OLMESARTAN MEDOXOMIL 40 MG/1
TABLET ORAL
Qty: 90 TABLET | Refills: 1 | Status: SHIPPED | OUTPATIENT
Start: 2023-05-04

## 2023-05-04 RX ORDER — ROSUVASTATIN CALCIUM 20 MG/1
TABLET, COATED ORAL
Qty: 90 TABLET | Refills: 5 | Status: SHIPPED | OUTPATIENT
Start: 2023-05-04

## 2023-05-04 SDOH — ECONOMIC STABILITY: FOOD INSECURITY: WITHIN THE PAST 12 MONTHS, YOU WORRIED THAT YOUR FOOD WOULD RUN OUT BEFORE YOU GOT MONEY TO BUY MORE.: NEVER TRUE

## 2023-05-04 SDOH — ECONOMIC STABILITY: INCOME INSECURITY: HOW HARD IS IT FOR YOU TO PAY FOR THE VERY BASICS LIKE FOOD, HOUSING, MEDICAL CARE, AND HEATING?: NOT HARD AT ALL

## 2023-05-04 SDOH — ECONOMIC STABILITY: FOOD INSECURITY: WITHIN THE PAST 12 MONTHS, THE FOOD YOU BOUGHT JUST DIDN'T LAST AND YOU DIDN'T HAVE MONEY TO GET MORE.: NEVER TRUE

## 2023-05-04 ASSESSMENT — PATIENT HEALTH QUESTIONNAIRE - PHQ9
SUM OF ALL RESPONSES TO PHQ QUESTIONS 1-9: 4
1. LITTLE INTEREST OR PLEASURE IN DOING THINGS: 2
SUM OF ALL RESPONSES TO PHQ QUESTIONS 1-9: 4
10. IF YOU CHECKED OFF ANY PROBLEMS, HOW DIFFICULT HAVE THESE PROBLEMS MADE IT FOR YOU TO DO YOUR WORK, TAKE CARE OF THINGS AT HOME, OR GET ALONG WITH OTHER PEOPLE: 1
8. MOVING OR SPEAKING SO SLOWLY THAT OTHER PEOPLE COULD HAVE NOTICED. OR THE OPPOSITE, BEING SO FIGETY OR RESTLESS THAT YOU HAVE BEEN MOVING AROUND A LOT MORE THAN USUAL: 0
SUM OF ALL RESPONSES TO PHQ9 QUESTIONS 1 & 2: 3
9. THOUGHTS THAT YOU WOULD BE BETTER OFF DEAD, OR OF HURTING YOURSELF: 0
5. POOR APPETITE OR OVEREATING: 0
4. FEELING TIRED OR HAVING LITTLE ENERGY: 0
SUM OF ALL RESPONSES TO PHQ QUESTIONS 1-9: 4
6. FEELING BAD ABOUT YOURSELF - OR THAT YOU ARE A FAILURE OR HAVE LET YOURSELF OR YOUR FAMILY DOWN: 0
SUM OF ALL RESPONSES TO PHQ QUESTIONS 1-9: 4
2. FEELING DOWN, DEPRESSED OR HOPELESS: 1
7. TROUBLE CONCENTRATING ON THINGS, SUCH AS READING THE NEWSPAPER OR WATCHING TELEVISION: 0
3. TROUBLE FALLING OR STAYING ASLEEP: 1

## 2023-05-04 ASSESSMENT — ANXIETY QUESTIONNAIRES
4. TROUBLE RELAXING: 0
5. BEING SO RESTLESS THAT IT IS HARD TO SIT STILL: 0
IF YOU CHECKED OFF ANY PROBLEMS ON THIS QUESTIONNAIRE, HOW DIFFICULT HAVE THESE PROBLEMS MADE IT FOR YOU TO DO YOUR WORK, TAKE CARE OF THINGS AT HOME, OR GET ALONG WITH OTHER PEOPLE: NOT DIFFICULT AT ALL
GAD7 TOTAL SCORE: 0
7. FEELING AFRAID AS IF SOMETHING AWFUL MIGHT HAPPEN: 0
2. NOT BEING ABLE TO STOP OR CONTROL WORRYING: 0
1. FEELING NERVOUS, ANXIOUS, OR ON EDGE: 0
3. WORRYING TOO MUCH ABOUT DIFFERENT THINGS: 0
6. BECOMING EASILY ANNOYED OR IRRITABLE: 0

## 2023-05-05 ENCOUNTER — TELEPHONE (OUTPATIENT)
Dept: FAMILY MEDICINE CLINIC | Facility: CLINIC | Age: 68
End: 2023-05-05

## 2023-05-05 ENCOUNTER — NURSE ONLY (OUTPATIENT)
Dept: FAMILY MEDICINE CLINIC | Facility: CLINIC | Age: 68
End: 2023-05-05

## 2023-05-05 DIAGNOSIS — M25.552 LEFT HIP PAIN: ICD-10-CM

## 2023-05-05 NOTE — PROGRESS NOTES
Pt was in office for assistance with learning how to take Ozempic injection properly. Pt was demonstrated to then pt gave self injection with CMA watching. Pt did very well and voiced understanding. Advised pt to call if any issues or concerns.

## 2023-05-11 ASSESSMENT — ENCOUNTER SYMPTOMS
VISUAL CHANGE: 0
DIARRHEA: 0
COUGH: 0
NAUSEA: 0
SHORTNESS OF BREATH: 0
BLURRED VISION: 0
VOMITING: 0
WHEEZING: 0
ABDOMINAL PAIN: 0
RHINORRHEA: 0
CONSTIPATION: 0

## 2023-05-15 ENCOUNTER — TELEPHONE (OUTPATIENT)
Dept: FAMILY MEDICINE CLINIC | Facility: CLINIC | Age: 68
End: 2023-05-15

## 2023-05-15 DIAGNOSIS — Z78.0 ASYMPTOMATIC MENOPAUSE: Primary | ICD-10-CM

## 2023-05-15 NOTE — TELEPHONE ENCOUNTER
[facility] scheduling called and they need a new order for a Bone Density the one in the system expires 05/19/2023 and she can not get in before then

## 2023-05-18 NOTE — RESULT ENCOUNTER NOTE
The radiologist noted an unusual appearance of the hip bone and some thinning of the bone as well. He suggested follow-up with better quality x-rays or a CT of the hip. Are you going back to pain management? If so you can get them to x-ray your hip. If not let us know we can set up an x-ray at [facility] or set you up for CT of that hip.

## 2023-05-19 NOTE — PROGRESS NOTES
Orders Placed This Encounter   Procedures    CT HIP LEFT WO CONTRAST     Standing Status:   Future     Standing Expiration Date:   5/19/2024

## 2023-06-21 ENCOUNTER — TELEPHONE (OUTPATIENT)
Dept: FAMILY MEDICINE CLINIC | Facility: CLINIC | Age: 68
End: 2023-06-21

## 2023-07-10 RX ORDER — BLOOD SUGAR DIAGNOSTIC
STRIP MISCELLANEOUS
Qty: 300 STRIP | Refills: 11 | Status: SHIPPED | OUTPATIENT
Start: 2023-07-10

## 2023-07-10 RX ORDER — PROMETHAZINE HYDROCHLORIDE 25 MG/1
25 TABLET ORAL EVERY 6 HOURS PRN
Qty: 30 TABLET | Refills: 5 | Status: CANCELLED | OUTPATIENT
Start: 2023-07-10

## 2023-07-10 NOTE — TELEPHONE ENCOUNTER
Patient needs refills on her Test Strips, she is also wanting to discuss possibly receiving another Dexcom Device on her upcoming appointment as she's using way to many test strips

## 2023-07-11 SDOH — HEALTH STABILITY: PHYSICAL HEALTH: ON AVERAGE, HOW MANY DAYS PER WEEK DO YOU ENGAGE IN MODERATE TO STRENUOUS EXERCISE (LIKE A BRISK WALK)?: 2 DAYS

## 2023-07-11 SDOH — HEALTH STABILITY: PHYSICAL HEALTH: ON AVERAGE, HOW MANY MINUTES DO YOU ENGAGE IN EXERCISE AT THIS LEVEL?: 60 MIN

## 2023-07-11 ASSESSMENT — PATIENT HEALTH QUESTIONNAIRE - PHQ9
8. MOVING OR SPEAKING SO SLOWLY THAT OTHER PEOPLE COULD HAVE NOTICED. OR THE OPPOSITE, BEING SO FIGETY OR RESTLESS THAT YOU HAVE BEEN MOVING AROUND A LOT MORE THAN USUAL: 0
SUM OF ALL RESPONSES TO PHQ QUESTIONS 1-9: 11
10. IF YOU CHECKED OFF ANY PROBLEMS, HOW DIFFICULT HAVE THESE PROBLEMS MADE IT FOR YOU TO DO YOUR WORK, TAKE CARE OF THINGS AT HOME, OR GET ALONG WITH OTHER PEOPLE: 1
1. LITTLE INTEREST OR PLEASURE IN DOING THINGS: 1
2. FEELING DOWN, DEPRESSED OR HOPELESS: 0
6. FEELING BAD ABOUT YOURSELF - OR THAT YOU ARE A FAILURE OR HAVE LET YOURSELF OR YOUR FAMILY DOWN: 0
7. TROUBLE CONCENTRATING ON THINGS, SUCH AS READING THE NEWSPAPER OR WATCHING TELEVISION: 3
SUM OF ALL RESPONSES TO PHQ QUESTIONS 1-9: 11
5. POOR APPETITE OR OVEREATING: 1
SUM OF ALL RESPONSES TO PHQ9 QUESTIONS 1 & 2: 1
SUM OF ALL RESPONSES TO PHQ QUESTIONS 1-9: 11
9. THOUGHTS THAT YOU WOULD BE BETTER OFF DEAD, OR OF HURTING YOURSELF: 0
SUM OF ALL RESPONSES TO PHQ QUESTIONS 1-9: 11
3. TROUBLE FALLING OR STAYING ASLEEP: 3
4. FEELING TIRED OR HAVING LITTLE ENERGY: 3

## 2023-07-11 ASSESSMENT — COLUMBIA-SUICIDE SEVERITY RATING SCALE - C-SSRS
6. HAVE YOU EVER DONE ANYTHING, STARTED TO DO ANYTHING, OR PREPARED TO DO ANYTHING TO END YOUR LIFE?: NO
1. WITHIN THE PAST MONTH, HAVE YOU WISHED YOU WERE DEAD OR WISHED YOU COULD GO TO SLEEP AND NOT WAKE UP?: NO
2. HAVE YOU ACTUALLY HAD ANY THOUGHTS OF KILLING YOURSELF?: NO

## 2023-07-11 ASSESSMENT — LIFESTYLE VARIABLES
HOW MANY STANDARD DRINKS CONTAINING ALCOHOL DO YOU HAVE ON A TYPICAL DAY: PATIENT DOES NOT DRINK
HOW OFTEN DO YOU HAVE SIX OR MORE DRINKS ON ONE OCCASION: 1
HOW OFTEN DO YOU HAVE A DRINK CONTAINING ALCOHOL: NEVER
HOW MANY STANDARD DRINKS CONTAINING ALCOHOL DO YOU HAVE ON A TYPICAL DAY: 0
HOW OFTEN DO YOU HAVE A DRINK CONTAINING ALCOHOL: 1

## 2023-07-14 ENCOUNTER — OFFICE VISIT (OUTPATIENT)
Dept: FAMILY MEDICINE CLINIC | Facility: CLINIC | Age: 68
End: 2023-07-14
Payer: MEDICARE

## 2023-07-14 VITALS
RESPIRATION RATE: 16 BRPM | OXYGEN SATURATION: 95 % | TEMPERATURE: 97.3 F | HEART RATE: 77 BPM | DIASTOLIC BLOOD PRESSURE: 57 MMHG | SYSTOLIC BLOOD PRESSURE: 133 MMHG | BODY MASS INDEX: 26.11 KG/M2 | WEIGHT: 133 LBS | HEIGHT: 60 IN

## 2023-07-14 DIAGNOSIS — R11.0 NAUSEA: ICD-10-CM

## 2023-07-14 DIAGNOSIS — J44.9 CHRONIC OBSTRUCTIVE PULMONARY DISEASE, UNSPECIFIED COPD TYPE (HCC): ICD-10-CM

## 2023-07-14 DIAGNOSIS — Z79.4 TYPE 2 DIABETES MELLITUS WITH CHRONIC KIDNEY DISEASE, WITH LONG-TERM CURRENT USE OF INSULIN, UNSPECIFIED CKD STAGE (HCC): ICD-10-CM

## 2023-07-14 DIAGNOSIS — E11.21 TYPE 2 DIABETES WITH NEPHROPATHY (HCC): ICD-10-CM

## 2023-07-14 DIAGNOSIS — E11.22 TYPE 2 DIABETES MELLITUS WITH CHRONIC KIDNEY DISEASE, WITH LONG-TERM CURRENT USE OF INSULIN, UNSPECIFIED CKD STAGE (HCC): ICD-10-CM

## 2023-07-14 DIAGNOSIS — K27.9 PUD (PEPTIC ULCER DISEASE): ICD-10-CM

## 2023-07-14 DIAGNOSIS — I77.9 PERIPHERAL ARTERY OCCLUSION (HCC): ICD-10-CM

## 2023-07-14 DIAGNOSIS — G89.29 CHRONIC BILATERAL LOW BACK PAIN, UNSPECIFIED WHETHER SCIATICA PRESENT: ICD-10-CM

## 2023-07-14 DIAGNOSIS — M54.50 CHRONIC BILATERAL LOW BACK PAIN, UNSPECIFIED WHETHER SCIATICA PRESENT: ICD-10-CM

## 2023-07-14 DIAGNOSIS — Z87.891 PERSONAL HISTORY OF TOBACCO USE: ICD-10-CM

## 2023-07-14 DIAGNOSIS — E78.2 MIXED HYPERLIPIDEMIA: ICD-10-CM

## 2023-07-14 DIAGNOSIS — F17.210 CIGARETTE NICOTINE DEPENDENCE, UNCOMPLICATED: ICD-10-CM

## 2023-07-14 DIAGNOSIS — F11.20 CHRONIC NARCOTIC DEPENDENCE (HCC): ICD-10-CM

## 2023-07-14 DIAGNOSIS — Z00.00 MEDICARE ANNUAL WELLNESS VISIT, SUBSEQUENT: Primary | ICD-10-CM

## 2023-07-14 PROCEDURE — 3074F SYST BP LT 130 MM HG: CPT | Performed by: FAMILY MEDICINE

## 2023-07-14 PROCEDURE — G0439 PPPS, SUBSEQ VISIT: HCPCS | Performed by: FAMILY MEDICINE

## 2023-07-14 PROCEDURE — 3078F DIAST BP <80 MM HG: CPT | Performed by: FAMILY MEDICINE

## 2023-07-14 PROCEDURE — 3051F HG A1C>EQUAL 7.0%<8.0%: CPT | Performed by: FAMILY MEDICINE

## 2023-07-14 PROCEDURE — 3017F COLORECTAL CA SCREEN DOC REV: CPT | Performed by: FAMILY MEDICINE

## 2023-07-14 PROCEDURE — 1123F ACP DISCUSS/DSCN MKR DOCD: CPT | Performed by: FAMILY MEDICINE

## 2023-07-14 PROCEDURE — G0296 VISIT TO DETERM LDCT ELIG: HCPCS | Performed by: FAMILY MEDICINE

## 2023-07-14 RX ORDER — TIRZEPATIDE 5 MG/.5ML
INJECTION, SOLUTION SUBCUTANEOUS
Qty: 4 ADJUSTABLE DOSE PRE-FILLED PEN SYRINGE | Refills: 5 | Status: SHIPPED | OUTPATIENT
Start: 2023-07-14

## 2023-07-14 RX ORDER — TIRZEPATIDE 2.5 MG/.5ML
INJECTION, SOLUTION SUBCUTANEOUS
Qty: 4 ADJUSTABLE DOSE PRE-FILLED PEN SYRINGE | Refills: 0 | Status: SHIPPED | OUTPATIENT
Start: 2023-07-14

## 2023-07-14 RX ORDER — CYCLOBENZAPRINE HCL 10 MG
TABLET ORAL
COMMUNITY
Start: 2023-06-18

## 2023-07-14 ASSESSMENT — ENCOUNTER SYMPTOMS
SHORTNESS OF BREATH: 0
NAUSEA: 0
BLURRED VISION: 0
CONSTIPATION: 0
ABDOMINAL PAIN: 1
WHEEZING: 0
RHINORRHEA: 0
COUGH: 0
VISUAL CHANGE: 0

## 2023-07-19 ENCOUNTER — PATIENT MESSAGE (OUTPATIENT)
Dept: FAMILY MEDICINE CLINIC | Facility: CLINIC | Age: 68
End: 2023-07-19

## 2023-07-20 RX ORDER — INSULIN DEGLUDEC 200 U/ML
INJECTION, SOLUTION SUBCUTANEOUS
Qty: 18 ML | Refills: 5 | Status: SHIPPED | OUTPATIENT
Start: 2023-07-20

## 2023-07-20 ASSESSMENT — ENCOUNTER SYMPTOMS
COLOR CHANGE: 0
DIARRHEA: 1
BACK PAIN: 1
VOMITING: 1

## 2023-07-20 NOTE — TELEPHONE ENCOUNTER
From: Brant Pena  To: Dr. Christine Thomson: 7/19/2023 6:06 PM EDT  Subject: Mariam Wilson    I need a new prescription as the old one ran out last month. it can be called in at [facility] on file.   Thanks, Joanna Leader

## 2023-07-26 ENCOUNTER — TELEPHONE (OUTPATIENT)
Dept: FAMILY MEDICINE CLINIC | Facility: CLINIC | Age: 68
End: 2023-07-26

## 2023-07-26 DIAGNOSIS — K27.9 PUD (PEPTIC ULCER DISEASE): Primary | ICD-10-CM

## 2023-07-26 RX ORDER — SUCRALFATE 1 G/1
1 TABLET ORAL 4 TIMES DAILY
Qty: 120 TABLET | Refills: 3 | Status: SHIPPED | OUTPATIENT
Start: 2023-07-26

## 2023-07-26 NOTE — TELEPHONE ENCOUNTER
Pt called in stating that she was seen last week and thought she mightve had just a stomach bug but since it has gotten worse. Pt states she cant eat ro drink without it burning going down and her throwing it up. Having severe stomach pain with it and stomach burning. Pt wants something for pain if can happen. Please advise.

## 2023-07-26 NOTE — TELEPHONE ENCOUNTER
If she is not able to keep anything down she really should be seen. She may need to go to the emergency room for evaluation. I have sent her in something to hopefully coat the stomach to make it feel better. Prescription(s) refilled  It (they) has been escribed to the pharmacy. Requested Prescriptions     Signed Prescriptions Disp Refills    sucralfate (CARAFATE) 1 GM tablet 120 tablet 3     Sig: Take 1 tablet by mouth 4 times daily     Authorizing Provider: TAB CALL     No orders of the defined types were placed in this encounter. ICD-10-CM    1.  PUD (peptic ulcer disease)  K27.9 sucralfate (CARAFATE) 1 GM tablet

## 2023-07-26 NOTE — TELEPHONE ENCOUNTER
Spoke with pt about what Dr. Jair Dunn said let her know and rx was sent in, and advised her of she still can not keep anything to eat or drink down she needs to go to ER to be evaluated. Pt voiced understanding.

## 2023-08-08 DIAGNOSIS — E11.21 TYPE 2 DIABETES WITH NEPHROPATHY (HCC): ICD-10-CM

## 2023-08-08 RX ORDER — TIRZEPATIDE 2.5 MG/.5ML
INJECTION, SOLUTION SUBCUTANEOUS
Qty: 2 ML | Refills: 0 | OUTPATIENT
Start: 2023-08-08

## 2023-08-08 NOTE — TELEPHONE ENCOUNTER
Exercise Session Details Prescription(s) denied. Note has been escribed to the pharmacy.     Requested Prescriptions     Refused Prescriptions Disp Refills    Tirzepatide (MOUNJARO) 2.5 MG/0.5ML SOPN SC injection [Pharmacy Med Name: MOUNJARO 2.5 MG/0.5 ML PEN] 2 mL 0     Sig: INJECT 2.5 MG (0.5 ML) INTO THE SKIN ONCE A WEEK FOR DIABETES THEN INCREASE TO THE 5 MG DOSE     Refused By: Liliana CUEVAS     Reason for Refusal: Refill not appropriate

## 2023-08-09 DIAGNOSIS — E11.21 TYPE 2 DIABETES WITH NEPHROPATHY (HCC): ICD-10-CM

## 2023-08-09 RX ORDER — TIRZEPATIDE 2.5 MG/.5ML
INJECTION, SOLUTION SUBCUTANEOUS
Qty: 2 ML | Refills: 0 | OUTPATIENT
Start: 2023-08-09

## 2023-08-10 ENCOUNTER — HOSPITAL ENCOUNTER (OUTPATIENT)
Dept: MAMMOGRAPHY | Age: 68
Discharge: HOME | End: 2023-08-10
Attending: FAMILY MEDICINE
Payer: MEDICARE

## 2023-08-10 ENCOUNTER — APPOINTMENT (OUTPATIENT)
Dept: MAMMOGRAPHY | Age: 68
End: 2023-08-10
Attending: FAMILY MEDICINE
Payer: MEDICARE

## 2023-08-10 DIAGNOSIS — Z12.31 ENCOUNTER FOR SCREENING MAMMOGRAM FOR BREAST CANCER: ICD-10-CM

## 2023-08-10 PROCEDURE — 77063 BREAST TOMOSYNTHESIS BI: CPT

## 2023-08-14 ENCOUNTER — HOSPITAL ENCOUNTER (EMERGENCY)
Age: 68
Discharge: HOME | End: 2023-08-14
Attending: EMERGENCY MEDICINE
Payer: MEDICARE

## 2023-08-14 ENCOUNTER — APPOINTMENT (OUTPATIENT)
Dept: CT IMAGING | Age: 68
End: 2023-08-14
Payer: MEDICARE

## 2023-08-14 VITALS
SYSTOLIC BLOOD PRESSURE: 151 MMHG | OXYGEN SATURATION: 92 % | BODY MASS INDEX: 25.52 KG/M2 | HEART RATE: 89 BPM | TEMPERATURE: 98.2 F | RESPIRATION RATE: 16 BRPM | HEIGHT: 60 IN | WEIGHT: 130 LBS | DIASTOLIC BLOOD PRESSURE: 67 MMHG

## 2023-08-14 DIAGNOSIS — D72.828 OTHER ELEVATED WHITE BLOOD CELL (WBC) COUNT: ICD-10-CM

## 2023-08-14 DIAGNOSIS — R10.84 GENERALIZED ABDOMINAL PAIN: ICD-10-CM

## 2023-08-14 DIAGNOSIS — M41.80 LEVOSCOLIOSIS: ICD-10-CM

## 2023-08-14 DIAGNOSIS — N20.0 KIDNEY STONE: ICD-10-CM

## 2023-08-14 DIAGNOSIS — N28.9 RENAL LESION: ICD-10-CM

## 2023-08-14 DIAGNOSIS — M62.830 SPASM OF MUSCLE OF LOWER BACK: Primary | ICD-10-CM

## 2023-08-14 LAB
ALBUMIN SERPL-MCNC: 3.9 G/DL (ref 3.2–4.6)
ALBUMIN/GLOB SERPL: 0.9 (ref 0.4–1.6)
ALP SERPL-CCNC: 93 U/L (ref 50–136)
ALT SERPL-CCNC: 20 U/L (ref 12–65)
ANION GAP SERPL CALC-SCNC: 4 MMOL/L (ref 2–11)
APPEARANCE UR: CLEAR
AST SERPL-CCNC: 13 U/L (ref 15–37)
BACTERIA URNS QL MICRO: NEGATIVE /HPF
BASOPHILS # BLD: 0.1 K/UL (ref 0–0.2)
BASOPHILS NFR BLD: 0 % (ref 0–2)
BILIRUB SERPL-MCNC: 0.4 MG/DL (ref 0.2–1.1)
BILIRUB UR QL: NEGATIVE
BUN SERPL-MCNC: 10 MG/DL (ref 8–23)
CALCIUM SERPL-MCNC: 10 MG/DL (ref 8.3–10.4)
CASTS URNS QL MICRO: ABNORMAL /LPF
CHLORIDE SERPL-SCNC: 104 MMOL/L (ref 101–110)
CO2 SERPL-SCNC: 26 MMOL/L (ref 21–32)
COLOR UR: ABNORMAL
CREAT SERPL-MCNC: 0.8 MG/DL (ref 0.6–1)
DIFFERENTIAL METHOD BLD: ABNORMAL
EOSINOPHIL # BLD: 0.1 K/UL (ref 0–0.8)
EOSINOPHIL NFR BLD: 1 % (ref 0.5–7.8)
EPI CELLS #/AREA URNS HPF: ABNORMAL /HPF
ERYTHROCYTE [DISTWIDTH] IN BLOOD BY AUTOMATED COUNT: 13.2 % (ref 11.9–14.6)
GLOBULIN SER CALC-MCNC: 4.5 G/DL (ref 2.8–4.5)
GLUCOSE SERPL-MCNC: 170 MG/DL (ref 65–100)
GLUCOSE UR STRIP.AUTO-MCNC: >1000 MG/DL
HCT VFR BLD AUTO: 48.4 % (ref 35.8–46.3)
HGB BLD-MCNC: 16 G/DL (ref 11.7–15.4)
HGB UR QL STRIP: NEGATIVE
IMM GRANULOCYTES # BLD AUTO: 0.1 K/UL (ref 0–0.5)
IMM GRANULOCYTES NFR BLD AUTO: 1 % (ref 0–5)
KETONES UR QL STRIP.AUTO: NEGATIVE MG/DL
LACTATE SERPL-SCNC: 0.9 MMOL/L (ref 0.4–2)
LEUKOCYTE ESTERASE UR QL STRIP.AUTO: NEGATIVE
LYMPHOCYTES # BLD: 3.4 K/UL (ref 0.5–4.6)
LYMPHOCYTES NFR BLD: 23 % (ref 13–44)
MCH RBC QN AUTO: 29.2 PG (ref 26.1–32.9)
MCHC RBC AUTO-ENTMCNC: 33.1 G/DL (ref 31.4–35)
MCV RBC AUTO: 88.3 FL (ref 82–102)
MONOCYTES # BLD: 0.7 K/UL (ref 0.1–1.3)
MONOCYTES NFR BLD: 4 % (ref 4–12)
NEUTS SEG # BLD: 10.8 K/UL (ref 1.7–8.2)
NEUTS SEG NFR BLD: 71 % (ref 43–78)
NITRITE UR QL STRIP.AUTO: NEGATIVE
NRBC # BLD: 0 K/UL (ref 0–0.2)
PH UR STRIP: 7 (ref 5–9)
PLATELET # BLD AUTO: 404 K/UL (ref 150–450)
PMV BLD AUTO: 7.8 FL (ref 9.4–12.3)
POTASSIUM SERPL-SCNC: 3.8 MMOL/L (ref 3.5–5.1)
PROCALCITONIN SERPL-MCNC: <0.05 NG/ML (ref 0–0.49)
PROT SERPL-MCNC: 8.4 G/DL (ref 6.3–8.2)
PROT UR STRIP-MCNC: 100 MG/DL
RBC # BLD AUTO: 5.48 M/UL (ref 4.05–5.2)
RBC #/AREA URNS HPF: ABNORMAL /HPF
SODIUM SERPL-SCNC: 134 MMOL/L (ref 133–143)
SP GR UR REFRACTOMETRY: 1.03 (ref 1–1.02)
UROBILINOGEN UR QL STRIP.AUTO: 0.2 EU/DL (ref 0.2–1)
WBC # BLD AUTO: 15.1 K/UL (ref 4.3–11.1)
WBC URNS QL MICRO: ABNORMAL /HPF

## 2023-08-14 PROCEDURE — 81001 URINALYSIS AUTO W/SCOPE: CPT

## 2023-08-14 PROCEDURE — 96375 TX/PRO/DX INJ NEW DRUG ADDON: CPT

## 2023-08-14 PROCEDURE — 80053 COMPREHEN METABOLIC PANEL: CPT

## 2023-08-14 PROCEDURE — 85025 COMPLETE CBC W/AUTO DIFF WBC: CPT

## 2023-08-14 PROCEDURE — 96372 THER/PROPH/DIAG INJ SC/IM: CPT

## 2023-08-14 PROCEDURE — 74177 CT ABD & PELVIS W/CONTRAST: CPT

## 2023-08-14 PROCEDURE — 6360000002 HC RX W HCPCS

## 2023-08-14 PROCEDURE — 84145 PROCALCITONIN (PCT): CPT

## 2023-08-14 PROCEDURE — 87040 BLOOD CULTURE FOR BACTERIA: CPT

## 2023-08-14 PROCEDURE — 96365 THER/PROPH/DIAG IV INF INIT: CPT

## 2023-08-14 PROCEDURE — 99285 EMERGENCY DEPT VISIT HI MDM: CPT

## 2023-08-14 PROCEDURE — 6370000000 HC RX 637 (ALT 250 FOR IP)

## 2023-08-14 PROCEDURE — 2580000003 HC RX 258

## 2023-08-14 PROCEDURE — 6360000004 HC RX CONTRAST MEDICATION

## 2023-08-14 PROCEDURE — 83605 ASSAY OF LACTIC ACID: CPT

## 2023-08-14 RX ORDER — 0.9 % SODIUM CHLORIDE 0.9 %
1000 INTRAVENOUS SOLUTION INTRAVENOUS ONCE
Status: COMPLETED | OUTPATIENT
Start: 2023-08-14 | End: 2023-08-14

## 2023-08-14 RX ORDER — LIDOCAINE 50 MG/G
1 PATCH TOPICAL DAILY
Qty: 14 PATCH | Refills: 0 | Status: SHIPPED | OUTPATIENT
Start: 2023-08-14 | End: 2023-08-28

## 2023-08-14 RX ORDER — METHOCARBAMOL 750 MG/1
750 TABLET, FILM COATED ORAL 3 TIMES DAILY
Qty: 30 TABLET | Refills: 0 | Status: SHIPPED | OUTPATIENT
Start: 2023-08-14 | End: 2023-08-24

## 2023-08-14 RX ORDER — ACETAMINOPHEN 500 MG
1000 TABLET ORAL
Status: COMPLETED | OUTPATIENT
Start: 2023-08-14 | End: 2023-08-14

## 2023-08-14 RX ORDER — ONDANSETRON 2 MG/ML
4 INJECTION INTRAMUSCULAR; INTRAVENOUS
Status: COMPLETED | OUTPATIENT
Start: 2023-08-14 | End: 2023-08-14

## 2023-08-14 RX ORDER — MORPHINE SULFATE 4 MG/ML
4 INJECTION INTRAVENOUS ONCE
Status: COMPLETED | OUTPATIENT
Start: 2023-08-14 | End: 2023-08-14

## 2023-08-14 RX ADMIN — MORPHINE SULFATE 4 MG: 4 INJECTION INTRAVENOUS at 12:56

## 2023-08-14 RX ADMIN — CEFTRIAXONE 1000 MG: 1 INJECTION, POWDER, FOR SOLUTION INTRAMUSCULAR; INTRAVENOUS at 12:56

## 2023-08-14 RX ADMIN — IOPAMIDOL 100 ML: 755 INJECTION, SOLUTION INTRAVENOUS at 13:13

## 2023-08-14 RX ADMIN — ONDANSETRON 4 MG: 2 INJECTION INTRAMUSCULAR; INTRAVENOUS at 12:57

## 2023-08-14 RX ADMIN — SODIUM CHLORIDE 1000 ML: 9 INJECTION, SOLUTION INTRAVENOUS at 13:04

## 2023-08-14 RX ADMIN — ACETAMINOPHEN 1000 MG: 500 TABLET, FILM COATED ORAL at 12:57

## 2023-08-14 ASSESSMENT — ENCOUNTER SYMPTOMS
COLOR CHANGE: 0
NAUSEA: 0
SHORTNESS OF BREATH: 0
BACK PAIN: 1
ABDOMINAL PAIN: 1
VOMITING: 0
DIARRHEA: 0

## 2023-08-14 ASSESSMENT — PAIN SCALES - GENERAL
PAINLEVEL_OUTOF10: 10
PAINLEVEL_OUTOF10: 10
PAINLEVEL_OUTOF10: 5
PAINLEVEL_OUTOF10: 10

## 2023-08-14 ASSESSMENT — PAIN DESCRIPTION - FREQUENCY: FREQUENCY: CONTINUOUS

## 2023-08-14 ASSESSMENT — PAIN DESCRIPTION - DESCRIPTORS: DESCRIPTORS: ACHING;DISCOMFORT

## 2023-08-14 ASSESSMENT — LIFESTYLE VARIABLES
HOW OFTEN DO YOU HAVE A DRINK CONTAINING ALCOHOL: NEVER
HOW MANY STANDARD DRINKS CONTAINING ALCOHOL DO YOU HAVE ON A TYPICAL DAY: PATIENT DOES NOT DRINK

## 2023-08-14 ASSESSMENT — PAIN DESCRIPTION - PAIN TYPE
TYPE: ACUTE PAIN
TYPE: CHRONIC PAIN

## 2023-08-14 ASSESSMENT — PAIN DESCRIPTION - LOCATION
LOCATION: BACK;HIP

## 2023-08-14 ASSESSMENT — PAIN - FUNCTIONAL ASSESSMENT: PAIN_FUNCTIONAL_ASSESSMENT: 0-10

## 2023-08-14 ASSESSMENT — PAIN DESCRIPTION - ORIENTATION: ORIENTATION: LEFT

## 2023-08-14 NOTE — ED PROVIDER NOTES
Emergency Department Provider Note       PCP: Joaquin Otto MD   Age: 70 y.o. Sex: female     DISPOSITION Decision To Discharge 08/14/2023 01:57:19 PM       ICD-10-CM    1. Spasm of muscle of lower back  M62.830 methocarbamol (ROBAXIN-750) 750 MG tablet     lidocaine (LIDODERM) 5 %      2. Generalized abdominal pain  R10.84       3. Levoscoliosis  M41.80       4. Other elevated white blood cell (WBC) count  D72.828       5. Kidney stone  N20.0 [address] [facility]    6 mm right      6. Renal lesion  N28.9 211 [facility] Decision Making     Complexity of Problems Addressed:  1 or more chronic illnesses with a severe exacerbation or progression. 1 or more acute illnesses that pose a threat to life or bodily function. Data Reviewed and Analyzed:   I independently ordered and reviewed each unique test.  I reviewed external records: provider visit note from PCP. I reviewed external records: previous lab results from outside ED. I reviewed external records: previous imaging study including radiologist interpretation. Notes from primary care visits on 6/13/2023 and 7/14/2023; reviewed outside lab work; reviewed outside imaging including an MRI of the lumbar spine from 7/27/2021          I interpreted the CT Scan CT abdomen pelvis with IV contrast demonstrates significant levoscoliosis, 6 mm stone within the right kidney that is nonobstructing, renal lesions that are felt to be benign. Negative for any acute intra-abdominal abnormality. I am in agreement with radiologist interpretation. Discussion of management or test interpretation.   Vital signs reviewed, patient stable, NAD, afebrile, nontoxic in appearance   Heart rate 82, O2 saturation 95% on room air    This is a 50-year-old female with history of chronic low back pain, chronic left hip pain who presents emergency department for worsening of chronic pain although she states

## 2023-08-14 NOTE — DISCHARGE INSTRUCTIONS
You were evaluated in the emergency department today for left-sided low back pain. CT imaging demonstrates chronic degenerative changes in your back and severe scoliosis no bowel obstruction on, no emergent findings    You do have some's lesions in your kidneys which will need follow-up however radiologist believes that these are statistically benign. You also have a 6 mm nonobstructing stone in your right kidney. I have put in a referral to urology for follow-up on kidney stone and lesions noted on your kidneys    Overall your lab work today is very reassuring without any concerning findings. I have written you a prescription for Robaxin muscle relaxer. Take caution and to understand of this medication affects you. Do not drink alcohol in this medication. Do not take your other muscle relaxers while taking this medication. You stated you are not interested in any steroids. You cannot take NSAIDs. I do recommend that you take extra strength Tylenol. I have written you a stronger prescription strength lidocaine patch to be worn.   Wear for 12 hours and then go 12 hours without wearing a patch    Contact your primary care provider today to schedule a follow-up within the next week    Return to the emergency department for chest pain, shortness of breath, signs and symptoms of stroke as listed in your discharge paperwork

## 2023-08-14 NOTE — ED TRIAGE NOTES
Pt reports chronic back to L hip pain that worsened since last night radiating to L groin (-)urinary changes, injury/trauma   A&Ox4

## 2023-08-16 ENCOUNTER — TELEPHONE (OUTPATIENT)
Dept: FAMILY MEDICINE CLINIC | Facility: CLINIC | Age: 68
End: 2023-08-16

## 2023-08-16 ENCOUNTER — OFFICE VISIT (OUTPATIENT)
Dept: FAMILY MEDICINE CLINIC | Facility: CLINIC | Age: 68
End: 2023-08-16
Payer: MEDICARE

## 2023-08-16 VITALS
SYSTOLIC BLOOD PRESSURE: 107 MMHG | HEIGHT: 60 IN | DIASTOLIC BLOOD PRESSURE: 56 MMHG | OXYGEN SATURATION: 96 % | WEIGHT: 130 LBS | HEART RATE: 78 BPM | BODY MASS INDEX: 25.52 KG/M2 | TEMPERATURE: 97.3 F | RESPIRATION RATE: 16 BRPM

## 2023-08-16 DIAGNOSIS — K27.9 PUD (PEPTIC ULCER DISEASE): ICD-10-CM

## 2023-08-16 DIAGNOSIS — M25.552 LEFT HIP PAIN: ICD-10-CM

## 2023-08-16 DIAGNOSIS — N20.0 RIGHT NEPHROLITHIASIS: ICD-10-CM

## 2023-08-16 DIAGNOSIS — Z79.4 TYPE 2 DIABETES MELLITUS WITH CHRONIC KIDNEY DISEASE, WITH LONG-TERM CURRENT USE OF INSULIN, UNSPECIFIED CKD STAGE (HCC): ICD-10-CM

## 2023-08-16 DIAGNOSIS — R11.0 NAUSEA: ICD-10-CM

## 2023-08-16 DIAGNOSIS — M25.552 LEFT HIP PAIN: Primary | ICD-10-CM

## 2023-08-16 DIAGNOSIS — E11.21 TYPE 2 DIABETES WITH NEPHROPATHY (HCC): ICD-10-CM

## 2023-08-16 DIAGNOSIS — S73.005S DISLOCATION OF LEFT HIP, SEQUELA: ICD-10-CM

## 2023-08-16 DIAGNOSIS — Z12.11 SCREEN FOR COLON CANCER: ICD-10-CM

## 2023-08-16 DIAGNOSIS — F33.1 MAJOR DEPRESSIVE DISORDER, RECURRENT EPISODE, MODERATE (HCC): Primary | ICD-10-CM

## 2023-08-16 DIAGNOSIS — M41.86 LEVOSCOLIOSIS OF LUMBAR SPINE: ICD-10-CM

## 2023-08-16 DIAGNOSIS — E11.22 TYPE 2 DIABETES MELLITUS WITH CHRONIC KIDNEY DISEASE, WITH LONG-TERM CURRENT USE OF INSULIN, UNSPECIFIED CKD STAGE (HCC): ICD-10-CM

## 2023-08-16 PROCEDURE — 4004F PT TOBACCO SCREEN RCVD TLK: CPT | Performed by: FAMILY MEDICINE

## 2023-08-16 PROCEDURE — 1123F ACP DISCUSS/DSCN MKR DOCD: CPT | Performed by: FAMILY MEDICINE

## 2023-08-16 PROCEDURE — 3078F DIAST BP <80 MM HG: CPT | Performed by: FAMILY MEDICINE

## 2023-08-16 PROCEDURE — 3074F SYST BP LT 130 MM HG: CPT | Performed by: FAMILY MEDICINE

## 2023-08-16 PROCEDURE — 3051F HG A1C>EQUAL 7.0%<8.0%: CPT | Performed by: FAMILY MEDICINE

## 2023-08-16 PROCEDURE — G8427 DOCREV CUR MEDS BY ELIG CLIN: HCPCS | Performed by: FAMILY MEDICINE

## 2023-08-16 PROCEDURE — 99214 OFFICE O/P EST MOD 30 MIN: CPT | Performed by: FAMILY MEDICINE

## 2023-08-16 PROCEDURE — 3017F COLORECTAL CA SCREEN DOC REV: CPT | Performed by: FAMILY MEDICINE

## 2023-08-16 PROCEDURE — G8400 PT W/DXA NO RESULTS DOC: HCPCS | Performed by: FAMILY MEDICINE

## 2023-08-16 PROCEDURE — 2022F DILAT RTA XM EVC RTNOPTHY: CPT | Performed by: FAMILY MEDICINE

## 2023-08-16 PROCEDURE — 1090F PRES/ABSN URINE INCON ASSESS: CPT | Performed by: FAMILY MEDICINE

## 2023-08-16 PROCEDURE — G8417 CALC BMI ABV UP PARAM F/U: HCPCS | Performed by: FAMILY MEDICINE

## 2023-08-16 RX ORDER — MORPHINE SULFATE 15 MG/1
15 TABLET ORAL 3 TIMES DAILY PRN
Qty: 90 TABLET | Refills: 0 | Status: SHIPPED | OUTPATIENT
Start: 2023-08-16 | End: 2023-09-15

## 2023-08-16 RX ORDER — LIDOCAINE 50 MG/G
PATCH TOPICAL
Qty: 90 PATCH | Refills: 2 | Status: SHIPPED | OUTPATIENT
Start: 2023-08-16

## 2023-08-16 RX ORDER — CYCLOBENZAPRINE HCL 10 MG
TABLET ORAL
COMMUNITY
Start: 2023-08-15

## 2023-08-16 RX ORDER — EMPAGLIFLOZIN 10 MG/1
TABLET, FILM COATED ORAL
COMMUNITY
Start: 2023-07-29

## 2023-08-16 ASSESSMENT — PATIENT HEALTH QUESTIONNAIRE - PHQ9
2. FEELING DOWN, DEPRESSED OR HOPELESS: 1
3. TROUBLE FALLING OR STAYING ASLEEP: 3
SUM OF ALL RESPONSES TO PHQ QUESTIONS 1-9: 16
9. THOUGHTS THAT YOU WOULD BE BETTER OFF DEAD, OR OF HURTING YOURSELF: 0
1. LITTLE INTEREST OR PLEASURE IN DOING THINGS: 0
SUM OF ALL RESPONSES TO PHQ9 QUESTIONS 1 & 2: 1
SUM OF ALL RESPONSES TO PHQ QUESTIONS 1-9: 16
SUM OF ALL RESPONSES TO PHQ QUESTIONS 1-9: 16
6. FEELING BAD ABOUT YOURSELF - OR THAT YOU ARE A FAILURE OR HAVE LET YOURSELF OR YOUR FAMILY DOWN: 0
4. FEELING TIRED OR HAVING LITTLE ENERGY: 3
5. POOR APPETITE OR OVEREATING: 3
8. MOVING OR SPEAKING SO SLOWLY THAT OTHER PEOPLE COULD HAVE NOTICED. OR THE OPPOSITE, BEING SO FIGETY OR RESTLESS THAT YOU HAVE BEEN MOVING AROUND A LOT MORE THAN USUAL: 3
SUM OF ALL RESPONSES TO PHQ QUESTIONS 1-9: 16
10. IF YOU CHECKED OFF ANY PROBLEMS, HOW DIFFICULT HAVE THESE PROBLEMS MADE IT FOR YOU TO DO YOUR WORK, TAKE CARE OF THINGS AT HOME, OR GET ALONG WITH OTHER PEOPLE: 3
7. TROUBLE CONCENTRATING ON THINGS, SUCH AS READING THE NEWSPAPER OR WATCHING TELEVISION: 3

## 2023-08-16 NOTE — TELEPHONE ENCOUNTER
Patients insurance will not cover Lidoderm but they will cover Lidocaine    if possible please call in to [facility] [employer]

## 2023-08-16 NOTE — TELEPHONE ENCOUNTER
Prescription(s) refilled  It (they) has been escribed to the pharmacy. Requested Prescriptions     Signed Prescriptions Disp Refills    lidocaine (LIDODERM) 5 % 90 patch 2     Sig: Place 1 -3 patches onto the skin daily 12 hours on, 12 hours off. Authorizing Provider: TAB CALL     No orders of the defined types were placed in this encounter. ICD-10-CM    1.  Left hip pain  M25.552 lidocaine (LIDODERM) 5 %

## 2023-08-19 LAB
BACTERIA SPEC CULT: NORMAL
SERVICE CMNT-IMP: NORMAL

## 2023-08-21 ENCOUNTER — PATIENT MESSAGE (OUTPATIENT)
Dept: FAMILY MEDICINE CLINIC | Facility: CLINIC | Age: 68
End: 2023-08-21

## 2023-08-21 DIAGNOSIS — M25.552 LEFT HIP PAIN: ICD-10-CM

## 2023-08-21 DIAGNOSIS — M79.2 NEUROPATHIC PAIN: Primary | ICD-10-CM

## 2023-08-22 ENCOUNTER — HOSPITAL ENCOUNTER (OUTPATIENT)
Dept: CT IMAGING | Age: 68
Discharge: HOME | End: 2023-08-25
Attending: FAMILY MEDICINE
Payer: MEDICARE

## 2023-08-22 DIAGNOSIS — Z87.891 PERSONAL HISTORY OF TOBACCO USE: ICD-10-CM

## 2023-08-22 PROCEDURE — 71271 CT THORAX LUNG CANCER SCR C-: CPT

## 2023-08-23 ASSESSMENT — ENCOUNTER SYMPTOMS
WHEEZING: 0
VOMITING: 0
SHORTNESS OF BREATH: 0
CONSTIPATION: 0
RHINORRHEA: 0
COUGH: 0
NAUSEA: 0
BACK PAIN: 0
DIARRHEA: 0
ABDOMINAL PAIN: 0
COLOR CHANGE: 0
BLURRED VISION: 0
VISUAL CHANGE: 0

## 2023-08-29 ENCOUNTER — HOSPITAL ENCOUNTER (OUTPATIENT)
Dept: MAMMOGRAPHY | Age: 68
Discharge: HOME | End: 2023-09-01
Attending: FAMILY MEDICINE
Payer: MEDICARE

## 2023-08-29 DIAGNOSIS — Z78.0 ASYMPTOMATIC MENOPAUSE: ICD-10-CM

## 2023-08-29 PROCEDURE — 77080 DXA BONE DENSITY AXIAL: CPT

## 2023-08-30 ENCOUNTER — PREP FOR PROCEDURE (OUTPATIENT)
Dept: UROLOGY | Age: 68
End: 2023-08-30

## 2023-08-30 ENCOUNTER — OFFICE VISIT (OUTPATIENT)
Dept: UROLOGY | Age: 68
End: 2023-08-30
Payer: MEDICARE

## 2023-08-30 ENCOUNTER — TELEPHONE (OUTPATIENT)
Dept: UROLOGY | Age: 68
End: 2023-08-30

## 2023-08-30 VITALS
WEIGHT: 130 LBS | SYSTOLIC BLOOD PRESSURE: 111 MMHG | HEART RATE: 86 BPM | OXYGEN SATURATION: 99 % | HEIGHT: 60 IN | DIASTOLIC BLOOD PRESSURE: 63 MMHG | BODY MASS INDEX: 25.52 KG/M2

## 2023-08-30 DIAGNOSIS — N20.0 KIDNEY STONE: Primary | ICD-10-CM

## 2023-08-30 LAB
BILIRUBIN, URINE, POC: NEGATIVE
BLOOD URINE, POC: NORMAL
GLUCOSE URINE, POC: 250
KETONES, URINE, POC: NEGATIVE
LEUKOCYTE ESTERASE, URINE, POC: NORMAL
NITRITE, URINE, POC: NEGATIVE
PH, URINE, POC: 6.5 (ref 4.6–8)
PROTEIN,URINE, POC: NEGATIVE
SPECIFIC GRAVITY, URINE, POC: 1.01 (ref 1–1.03)
URINALYSIS CLARITY, POC: NORMAL
URINALYSIS COLOR, POC: NORMAL
UROBILINOGEN, POC: NORMAL

## 2023-08-30 PROCEDURE — 74018 RADEX ABDOMEN 1 VIEW: CPT | Performed by: UROLOGY

## 2023-08-30 PROCEDURE — G8427 DOCREV CUR MEDS BY ELIG CLIN: HCPCS | Performed by: UROLOGY

## 2023-08-30 PROCEDURE — 81003 URINALYSIS AUTO W/O SCOPE: CPT | Performed by: UROLOGY

## 2023-08-30 PROCEDURE — 4004F PT TOBACCO SCREEN RCVD TLK: CPT | Performed by: UROLOGY

## 2023-08-30 PROCEDURE — 99204 OFFICE O/P NEW MOD 45 MIN: CPT | Performed by: UROLOGY

## 2023-08-30 PROCEDURE — G8399 PT W/DXA RESULTS DOCUMENT: HCPCS | Performed by: UROLOGY

## 2023-08-30 PROCEDURE — G8417 CALC BMI ABV UP PARAM F/U: HCPCS | Performed by: UROLOGY

## 2023-08-30 PROCEDURE — 3017F COLORECTAL CA SCREEN DOC REV: CPT | Performed by: UROLOGY

## 2023-08-30 PROCEDURE — 1123F ACP DISCUSS/DSCN MKR DOCD: CPT | Performed by: UROLOGY

## 2023-08-30 PROCEDURE — 3078F DIAST BP <80 MM HG: CPT | Performed by: UROLOGY

## 2023-08-30 PROCEDURE — 3074F SYST BP LT 130 MM HG: CPT | Performed by: UROLOGY

## 2023-08-30 PROCEDURE — 1090F PRES/ABSN URINE INCON ASSESS: CPT | Performed by: UROLOGY

## 2023-08-30 ASSESSMENT — ENCOUNTER SYMPTOMS
WHEEZING: 1
CONSTIPATION: 1
VOMITING: 1
INDIGESTION: 1
NAUSEA: 1
EYE DISCHARGE: 0
BACK PAIN: 1
HEARTBURN: 1

## 2023-08-30 NOTE — TELEPHONE ENCOUNTER
----- Message from Elizabeth Shannon DO sent at 8/30/2023 10:47 AM EDT -----  Regarding: surg  R ESWL  1h  Gen  Outpt  Cbc, bmp, ua, ucx  Cipro 400mg iV preop  No rush-renal stone

## 2023-08-30 NOTE — PROGRESS NOTES
[facility]  [address] [address]  [phone]    Lina [facility]  : 1955      Chief Complaint   Patient presents with    Nephrolithiasis        HPI   70 y.o., female who is referred by Dr. Holland Manzo for evaluation of a R renal stone. Pt reports a recent history of L back pain. Denies hematuria or UTI's. CT on 23 shows subcm renal lesions that likely represent cysts and a 6mm R renal stone. KUB today shows a 6-7mm RLP renal stone with severe scoliosis. No prior stone episodes. Past Medical History:   Diagnosis Date    Arthritis     does not know kind-sees rheumatologist/ spine/ LLE/ L ft/toes; pt reports psoriatic    Bronchitis ? Aug 2013    no hospitalization    CAD (coronary artery disease)     EF= 65 %  left anterior descending coronary stenosis is 30 %. 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 Afshan Salazar    Mixed hyperlipidemia 3/15/2013    intolerant to medications    Nicotine vapor product user     Psychiatric disorder     depression and anxiety no med    PUD (peptic ulcer disease)     gastric    Renal insufficiency 3/15/2013    Has seen Dr. Geri Winn in the past.    Scoliosis, congenital     Thyroid nodule 3/15/2013    Type II or unspecified type diabetes mellitus without mention of complication, not stated as uncontrolled 3/15/2013      Is adjusting insulin at this time.       Past Surgical History:

## 2023-08-30 NOTE — RESULT ENCOUNTER NOTE
Your bone Density test shows that you have significant thinning of the bones that is consistent with osteoporosis. Make sure you increase your exercise. Make sure you are getting enough calcium in your diet and add vit D3 2000 iu each day WITH A MEAL THAT CONTAINS FAT. Vitamin D is a fat-soluble vitamin. You would really be a candidate for the medications that help strengthen your bones to help prevent fracture. If a healthy person such as yourself were to fall and break a hip, your life expectancy drops down to about 6 months on average. We may consider rechecking your bone density test after 2 years. Can we send medication to your pharmacy for you?

## 2023-09-05 PROBLEM — N20.0 KIDNEY STONE: Status: ACTIVE | Noted: 2023-09-05

## 2023-09-05 RX ORDER — LIDOCAINE 50 MG/G
PATCH TOPICAL
Qty: 90 PATCH | Refills: 2 | Status: SHIPPED | OUTPATIENT
Start: 2023-09-05

## 2023-09-05 NOTE — TELEPHONE ENCOUNTER
Denial received for this drug, placed in scanning. States that dx left hip pain is not a \"medically accepted indication\" .  No alternative listed

## 2023-09-05 NOTE — TELEPHONE ENCOUNTER
I have sent in with a different diagnosis to see if that will help. Prescription(s) refilled  It (they) has been escribed to the pharmacy. Requested Prescriptions     Signed Prescriptions Disp Refills    lidocaine (LIDODERM) 5 % 90 patch 2     Sig: Place 1 -3 patches onto the skin daily 12 hours on, 12 hours off. Authorizing Provider: TAB CALL     No orders of the defined types were placed in this encounter. ICD-10-CM    1. Neuropathic pain  M79.2 lidocaine (LIDODERM) 5 %      2.  Left hip pain  M25.552

## 2023-09-06 ENCOUNTER — TELEPHONE (OUTPATIENT)
Dept: UROLOGY | Age: 68
End: 2023-09-06

## 2023-09-06 NOTE — TELEPHONE ENCOUNTER
Mrs Darrius Ramos is scheduled for a lithotripsy for a kidney stone on 9/18. Dr Vane Roman is asking for clearance to hold Plavix 5 days prior to the procedure. Ok to hold?

## 2023-09-07 DIAGNOSIS — M81.0 OSTEOPOROSIS WITHOUT CURRENT PATHOLOGICAL FRACTURE, UNSPECIFIED OSTEOPOROSIS TYPE: Primary | ICD-10-CM

## 2023-09-07 RX ORDER — RISEDRONATE SODIUM 150 MG/1
TABLET, FILM COATED ORAL
Qty: 3 TABLET | Refills: 3 | Status: SHIPPED | OUTPATIENT
Start: 2023-09-07

## 2023-09-07 NOTE — PROGRESS NOTES
Prescription(s) refilled  It (they) has been escribed to the pharmacy. Requested Prescriptions     Signed Prescriptions Disp Refills    Risedronate Sodium 150 MG TABS 3 tablet 3     Si po q month first thing in the morning with full glass of water 30 min prior to other food or drink     No orders of the defined types were placed in this encounter. ICD-10-CM    1.  Osteoporosis without current pathological fracture, unspecified osteoporosis type  M81.0 Risedronate Sodium 150 MG TABS

## 2023-09-11 NOTE — TELEPHONE ENCOUNTER
Attempted to submit an appeal on the patients behalf, Appointment of Representative form has to be completed.  Pt states she will complete at her appt on 09/13 and will fax this and clinical documentation for the appeal then

## 2023-09-13 ENCOUNTER — HOSPITAL ENCOUNTER (OUTPATIENT)
Dept: SURGERY | Age: 68
Discharge: HOME | End: 2023-09-16
Payer: MEDICARE

## 2023-09-13 VITALS
OXYGEN SATURATION: 93 % | RESPIRATION RATE: 18 BRPM | DIASTOLIC BLOOD PRESSURE: 62 MMHG | SYSTOLIC BLOOD PRESSURE: 121 MMHG | HEIGHT: 60 IN | HEART RATE: 74 BPM | TEMPERATURE: 97.9 F | BODY MASS INDEX: 25.31 KG/M2 | WEIGHT: 128.9 LBS

## 2023-09-13 LAB
ANION GAP SERPL CALC-SCNC: 5 MMOL/L (ref 2–11)
APPEARANCE UR: CLEAR
BACTERIA URNS QL MICRO: NEGATIVE /HPF
BILIRUB UR QL: NEGATIVE
BUN SERPL-MCNC: 10 MG/DL (ref 8–23)
CALCIUM SERPL-MCNC: 9.7 MG/DL (ref 8.3–10.4)
CASTS URNS QL MICRO: ABNORMAL /LPF
CHLORIDE SERPL-SCNC: 106 MMOL/L (ref 101–110)
CO2 SERPL-SCNC: 28 MMOL/L (ref 21–32)
COLOR UR: ABNORMAL
CREAT SERPL-MCNC: 0.59 MG/DL (ref 0.6–1)
EPI CELLS #/AREA URNS HPF: ABNORMAL /HPF
ERYTHROCYTE [DISTWIDTH] IN BLOOD BY AUTOMATED COUNT: 13.2 % (ref 11.9–14.6)
GLUCOSE SERPL-MCNC: 72 MG/DL (ref 65–100)
GLUCOSE UR STRIP.AUTO-MCNC: 100 MG/DL
HCT VFR BLD AUTO: 45.1 % (ref 35.8–46.3)
HGB BLD-MCNC: 14.3 G/DL (ref 11.7–15.4)
HGB UR QL STRIP: NEGATIVE
KETONES UR QL STRIP.AUTO: NEGATIVE MG/DL
LEUKOCYTE ESTERASE UR QL STRIP.AUTO: ABNORMAL
MAGNESIUM SERPL-MCNC: 1.7 MG/DL (ref 1.8–2.4)
MCH RBC QN AUTO: 27.7 PG (ref 26.1–32.9)
MCHC RBC AUTO-ENTMCNC: 31.7 G/DL (ref 31.4–35)
MCV RBC AUTO: 87.4 FL (ref 82–102)
NITRITE UR QL STRIP.AUTO: NEGATIVE
NRBC # BLD: 0 K/UL (ref 0–0.2)
PH UR STRIP: 5.5 (ref 5–9)
PLATELET # BLD AUTO: 321 K/UL (ref 150–450)
PMV BLD AUTO: 8 FL (ref 9.4–12.3)
POTASSIUM SERPL-SCNC: 3.5 MMOL/L (ref 3.5–5.1)
PROT UR STRIP-MCNC: 30 MG/DL
RBC # BLD AUTO: 5.16 M/UL (ref 4.05–5.2)
RBC #/AREA URNS HPF: ABNORMAL /HPF
SODIUM SERPL-SCNC: 139 MMOL/L (ref 133–143)
SP GR UR REFRACTOMETRY: 1.01 (ref 1–1.02)
UROBILINOGEN UR QL STRIP.AUTO: 0.2 EU/DL (ref 0.2–1)
WBC # BLD AUTO: 15.6 K/UL (ref 4.3–11.1)
WBC URNS QL MICRO: ABNORMAL /HPF

## 2023-09-13 PROCEDURE — 81001 URINALYSIS AUTO W/SCOPE: CPT

## 2023-09-13 PROCEDURE — 83735 ASSAY OF MAGNESIUM: CPT

## 2023-09-13 PROCEDURE — 85027 COMPLETE CBC AUTOMATED: CPT

## 2023-09-13 PROCEDURE — 80048 BASIC METABOLIC PNL TOTAL CA: CPT

## 2023-09-13 PROCEDURE — 87086 URINE CULTURE/COLONY COUNT: CPT

## 2023-09-13 RX ORDER — OLMESARTAN MEDOXOMIL 40 MG/1
40 TABLET ORAL DAILY
COMMUNITY

## 2023-09-13 RX ORDER — CLOPIDOGREL BISULFATE 75 MG/1
75 TABLET ORAL DAILY
COMMUNITY

## 2023-09-13 ASSESSMENT — PAIN DESCRIPTION - LOCATION: LOCATION: BACK;HIP

## 2023-09-13 NOTE — PRE-PROCEDURE INSTRUCTIONS
Patient verified name and     Order for consent was found in EHR and matches case posting; patient verified. Type IB surgery, ealk in assessment complete. Labs per surgeon: CBC, BMP, UA. Labs per anesthesia protocol: POC Glucose and Potassium DOS. Anesthesia for abnormal labs. Documentation in Tasks that Dr. Judith Hyatt has reviewed labs. Hospital approved surgical skin cleanser and instructions given per hospital policy. Patient provided with and instructed on educational handouts including Guide to Surgery, Pain Management, Hand Hygiene, Blood Transfusion Education, and [facility] Brochure. Patient answered medical/surgical history questions at their best of ability. All prior to admission medications documented in [facility]. Original medication prescription bottle were visualized during patient appointment. Patient instructed to hold all vitamins 7 days prior to surgery and NSAIDS 5 days prior to surgery, patient verbalized understanding. Patient teach back successful and patient demonstrates knowledge of instructions.

## 2023-09-13 NOTE — PRE-PROCEDURE INSTRUCTIONS
PLEASE CONTINUE TAKING ALL PRESCRIPTION MEDICATIONS UP TO THE DAY OF SURGERY UNLESS OTHERWISE DIRECTED BELOW. DISCONTINUE all vitamins and supplements 7 days prior to surgery. DISCONTINUE Non-Steriodal Anti-Inflammatory (NSAIDS) such as Advil and Aleve 5 days prior to surgery. Home Medications to take  the day of surgery    Morphine and Promethazie (Phenergan) as needed for pain  Albuterol Inhaler as needed and bring it with you   Trevizo Cagejoel Insulin 80% of dose 38 units  Amlodipine (Norvasc)   Pantoprazole (Protonix)     Home Medications   to Hold   Montelukast (Singulair)  Insulin Lispro (Humalog)   Jardiance   Olmesartan (Benicar)  Spironolactone (Aldactone)  Cyclobenzaprine (Flexeril)  Sucralfate (Carafate)     Comments   Do not take Monjaro (Tirzepatide) this Friday 9/15/2023  The day before the procedure, Sunday, only have a clear liquid diet. CLEAR LIQUID DIET    Things included in a clear liquid diet:     Water  Black Coffee (may have sugar but no milk or cream)  Tea  Any type soft drink  Apple, Cranberry, or Grape juice  Chicken bouillon or broth  Beef bouillon or broth  Popsicles  Jell-O (any flavor), NO solid fruit mixed in  Hard candy that is clear, such as lifesavers or lemon drops    Things NOT included in clear liquid:     Milk and milk products  Milkshakes  Any solid food  Any solid soup with solid ingredients such as noodles  Any creamed soup  Gum or Mints  Orange juice (or any other juice containing pulp)          On the day before surgery please take Acetaminophen 1000mg in the morning and then again before bed. You may substitute for Tylenol 650 mg.    Per anesthesia protocol: If you feel symptoms of low blood sugar while fasting, check level. If low, drink only 4 ounces of a clear, sugary liquid and call pre-op at [phone]. Please do not bring home medications with you on the day of surgery unless otherwise directed by your nurse.   If you are instructed to bring home

## 2023-09-15 ENCOUNTER — TELEPHONE (OUTPATIENT)
Dept: FAMILY MEDICINE CLINIC | Facility: CLINIC | Age: 68
End: 2023-09-15

## 2023-09-15 NOTE — TELEPHONE ENCOUNTER
Maury Capone from [facility] calling to ask a few question about an appeal for medication lidocaine 5 % patch. Please give her a call at [phone]    Case# [id number]      Fax was sent to wrong # so we never received it.  Correct number given and fax to be resent to correct fax #

## 2023-09-16 LAB
BACTERIA SPEC CULT: NORMAL
BACTERIA SPEC CULT: NORMAL
SERVICE CMNT-IMP: NORMAL

## 2023-09-17 ENCOUNTER — ANESTHESIA EVENT (OUTPATIENT)
Dept: SURGERY | Age: 68
End: 2023-09-17
Payer: MEDICARE

## 2023-09-18 ENCOUNTER — TELEPHONE (OUTPATIENT)
Dept: UROLOGY | Age: 68
End: 2023-09-18

## 2023-09-18 ENCOUNTER — HOSPITAL ENCOUNTER (OUTPATIENT)
Age: 68
Setting detail: OUTPATIENT SURGERY
Discharge: HOME | End: 2023-09-18
Attending: UROLOGY | Admitting: UROLOGY
Payer: MEDICARE

## 2023-09-18 ENCOUNTER — ANESTHESIA (OUTPATIENT)
Dept: SURGERY | Age: 68
End: 2023-09-18
Payer: MEDICARE

## 2023-09-18 VITALS
OXYGEN SATURATION: 96 % | DIASTOLIC BLOOD PRESSURE: 65 MMHG | SYSTOLIC BLOOD PRESSURE: 150 MMHG | HEART RATE: 96 BPM | HEIGHT: 60 IN | WEIGHT: 126.4 LBS | BODY MASS INDEX: 24.81 KG/M2 | RESPIRATION RATE: 16 BRPM | TEMPERATURE: 96.6 F

## 2023-09-18 DIAGNOSIS — E11.21 TYPE 2 DIABETES WITH NEPHROPATHY (HCC): Primary | ICD-10-CM

## 2023-09-18 DIAGNOSIS — N20.0 RIGHT NEPHROLITHIASIS: ICD-10-CM

## 2023-09-18 DIAGNOSIS — N20.0 KIDNEY STONE: ICD-10-CM

## 2023-09-18 DIAGNOSIS — R80.9 MODERATELY INCREASED ALBUMINURIA: ICD-10-CM

## 2023-09-18 LAB
GLUCOSE BLD STRIP.AUTO-MCNC: 75 MG/DL (ref 65–100)
SERVICE CMNT-IMP: NORMAL

## 2023-09-18 PROCEDURE — 2580000003 HC RX 258: Performed by: ANESTHESIOLOGY

## 2023-09-18 PROCEDURE — 82962 GLUCOSE BLOOD TEST: CPT

## 2023-09-18 RX ORDER — ONDANSETRON 2 MG/ML
4 INJECTION INTRAMUSCULAR; INTRAVENOUS
Status: CANCELLED | OUTPATIENT
Start: 2023-09-18 | End: 2023-09-19

## 2023-09-18 RX ORDER — FENTANYL CITRATE 50 UG/ML
50 INJECTION, SOLUTION INTRAMUSCULAR; INTRAVENOUS EVERY 5 MIN PRN
Status: CANCELLED | OUTPATIENT
Start: 2023-09-18

## 2023-09-18 RX ORDER — OXYCODONE HYDROCHLORIDE 5 MG/1
5 TABLET ORAL
Status: CANCELLED | OUTPATIENT
Start: 2023-09-18 | End: 2023-09-19

## 2023-09-18 RX ORDER — SODIUM CHLORIDE 0.9 % (FLUSH) 0.9 %
5-40 SYRINGE (ML) INJECTION EVERY 12 HOURS SCHEDULED
Status: DISCONTINUED | OUTPATIENT
Start: 2023-09-18 | End: 2023-09-18 | Stop reason: HOSPADM

## 2023-09-18 RX ORDER — SODIUM CHLORIDE 0.9 % (FLUSH) 0.9 %
5-40 SYRINGE (ML) INJECTION PRN
Status: CANCELLED | OUTPATIENT
Start: 2023-09-18

## 2023-09-18 RX ORDER — SODIUM CHLORIDE 0.9 % (FLUSH) 0.9 %
5-40 SYRINGE (ML) INJECTION EVERY 12 HOURS SCHEDULED
Status: CANCELLED | OUTPATIENT
Start: 2023-09-18

## 2023-09-18 RX ORDER — MIDAZOLAM HYDROCHLORIDE 2 MG/2ML
2 INJECTION, SOLUTION INTRAMUSCULAR; INTRAVENOUS
Status: DISCONTINUED | OUTPATIENT
Start: 2023-09-18 | End: 2023-09-18 | Stop reason: HOSPADM

## 2023-09-18 RX ORDER — SODIUM CHLORIDE 0.9 % (FLUSH) 0.9 %
5-40 SYRINGE (ML) INJECTION PRN
Status: DISCONTINUED | OUTPATIENT
Start: 2023-09-18 | End: 2023-09-18 | Stop reason: HOSPADM

## 2023-09-18 RX ORDER — IPRATROPIUM BROMIDE AND ALBUTEROL SULFATE 2.5; .5 MG/3ML; MG/3ML
1 SOLUTION RESPIRATORY (INHALATION)
Status: CANCELLED | OUTPATIENT
Start: 2023-09-18 | End: 2023-09-19

## 2023-09-18 RX ORDER — IPRATROPIUM BROMIDE AND ALBUTEROL SULFATE 2.5; .5 MG/3ML; MG/3ML
1 SOLUTION RESPIRATORY (INHALATION)
Status: DISCONTINUED | OUTPATIENT
Start: 2023-09-18 | End: 2023-09-18 | Stop reason: HOSPADM

## 2023-09-18 RX ORDER — HALOPERIDOL 5 MG/ML
1 INJECTION INTRAMUSCULAR
Status: CANCELLED | OUTPATIENT
Start: 2023-09-18 | End: 2023-09-19

## 2023-09-18 RX ORDER — HYDROMORPHONE HYDROCHLORIDE 2 MG/ML
0.5 INJECTION, SOLUTION INTRAMUSCULAR; INTRAVENOUS; SUBCUTANEOUS EVERY 10 MIN PRN
Status: CANCELLED | OUTPATIENT
Start: 2023-09-18

## 2023-09-18 RX ORDER — LIDOCAINE HYDROCHLORIDE 10 MG/ML
1 INJECTION, SOLUTION INFILTRATION; PERINEURAL
Status: DISCONTINUED | OUTPATIENT
Start: 2023-09-18 | End: 2023-09-18 | Stop reason: HOSPADM

## 2023-09-18 RX ORDER — SODIUM CHLORIDE, SODIUM LACTATE, POTASSIUM CHLORIDE, CALCIUM CHLORIDE 600; 310; 30; 20 MG/100ML; MG/100ML; MG/100ML; MG/100ML
INJECTION, SOLUTION INTRAVENOUS CONTINUOUS
Status: DISCONTINUED | OUTPATIENT
Start: 2023-09-18 | End: 2023-09-18 | Stop reason: HOSPADM

## 2023-09-18 RX ADMIN — SODIUM CHLORIDE, POTASSIUM CHLORIDE, SODIUM LACTATE AND CALCIUM CHLORIDE: 600; 310; 30; 20 INJECTION, SOLUTION INTRAVENOUS at 10:39

## 2023-09-18 ASSESSMENT — PAIN - FUNCTIONAL ASSESSMENT: PAIN_FUNCTIONAL_ASSESSMENT: 0-10

## 2023-09-18 ASSESSMENT — COPD QUESTIONNAIRES: CAT_SEVERITY: MODERATE

## 2023-09-18 ASSESSMENT — LIFESTYLE VARIABLES: SMOKING_STATUS: 1

## 2023-09-18 NOTE — TELEPHONE ENCOUNTER
Per Dr Maren Donaldson patient cancelled [facility] for today call to reschedule

## 2023-09-18 NOTE — ANESTHESIA PRE PROCEDURE
COVID-19 Screening (If Applicable): No results found for: \"COVID19\"        Anesthesia Evaluation  Patient summary reviewed and Nursing notes reviewed  Airway: Mallampati: II  TM distance: >3 FB   Neck ROM: full  Mouth opening: > = 3 FB   Dental:    (+) lower dentures      Pulmonary:   (+) COPD: moderate,  decreased breath sounds: bilateral current smoker          Patient smoked on day of surgery. Cardiovascular:  Exercise tolerance: good (>4 METS),   (+) hypertension: mild, CAD (No issues per pt): non-obstructive,       ECG reviewed  Rhythm: regular  Rate: normal                 ROS comment: About 4 METS  Denies CP  Chronic SOB     Neuro/Psych:   (+) psychiatric history: stable with treatmentdepression/anxiety             GI/Hepatic/Renal:   (+) GERD: well controlled,           Endo/Other:    (+) DiabetesType II DM, well controlled, , : arthritis: OA., .                 Abdominal:             Vascular:   + PVD, aortic or cerebral, . Other Findings:           Anesthesia Plan      general     ASA 3     (Plan for LMA. No GLP 1 in 2 weeks. Clears for 24 hours. )  Induction: intravenous. MIPS: Prophylactic antiemetics administered. Anesthetic plan and risks discussed with patient. Osiris Savage.  MD Will   9/18/2023

## 2023-09-19 ENCOUNTER — PATIENT MESSAGE (OUTPATIENT)
Dept: FAMILY MEDICINE CLINIC | Facility: CLINIC | Age: 68
End: 2023-09-19

## 2023-09-19 DIAGNOSIS — M25.552 LEFT HIP PAIN: ICD-10-CM

## 2023-09-19 DIAGNOSIS — N20.0 RIGHT NEPHROLITHIASIS: ICD-10-CM

## 2023-09-19 RX ORDER — LANOLIN ALCOHOL/MO/W.PET/CERES
400 CREAM (GRAM) TOPICAL DAILY
COMMUNITY

## 2023-09-19 RX ORDER — MORPHINE SULFATE 15 MG/1
15 TABLET ORAL 3 TIMES DAILY PRN
Qty: 90 TABLET | Refills: 0 | Status: SHIPPED | OUTPATIENT
Start: 2023-09-19 | End: 2023-10-19

## 2023-09-19 RX ORDER — OMEGA-3S/DHA/EPA/FISH OIL/D3 300MG-1000
400 CAPSULE ORAL DAILY
COMMUNITY

## 2023-09-19 NOTE — TELEPHONE ENCOUNTER
Prescription(s) refilled  It (they) has been escribed to the pharmacy. Requested Prescriptions     Signed Prescriptions Disp Refills    morphine (MSIR) 15 MG tablet 90 tablet 0     Sig: Take 1 tablet by mouth 3 times daily as needed for Pain for up to 30 days. Max Daily Amount: 45 mg     Authorizing Provider: TAB CALL     No orders of the defined types were placed in this encounter. ICD-10-CM    1. Right nephrolithiasis  N20.0 morphine (MSIR) 15 MG tablet      2.  Left hip pain  M25.552 morphine (MSIR) 15 MG tablet

## 2023-09-19 NOTE — TELEPHONE ENCOUNTER
From: Orlando Rodriguez  To: Dr. Alas [place]: 9/19/2023 11:54 AM EDT  Subject: Morphine refill     What is the status for the morphine patch? The surgery for my kidney stone was cancelled due to the machine that was needed breaking down. If we can't get the pain patches figured out could we do something else for the pain until the surgery gets rescheduled?     Thanks,  Chance Salazar

## 2023-09-19 NOTE — PROGRESS NOTES
PLEASE CONTINUE TAKING ALL PRESCRIPTION MEDICATIONS UP TO THE DAY OF SURGERY UNLESS OTHERWISE DIRECTED BELOW. DISCONTINUE all vitamins and supplements 7 days prior to surgery. DISCONTINUE Non-Steriodal Anti-Inflammatory (NSAIDS) such as Advil and Aleve 5 days prior to surgery. Home Medications to take  the day of surgery    Amlodipine, protonix, carafate if needed morphine bring albuterol inhaler-- take 38 units tresiba dos bring albuterol inhaler dos           Home Medications   to Hold   Pt stopped juan m 9/8/23        Comments                Please do not bring home medications with you on the day of surgery unless otherwise directed by your nurse. If you are instructed to bring home medications, please give them to your nurse as they will be administered by the nursing staff. If you have any questions, please call [address] [place] [address] [place] [phone]. A copy of this note was provided to the patient for reference.

## 2023-09-19 NOTE — PROGRESS NOTES
Patient verified name and     Order for consent WAS found in EHR and matches case posting; patient verified. Type 1B surgery, PHONE assessment complete. Labs per surgeon: NONE  Labs per anesthesia protocol: SQBS DOS--PT HAS LABS IN Epic DATED 23-- OK FOR SURG  EKG: IN [facility] AS NEEDED  OF NOTE PT HAD PHONE ASSESS FOR SAME SURG 23-- SURG WAS POSTPONED DUE TO LITHO MACHINE WAS DOWN. Hospital approved surgical skin cleanser and instructions given per hospital policy. Patient provided with and instructed on educational handouts including Guide to Surgery, Pain Management, Hand Hygiene, Blood Transfusion Education, and [place] [facility] Brochure. Patient answered medical/surgical history questions at their best of ability. All prior to admission medications documented in [facility]. Original medication prescription bottle WAS NOT visualized during patient appointment. Patient instructed to hold all vitamins 7 days prior to surgery and NSAIDS 5 days prior to surgery, patient verbalized understanding. Patient teach back successful and patient demonstrates knowledge of instructions.

## 2023-09-19 NOTE — PROGRESS NOTES
Orders Placed This Encounter   Procedures    [address] Nephrology     Referral Priority:   Routine     Referral Type:   Eval and Treat     Referral Reason:   Specialty Services Required     Referral Location:   [address] [place] [address] Nephrology     Requested Specialty:   Nephrology     Number of Visits Requested:   1

## 2023-09-22 ENCOUNTER — ANESTHESIA (OUTPATIENT)
Dept: SURGERY | Age: 68
End: 2023-09-22
Payer: MEDICARE

## 2023-09-22 ENCOUNTER — ANESTHESIA EVENT (OUTPATIENT)
Dept: SURGERY | Age: 68
End: 2023-09-22
Payer: MEDICARE

## 2023-09-22 ENCOUNTER — HOSPITAL ENCOUNTER (OUTPATIENT)
Age: 68
Setting detail: OUTPATIENT SURGERY
Discharge: HOME | End: 2023-09-22
Attending: UROLOGY | Admitting: UROLOGY
Payer: MEDICARE

## 2023-09-22 VITALS
OXYGEN SATURATION: 93 % | DIASTOLIC BLOOD PRESSURE: 70 MMHG | TEMPERATURE: 98.2 F | HEIGHT: 60 IN | WEIGHT: 127.6 LBS | SYSTOLIC BLOOD PRESSURE: 125 MMHG | BODY MASS INDEX: 25.05 KG/M2 | RESPIRATION RATE: 16 BRPM | HEART RATE: 84 BPM

## 2023-09-22 LAB
GLUCOSE BLD STRIP.AUTO-MCNC: 95 MG/DL (ref 65–100)
SERVICE CMNT-IMP: NORMAL

## 2023-09-22 PROCEDURE — 7100000001 HC PACU RECOVERY - ADDTL 15 MIN: Performed by: UROLOGY

## 2023-09-22 PROCEDURE — 6360000002 HC RX W HCPCS: Performed by: REGISTERED NURSE

## 2023-09-22 PROCEDURE — 7100000010 HC PHASE II RECOVERY - FIRST 15 MIN: Performed by: UROLOGY

## 2023-09-22 PROCEDURE — 82962 GLUCOSE BLOOD TEST: CPT

## 2023-09-22 PROCEDURE — 2709999900 HC NON-CHARGEABLE SUPPLY: Performed by: UROLOGY

## 2023-09-22 PROCEDURE — 6360000002 HC RX W HCPCS: Performed by: UROLOGY

## 2023-09-22 PROCEDURE — 50590 FRAGMENTING OF KIDNEY STONE: CPT | Performed by: UROLOGY

## 2023-09-22 PROCEDURE — 7100000000 HC PACU RECOVERY - FIRST 15 MIN: Performed by: UROLOGY

## 2023-09-22 PROCEDURE — 3600000004 HC SURGERY LEVEL 4 BASE: Performed by: UROLOGY

## 2023-09-22 PROCEDURE — 3600000014 HC SURGERY LEVEL 4 ADDTL 15MIN: Performed by: UROLOGY

## 2023-09-22 PROCEDURE — 2580000003 HC RX 258: Performed by: ANESTHESIOLOGY

## 2023-09-22 PROCEDURE — 3700000000 HC ANESTHESIA ATTENDED CARE: Performed by: UROLOGY

## 2023-09-22 PROCEDURE — 2580000003 HC RX 258: Performed by: REGISTERED NURSE

## 2023-09-22 PROCEDURE — 2500000003 HC RX 250 WO HCPCS: Performed by: REGISTERED NURSE

## 2023-09-22 PROCEDURE — 3700000001 HC ADD 15 MINUTES (ANESTHESIA): Performed by: UROLOGY

## 2023-09-22 RX ORDER — MIDAZOLAM HYDROCHLORIDE 2 MG/2ML
2 INJECTION, SOLUTION INTRAMUSCULAR; INTRAVENOUS
Status: DISCONTINUED | OUTPATIENT
Start: 2023-09-22 | End: 2023-09-22 | Stop reason: HOSPADM

## 2023-09-22 RX ORDER — OXYCODONE HYDROCHLORIDE 5 MG/1
5 TABLET ORAL
Status: DISCONTINUED | OUTPATIENT
Start: 2023-09-22 | End: 2023-09-22 | Stop reason: HOSPADM

## 2023-09-22 RX ORDER — FENTANYL CITRATE 50 UG/ML
100 INJECTION, SOLUTION INTRAMUSCULAR; INTRAVENOUS PRN
Status: DISCONTINUED | OUTPATIENT
Start: 2023-09-22 | End: 2023-09-22 | Stop reason: HOSPADM

## 2023-09-22 RX ORDER — EPHEDRINE SULFATE/0.9% NACL/PF 50 MG/5 ML
SYRINGE (ML) INTRAVENOUS PRN
Status: DISCONTINUED | OUTPATIENT
Start: 2023-09-22 | End: 2023-09-22 | Stop reason: SDUPTHER

## 2023-09-22 RX ORDER — ONDANSETRON 2 MG/ML
4 INJECTION INTRAMUSCULAR; INTRAVENOUS
Status: DISCONTINUED | OUTPATIENT
Start: 2023-09-22 | End: 2023-09-22 | Stop reason: HOSPADM

## 2023-09-22 RX ORDER — LIDOCAINE HYDROCHLORIDE 10 MG/ML
1 INJECTION, SOLUTION INFILTRATION; PERINEURAL
Status: DISCONTINUED | OUTPATIENT
Start: 2023-09-22 | End: 2023-09-22 | Stop reason: HOSPADM

## 2023-09-22 RX ORDER — PROPOFOL 10 MG/ML
INJECTION, EMULSION INTRAVENOUS PRN
Status: DISCONTINUED | OUTPATIENT
Start: 2023-09-22 | End: 2023-09-22 | Stop reason: SDUPTHER

## 2023-09-22 RX ORDER — FENTANYL CITRATE 50 UG/ML
50 INJECTION, SOLUTION INTRAMUSCULAR; INTRAVENOUS PRN
Status: DISCONTINUED | OUTPATIENT
Start: 2023-09-22 | End: 2023-09-22 | Stop reason: HOSPADM

## 2023-09-22 RX ORDER — HYDROMORPHONE HYDROCHLORIDE 2 MG/ML
0.5 INJECTION, SOLUTION INTRAMUSCULAR; INTRAVENOUS; SUBCUTANEOUS EVERY 5 MIN PRN
Status: DISCONTINUED | OUTPATIENT
Start: 2023-09-22 | End: 2023-09-22 | Stop reason: HOSPADM

## 2023-09-22 RX ORDER — SODIUM CHLORIDE, SODIUM LACTATE, POTASSIUM CHLORIDE, CALCIUM CHLORIDE 600; 310; 30; 20 MG/100ML; MG/100ML; MG/100ML; MG/100ML
INJECTION, SOLUTION INTRAVENOUS CONTINUOUS
Status: DISCONTINUED | OUTPATIENT
Start: 2023-09-22 | End: 2023-09-22 | Stop reason: HOSPADM

## 2023-09-22 RX ORDER — LIDOCAINE HYDROCHLORIDE 20 MG/ML
INJECTION, SOLUTION EPIDURAL; INFILTRATION; INTRACAUDAL; PERINEURAL PRN
Status: DISCONTINUED | OUTPATIENT
Start: 2023-09-22 | End: 2023-09-22 | Stop reason: SDUPTHER

## 2023-09-22 RX ADMIN — Medication 10 MG: at 11:26

## 2023-09-22 RX ADMIN — PHENYLEPHRINE HYDROCHLORIDE 200 MCG: 10 INJECTION INTRAVENOUS at 11:35

## 2023-09-22 RX ADMIN — SODIUM CHLORIDE, POTASSIUM CHLORIDE, SODIUM LACTATE AND CALCIUM CHLORIDE: 600; 310; 30; 20 INJECTION, SOLUTION INTRAVENOUS at 09:30

## 2023-09-22 RX ADMIN — Medication 10 MG: at 11:21

## 2023-09-22 RX ADMIN — PHENYLEPHRINE HYDROCHLORIDE 100 MCG: 10 INJECTION INTRAVENOUS at 11:20

## 2023-09-22 RX ADMIN — PROPOFOL 200 MG: 10 INJECTION, EMULSION INTRAVENOUS at 11:14

## 2023-09-22 RX ADMIN — Medication 2000 MG: at 11:18

## 2023-09-22 RX ADMIN — Medication 20 MG: at 11:19

## 2023-09-22 RX ADMIN — PHENYLEPHRINE HYDROCHLORIDE 200 MCG: 10 INJECTION INTRAVENOUS at 11:28

## 2023-09-22 RX ADMIN — Medication 15 MG: at 11:35

## 2023-09-22 RX ADMIN — LIDOCAINE HYDROCHLORIDE 100 MG: 20 INJECTION, SOLUTION EPIDURAL; INFILTRATION; INTRACAUDAL; PERINEURAL at 11:14

## 2023-09-22 ASSESSMENT — PAIN - FUNCTIONAL ASSESSMENT: PAIN_FUNCTIONAL_ASSESSMENT: 0-10

## 2023-09-22 ASSESSMENT — LIFESTYLE VARIABLES: SMOKING_STATUS: 1

## 2023-09-22 ASSESSMENT — COPD QUESTIONNAIRES: CAT_SEVERITY: MODERATE

## 2023-09-22 NOTE — ANESTHESIA POSTPROCEDURE EVALUATION
Department of Anesthesiology  Postprocedure Note    Patient: Cherylene Roes  MRN: [id number]  YOB: 1955  Date of evaluation: 9/22/2023      Procedure Summary     Date: 09/22/23 Room / Location: [facility] MAIN OR  / [facility] MAIN OR    Anesthesia Start: 1103 Anesthesia Stop: 3518    Procedure: ESWL EXTRACORPOREAL SHOCK WAVE LITHOTRIPSY/RIGHT (Right: Flank) Diagnosis:       Kidney stone      (Kidney stone [N20.0])    Providers: Maren Pritchard DO Responsible Provider: Dipesh Way MD    Anesthesia Type: general ASA Status: 3          Anesthesia Type: No value filed.     Marysol Phase I: Marysol Score: 10    Marysol Phase II: Marysol Score: 10      Anesthesia Post Evaluation    Patient location during evaluation: PACU  Patient participation: complete - patient participated  Level of consciousness: awake  Pain score: 0  Airway patency: patent  Nausea & Vomiting: no nausea and no vomiting  Complications: no  Cardiovascular status: blood pressure returned to baseline and hemodynamically stable  Respiratory status: acceptable, spontaneous ventilation and nonlabored ventilation  Hydration status: euvolemic  Multimodal analgesia pain management approach  Pain management: adequate

## 2023-09-22 NOTE — ANESTHESIA PRE PROCEDURE
Department of Anesthesiology  Preprocedure Note       Name:  Magi Graves   Age:  70 y.o.  :  1955                                          MRN:  [id number]         Date:  2023      Surgeon: Christy Haddad):  Jennifer Bains DO    Procedure: Procedure(s):  ESWL EXTRACORPOREAL SHOCK WAVE LITHOTRIPSY/RIGHT    Medications prior to admission:   Prior to Admission medications    Medication Sig Start Date End Date Taking? Authorizing Provider   morphine (MSIR) 15 MG tablet Take 1 tablet by mouth 3 times daily as needed for Pain for up to 30 days. Max Daily Amount: 45 mg  Patient taking differently: Take 1 tablet by mouth 3 times daily as needed for Pain. Usually takes at least at hs 9/19/23 10/19/23  Davy Jones MD   magnesium oxide (MAG-OX) 400 (240 Mg) MG tablet Take 1 tablet by mouth daily    Historical Provider, MD   vitamin D3 (CHOLECALCIFEROL) 10 MCG (400 UNIT) TABS tablet Take 1 tablet by mouth daily    Historical Provider, MD   olmesartan (BENICAR) 40 MG tablet Take 1 tablet by mouth daily    Historical Provider, MD   Risedronate Sodium 150 MG TABS 1 po q month first thing in the morning with full glass of water 30 min prior to other food or drink  Patient taking differently: every 30 days 1 po q month first thing in the morning with full glass of water 30 min prior to other food or drink 23   Davy Jones MD   lidocaine (LIDODERM) 5 % Place 1 -3 patches onto the skin daily 12 hours on, 12 hours off.   Patient not taking: Reported on 2023   Davy Jones MD   JARDIANCE 10 MG tablet 1 tablet daily 23   Historical Provider, MD   cyclobenzaprine (FLEXERIL) 10 MG tablet 3 times daily as needed 8/15/23   Historical Provider, MD   sucralfate (CARAFATE) 1 GM tablet Take 1 tablet by mouth 4 times daily 23   Davy Jones MD   Insulin Degludec (TRESIBA FLEXTOUCH) 200 UNIT/ML SOPN INJECT 50 UNITS UNDER THE SKIN DAILY  Patient taking differently: 48 Units INJECT 50 UNITS UNDER THE SKIN

## 2023-09-22 NOTE — BRIEF OP NOTE
Brief Postoperative Note      Patient: Kaylyn Tiwari  YOB: 1955  MRN: [id number]    Date of Procedure: 9/22/2023    Pre-Op Diagnosis Codes:     * Kidney stone [N20.0]    Post-Op Diagnosis: R renal stone       Procedure(s):  ESWL EXTRACORPOREAL SHOCK WAVE LITHOTRIPSY/RIGHT    Surgeon(s):  Otoniel Casillas DO    Assistant:  * No surgical staff found *    Anesthesia: General    Estimated Blood Loss (mL): Nil    Complications: none immediate    Specimens:   * No specimens in log *    Implants:  * No implants in log *      Drains: * No LDAs found *    Findings: see op note    Electronically signed by Otoniel Casillas DO on 9/22/2023 at 12:01 PM

## 2023-09-22 NOTE — DISCHARGE INSTRUCTIONS
ACTIVITY  As tolerated and as directed by your doctor. Bathe or shower as directed by your doctor. DIET  Clear liquids until no nausea or vomiting; then light diet for the first day. Advance to regular diet on second day, unless your doctor orders otherwise. If nausea and vomiting continues, call your doctor. PAIN  Take pain medication as directed by your doctor. Call your doctor if pain is NOT relieved by medication. DO NOT take aspirin of blood thinners unless directed by your doctor. MEDICATION INTERACTION:During your procedure you potentially received a medication or medications which may reduce the effectiveness of oral contraceptives. Please consider other forms of contraception for 1 month following your procedure if you are currently using oral contraceptives as your primary form of birth control. In addition to this, we recommend continuing your oral contraceptive as prescribed, unless otherwise instructed by your physician, during this time      [address] [facility] [address] IF   Excessive bleeding that does not stop after holding pressure over the area  Temperature of 101 degrees F or above  Excessive redness, swelling or bruising, and/ or green or yellow, smelly discharge from incision    After general anesthesia or intravenous sedation, for 24 hours or while taking prescription Narcotics:  Limit your activities  A responsible adult needs to be with you for the next 24 hours  Do not drive and operate hazardous machinery  Do not make important personal or business decisions  Do not drink alcoholic beverages  If you have not urinated within 8 hours after discharge, and you are experiencing discomfort from urinary retention, please go to the nearest ED. If you have sleep apnea and have a CPAP machine, please use it for all naps and sleeping. Please use caution when taking narcotics and any of your home medications that may cause drowsiness.   *  Please give a list of your current medications to procedure you potentially received a medication or medications which may reduce the effectiveness of oral contraceptives. Please consider other forms of contraception for 1 month following your procedure if you are currently using oral contraceptives as your primary form of birth control. In addition to this, we recommend continuing your oral contraceptive as prescribed, unless otherwise instructed by your physician, during this time. CALL YOUR DOCTOR IF   Expect blood-tinged urine. Call your doctor if it lasts more than 72 hours or if you cannot see through the urine. Aches, chills, or fever of 101 degree F or above    Lithotripsy does not make your stone disappear. The treatment is meant to crush your stone so that the fragments can be passed. Strain your urine, save any fragments, and take them to your doctor. The fragments may not begin to pass for up to 1-2 months. You may experience slight bruising at the treated site. After general anesthesia or intravenous sedation, for 24 hours or while taking prescription Narcotics:  Limit your activities  A responsible adult needs to be with you for the next 24 hours  Do not drive and operate hazardous machinery  Do not make important personal or business decisions  Do not drink alcoholic beverages  If you have not urinated within 8 hours after discharge, and you are experiencing discomfort from urinary retention, please go to the nearest ED. If you have sleep apnea and have a CPAP machine, please use it for all naps and sleeping. Please use caution when taking narcotics and any of your home medications that may cause drowsiness. *  Please give a list of your current medications to your Primary Care Provider. *  Please update this list whenever your medications are discontinued, doses are      changed, or new medications (including over-the-counter products) are added.   *  Please carry medication information at all times in case of emergency

## 2023-09-22 NOTE — OP NOTE
[address] [facility]  OPERATIVE REPORT    Name:  Flakito Moran  MR#:  [id number]  :  1955  ACCOUNT #:  [de-identified]  DATE OF SERVICE:  2023    PREOPERATIVE DIAGNOSIS:  Right renal stone. POSTOPERATIVE DIAGNOSIS:  Right renal stone. PROCEDURE PERFORMED:  Right extracorporeal shockwave lithotripsy. SURGEON:  Jamal Bains DO    ASSISTANT:  None. ANESTHESIA:  General.    COMPLICATIONS:  None immediate. SPECIMENS REMOVED:  None. IMPLANTS:  None. ESTIMATED BLOOD LOSS:  None. CLINICAL HISTORY:  This is a 57-year-old female who recently presented with back pain. A CT scan on 08/15/2023 showed a 6-mm right renal stone. This was confirmed on plain imaging. All risks, benefits and alternatives to the above-mentioned procedure have been discussed and she is willing to proceed at this time. DESCRIPTION OF OPERATIVE PROCEDURE:  The patient's consent was obtained, and the patient was brought back to the operating room at which time she was placed in the supine position. The renal stone was visualized under fluoroscopy after the uneventful induction of general anesthesia, the stone was then localized under the X, Y and Z axes. A total of 3000 shocks were administered to the stone. The initial 400 shocks were administered at a rate of 60 shocks per minute. The subsequent 200 shocks were administered at a rate of 90 shocks per minute. The patient did develop PVCs during the case. Therefore, the remaining of the 2400 shocks were administered at a rate between 60 to 70 shocks per minute. The stone did appear to fragment nicely throughout the case. The patient tolerated the procedure well. She will follow up with the nurse practitioner in 2 weeks for a postoperative KUB.       [address] [facility],       [id number]  D:  2023 12:07  T:  2023 15:01  JOB #:  [id number]

## 2023-09-22 NOTE — OR NURSING
Preoperative flank skin condition: clear  Lead shield: Yes  Patient ear protection: Yes   Gel applied to patient's flank and Lithotripsy head: Yes   Starting power level: 7    Shock start time (first  fluoroscopy):1121  Shock stop time (last lithotripsy shock):1211  Ending power level:11  Total shocks:3000  Total fluoroscopy time: 1.06 sec  Postoperative flank skin condition: pink

## 2023-09-29 ENCOUNTER — OFFICE VISIT (OUTPATIENT)
Dept: FAMILY MEDICINE CLINIC | Facility: CLINIC | Age: 68
End: 2023-09-29
Payer: MEDICARE

## 2023-09-29 VITALS
OXYGEN SATURATION: 98 % | WEIGHT: 124 LBS | HEIGHT: 60 IN | TEMPERATURE: 97.2 F | SYSTOLIC BLOOD PRESSURE: 151 MMHG | HEART RATE: 98 BPM | DIASTOLIC BLOOD PRESSURE: 71 MMHG | BODY MASS INDEX: 24.35 KG/M2 | RESPIRATION RATE: 16 BRPM

## 2023-09-29 DIAGNOSIS — F51.01 PRIMARY INSOMNIA: ICD-10-CM

## 2023-09-29 DIAGNOSIS — I10 PRIMARY HYPERTENSION: ICD-10-CM

## 2023-09-29 DIAGNOSIS — R59.0 PREAURICULAR LYMPHADENOPATHY: ICD-10-CM

## 2023-09-29 DIAGNOSIS — E11.21 TYPE 2 DIABETES WITH NEPHROPATHY (HCC): Primary | ICD-10-CM

## 2023-09-29 DIAGNOSIS — D72.9 NEUTROPHILIA: ICD-10-CM

## 2023-09-29 DIAGNOSIS — I77.9 PERIPHERAL ARTERY OCCLUSION (HCC): ICD-10-CM

## 2023-09-29 DIAGNOSIS — H74.91 MASTOID DISORDER, RIGHT: ICD-10-CM

## 2023-09-29 DIAGNOSIS — R80.9 MODERATELY INCREASED ALBUMINURIA: ICD-10-CM

## 2023-09-29 DIAGNOSIS — B37.31 CANDIDAL VAGINITIS: ICD-10-CM

## 2023-09-29 PROCEDURE — 1123F ACP DISCUSS/DSCN MKR DOCD: CPT | Performed by: FAMILY MEDICINE

## 2023-09-29 PROCEDURE — 3074F SYST BP LT 130 MM HG: CPT | Performed by: FAMILY MEDICINE

## 2023-09-29 PROCEDURE — 1090F PRES/ABSN URINE INCON ASSESS: CPT | Performed by: FAMILY MEDICINE

## 2023-09-29 PROCEDURE — 3017F COLORECTAL CA SCREEN DOC REV: CPT | Performed by: FAMILY MEDICINE

## 2023-09-29 PROCEDURE — 4004F PT TOBACCO SCREEN RCVD TLK: CPT | Performed by: FAMILY MEDICINE

## 2023-09-29 PROCEDURE — 3078F DIAST BP <80 MM HG: CPT | Performed by: FAMILY MEDICINE

## 2023-09-29 PROCEDURE — G8420 CALC BMI NORM PARAMETERS: HCPCS | Performed by: FAMILY MEDICINE

## 2023-09-29 PROCEDURE — 99214 OFFICE O/P EST MOD 30 MIN: CPT | Performed by: FAMILY MEDICINE

## 2023-09-29 PROCEDURE — G8399 PT W/DXA RESULTS DOCUMENT: HCPCS | Performed by: FAMILY MEDICINE

## 2023-09-29 PROCEDURE — G8427 DOCREV CUR MEDS BY ELIG CLIN: HCPCS | Performed by: FAMILY MEDICINE

## 2023-09-29 PROCEDURE — 3044F HG A1C LEVEL LT 7.0%: CPT | Performed by: FAMILY MEDICINE

## 2023-09-29 PROCEDURE — 2022F DILAT RTA XM EVC RTNOPTHY: CPT | Performed by: FAMILY MEDICINE

## 2023-09-29 RX ORDER — AMLODIPINE BESYLATE 5 MG/1
TABLET ORAL
Qty: 90 TABLET | Refills: 1 | Status: SHIPPED | OUTPATIENT
Start: 2023-09-29

## 2023-09-29 RX ORDER — TRAZODONE HYDROCHLORIDE 100 MG/1
TABLET ORAL
Qty: 90 TABLET | Refills: 5 | Status: SHIPPED | OUTPATIENT
Start: 2023-09-29

## 2023-09-29 RX ORDER — INSULIN DEGLUDEC 200 U/ML
INJECTION, SOLUTION SUBCUTANEOUS
Qty: 18 ML | Refills: 5 | Status: SHIPPED | OUTPATIENT
Start: 2023-09-29

## 2023-09-29 RX ORDER — METOPROLOL SUCCINATE 50 MG/1
TABLET, EXTENDED RELEASE ORAL
Qty: 90 TABLET | Refills: 1 | Status: SHIPPED | OUTPATIENT
Start: 2023-09-29

## 2023-09-29 RX ORDER — AMOXICILLIN 875 MG/1
875 TABLET, COATED ORAL 2 TIMES DAILY
Qty: 20 TABLET | Refills: 0 | Status: SHIPPED | OUTPATIENT
Start: 2023-09-29 | End: 2023-10-09

## 2023-09-29 RX ORDER — FLUCONAZOLE 150 MG/1
TABLET ORAL
Qty: 3 TABLET | Refills: 3 | Status: SHIPPED | OUTPATIENT
Start: 2023-09-29

## 2023-09-29 ASSESSMENT — ENCOUNTER SYMPTOMS
COUGH: 0
VISUAL CHANGE: 0
NAUSEA: 0
ABDOMINAL PAIN: 0
SWOLLEN GLANDS: 0
SORE THROAT: 0
VOMITING: 0
CHANGE IN BOWEL HABIT: 0

## 2023-09-29 NOTE — PROGRESS NOTES
HISTORY OF PRESENT ILLNESS  Chief Complaint   Patient presents with    Mass     Pt states that she has lump outside of her right ear on her face that is making her hearing aid not fit     Pt states that she has lump outside of her right ear on her face that is making her hearing aid not fit. It bled a couple of times from a scratch. Felt like she had a pimple and noticed that the right one didn't fit. Right  Feels it going down into the mastoid area. David Youngblood and Ferny Kovacs have moved in and after a week no longer nice to her. Had to call the police out. Stays nervous all the time. Took apart her computer. Was trying to split up the bills. Loosing weight they keep her kitchen in a mess. They want to put her into a home. They took her gun. Had blood in the urine after her lithotripsy and that resolved. Out of mounjaro for 3 weeks  Down to 38 units of tresiba. White count was up at the time of the surgery. Diabetes Mellitus: Patient presents for follow up of diabetes. Symptoms: taking medications as instructed, no medication side effects noted, no TIA's, no chest pain on exertion, no dyspnea on exertion, no swelling of ankles. Symptoms have reasonably well controlled. Patient denies chest pain. She states she is compliant most of the time with her  medications. She  states she is compliant most of the time with her  diet. Evaluation to date has been included below. Home sugars: BGs consistently in an acceptable range. Treatment to date: medications. Diabetic issues reviewed with her: low cholesterol diet, weight control and daily exercise discussed.   Key Antihyperglycemic Medications            Insulin Degludec (TRESIBA FLEXTOUCH) 200 UNIT/ML SOPN (Taking)    Sig: INJECT 50 UNITS UNDER THE SKIN DAILY    JARDIANCE 10 MG tablet (Taking)    Class: Historical Med    Tirzepatide (MOUNJARO) 5 MG/0.5ML SOPN SC injection (Taking)    Sig: Inject 0.5 mLs into the skin once a week after

## 2023-10-03 ENCOUNTER — TELEPHONE (OUTPATIENT)
Dept: FAMILY MEDICINE CLINIC | Facility: CLINIC | Age: 68
End: 2023-10-03

## 2023-10-03 NOTE — TELEPHONE ENCOUNTER
Pt calling stating her Blood Sugar levels still running low. Pt states at last visit her and Dr. Herman Yu discussed about changing dosage on her tresiba. Please call pt back with what she needs to do.

## 2023-10-03 NOTE — TELEPHONE ENCOUNTER
Looks like she was on 38 units daily so may want to decrease by 4 units to 34 and keep us posted on how doing.

## 2023-10-04 NOTE — TELEPHONE ENCOUNTER
Pt notified to decrease Tresiba to 34 units. To continue to monitor blood sugar readings. Pt voiced understanding.

## 2023-10-09 ENCOUNTER — PATIENT MESSAGE (OUTPATIENT)
Dept: FAMILY MEDICINE CLINIC | Facility: CLINIC | Age: 68
End: 2023-10-09

## 2023-10-17 ASSESSMENT — ENCOUNTER SYMPTOMS
WHEEZING: 0
DIARRHEA: 0
CONSTIPATION: 0
RHINORRHEA: 0
SHORTNESS OF BREATH: 0

## 2023-10-20 ENCOUNTER — TELEPHONE (OUTPATIENT)
Dept: FAMILY MEDICINE CLINIC | Facility: CLINIC | Age: 68
End: 2023-10-20

## 2023-10-20 DIAGNOSIS — M25.552 LEFT HIP PAIN: Primary | ICD-10-CM

## 2023-10-23 ENCOUNTER — TELEPHONE (OUTPATIENT)
Dept: FAMILY MEDICINE CLINIC | Facility: CLINIC | Age: 68
End: 2023-10-23

## 2023-10-23 NOTE — TELEPHONE ENCOUNTER
Pt called stating she fell about ten minutes ago and hit the sidewalk. Pt's forehead about her eyebrow was cut open and she is still bleeding She is also worried about a concussion. Instructed her to go to the ER to get evaluated. Please advise.

## 2023-10-23 NOTE — TELEPHONE ENCOUNTER
Spoke to the patient, pt states she has not went to the ER but her head is hurting as well as her neck. Advised the patient she needs to seek emergent care.  Pt voiced understanding and stated she would be evaluated

## 2023-10-24 NOTE — TELEPHONE ENCOUNTER
Orders Placed This Encounter   Procedures    MRI HIP LEFT WO CONTRAST     Standing Status:   Future     Standing Expiration Date:   10/24/2024    External Referral To Orthopedic Surgery     Referral Priority:   Routine     Referral Type:   Eval and Treat     Referral Reason:   Specialty Services Required     Referred to Provider:   Melva Jones MD     Requested Specialty:   Orthopedic Surgery     Number of Visits Requested:   1

## 2023-10-26 ENCOUNTER — OFFICE VISIT (OUTPATIENT)
Dept: FAMILY MEDICINE CLINIC | Facility: CLINIC | Age: 68
End: 2023-10-26
Payer: MEDICARE

## 2023-10-26 VITALS
WEIGHT: 125 LBS | TEMPERATURE: 97.6 F | HEART RATE: 79 BPM | DIASTOLIC BLOOD PRESSURE: 68 MMHG | HEIGHT: 60 IN | SYSTOLIC BLOOD PRESSURE: 136 MMHG | BODY MASS INDEX: 24.54 KG/M2 | RESPIRATION RATE: 16 BRPM | OXYGEN SATURATION: 96 %

## 2023-10-26 DIAGNOSIS — M79.2 NEUROPATHIC PAIN: ICD-10-CM
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
## 2023-10-26 DIAGNOSIS — S09.90XD INJURY OF HEAD, SUBSEQUENT ENCOUNTER: ICD-10-CM

## 2023-10-26 PROCEDURE — 3078F DIAST BP <80 MM HG: CPT | Performed by: NURSE PRACTITIONER

## 2023-10-26 PROCEDURE — 99213 OFFICE O/P EST LOW 20 MIN: CPT | Performed by: NURSE PRACTITIONER

## 2023-10-26 PROCEDURE — 1123F ACP DISCUSS/DSCN MKR DOCD: CPT | Performed by: NURSE PRACTITIONER

## 2023-10-26 PROCEDURE — 3075F SYST BP GE 130 - 139MM HG: CPT | Performed by: NURSE PRACTITIONER

## 2023-10-26 RX ORDER — EMPAGLIFLOZIN 10 MG/1
TABLET, FILM COATED ORAL
Qty: 90 TABLET | Refills: 1 | Status: SHIPPED | OUTPATIENT
Start: 2023-10-26

## 2023-10-26 ASSESSMENT — ENCOUNTER SYMPTOMS
RESPIRATORY NEGATIVE: 1
GASTROINTESTINAL NEGATIVE: 1

## 2023-10-26 NOTE — PROGRESS NOTES
[address], [address] [place] [address] Road  Phone: [phone] Fax [phone]  Haris JIMÉNEZ [place], [facility]           Sharona Moran (: 1955) presents today  she had ER visit after a fall this past Monday morning. She was walking and the tip of her shoe grabbed on the sidewalk and she fell, hitting her head, and left hip. She went to ER and was treated and released. She did not have a CT of head and is now having new onset sporadic headache on right side above her eye. Her head hit on the left during her fall and she has a laceration to the left side of head. She has a scheduled MRI of left hip tomorrow, and has a orthopedic referral to . There has been no new onset of  dizziness, no change in vision, no change in gait or coordination.      Chief Complaint   Patient presents with    Neck Pain     Left side    Hip Pain     Left hip due     Head Injury     Due to fall  Has a gash and scrape on left side of forehead  Having headaches sporadically on right side      Knee Injury     Right  Due to fall    Fall     This past Monday, onto a side walk, pt states she momentarily forgot about foot drop in right foot and tripped over toes     Patient Active Problem List   Diagnosis    Back pain    Renal insufficiency    PUD (peptic ulcer disease)    Mixed hyperlipidemia    Neutrophilia    DM (diabetes mellitus) ([address])    Tremor    Melanocytic nevus of trunk    Falls frequently    Bacteremia    Hypokalemia    Coronary artery calcification    Major depressive disorder, recurrent episode, moderate (HCC)    Chronic pain    Hypertension    Osteoarthritis of both knees    Murmur, cardiac    At risk for allergic reaction to medication    Isolated proteinuria without specific morphologic lesion    Arthritis of left knee    History of colon cancer    GERD (gastroesophageal reflux disease)    MDD (major depressive disorder)    Scoliosis, congenital    Tobacco abuse

## 2023-10-27 ENCOUNTER — HOSPITAL ENCOUNTER (OUTPATIENT)
Dept: CT IMAGING | Age: 68
End: 2023-10-27
Payer: MEDICARE

## 2023-10-27 DIAGNOSIS — S09.90XD INJURY OF HEAD, SUBSEQUENT ENCOUNTER: ICD-10-CM
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
## 2023-10-27 PROCEDURE — 70450 CT HEAD/BRAIN W/O DYE: CPT

## 2023-10-31 ENCOUNTER — HOSPITAL ENCOUNTER (OUTPATIENT)
Dept: MRI IMAGING | Age: 68
Discharge: HOME | End: 2023-11-03
Attending: FAMILY MEDICINE
Payer: MEDICARE

## 2023-10-31 DIAGNOSIS — M25.552 LEFT HIP PAIN: ICD-10-CM

## 2023-10-31 PROCEDURE — 73721 MRI JNT OF LWR EXTRE W/O DYE: CPT

## 2023-11-07 ENCOUNTER — TELEPHONE (OUTPATIENT)
Dept: FAMILY MEDICINE CLINIC | Facility: CLINIC | Age: 68
End: 2023-11-07

## 2023-11-07 DIAGNOSIS — M25.552 LEFT HIP PAIN: Primary | ICD-10-CM

## 2023-11-07 DIAGNOSIS — M16.12 PRIMARY OSTEOARTHRITIS OF LEFT HIP: ICD-10-CM

## 2023-11-07 NOTE — TELEPHONE ENCOUNTER
Orders Placed This Encounter   Procedures    External Referral To Orthopedic Surgery     Referral Priority:   Routine     Referral Type:   Eval and Treat     Referral Reason:   Specialty Services Required     Referred to Provider:   Sumaya Royal MD     Requested Specialty:   Orthopedic Surgery     Number of Visits Requested:   1

## 2023-11-20 ASSESSMENT — ENCOUNTER SYMPTOMS
VISUAL CHANGE: 0
SHORTNESS OF BREATH: 0
BLURRED VISION: 0

## 2023-11-21 ENCOUNTER — OFFICE VISIT (OUTPATIENT)
Dept: FAMILY MEDICINE CLINIC | Facility: CLINIC | Age: 68
End: 2023-11-21
Payer: MEDICARE

## 2023-11-21 VITALS
BODY MASS INDEX: 23.16 KG/M2 | DIASTOLIC BLOOD PRESSURE: 75 MMHG | HEIGHT: 60 IN | HEART RATE: 76 BPM | SYSTOLIC BLOOD PRESSURE: 183 MMHG | TEMPERATURE: 97.8 F | RESPIRATION RATE: 16 BRPM | OXYGEN SATURATION: 99 % | WEIGHT: 118 LBS

## 2023-11-21 DIAGNOSIS — J44.9 CHRONIC OBSTRUCTIVE PULMONARY DISEASE, UNSPECIFIED COPD TYPE (HCC): ICD-10-CM

## 2023-11-21 DIAGNOSIS — E11.22 TYPE 2 DIABETES MELLITUS WITH CHRONIC KIDNEY DISEASE, WITH LONG-TERM CURRENT USE OF INSULIN, UNSPECIFIED CKD STAGE (HCC): ICD-10-CM

## 2023-11-21 DIAGNOSIS — F33.1 MAJOR DEPRESSIVE DISORDER, RECURRENT EPISODE, MODERATE (HCC): Primary | ICD-10-CM

## 2023-11-21 DIAGNOSIS — F39 MOOD DISORDER (HCC): ICD-10-CM

## 2023-11-21 DIAGNOSIS — N20.0 RIGHT NEPHROLITHIASIS: ICD-10-CM

## 2023-11-21 DIAGNOSIS — M25.552 LEFT HIP PAIN: ICD-10-CM

## 2023-11-21 DIAGNOSIS — Z79.4 TYPE 2 DIABETES MELLITUS WITH CHRONIC KIDNEY DISEASE, WITH LONG-TERM CURRENT USE OF INSULIN, UNSPECIFIED CKD STAGE (HCC): ICD-10-CM

## 2023-11-21 PROCEDURE — 4004F PT TOBACCO SCREEN RCVD TLK: CPT | Performed by: FAMILY MEDICINE

## 2023-11-21 PROCEDURE — 1090F PRES/ABSN URINE INCON ASSESS: CPT | Performed by: FAMILY MEDICINE

## 2023-11-21 PROCEDURE — 3017F COLORECTAL CA SCREEN DOC REV: CPT | Performed by: FAMILY MEDICINE

## 2023-11-21 PROCEDURE — G8427 DOCREV CUR MEDS BY ELIG CLIN: HCPCS | Performed by: FAMILY MEDICINE

## 2023-11-21 PROCEDURE — G8399 PT W/DXA RESULTS DOCUMENT: HCPCS | Performed by: FAMILY MEDICINE

## 2023-11-21 PROCEDURE — 1123F ACP DISCUSS/DSCN MKR DOCD: CPT | Performed by: FAMILY MEDICINE

## 2023-11-21 PROCEDURE — 3077F SYST BP >= 140 MM HG: CPT | Performed by: FAMILY MEDICINE

## 2023-11-21 PROCEDURE — 99214 OFFICE O/P EST MOD 30 MIN: CPT | Performed by: FAMILY MEDICINE

## 2023-11-21 PROCEDURE — G8420 CALC BMI NORM PARAMETERS: HCPCS | Performed by: FAMILY MEDICINE

## 2023-11-21 PROCEDURE — 2022F DILAT RTA XM EVC RTNOPTHY: CPT | Performed by: FAMILY MEDICINE

## 2023-11-21 PROCEDURE — 3078F DIAST BP <80 MM HG: CPT | Performed by: FAMILY MEDICINE

## 2023-11-21 PROCEDURE — 3023F SPIROM DOC REV: CPT | Performed by: FAMILY MEDICINE

## 2023-11-21 PROCEDURE — 3044F HG A1C LEVEL LT 7.0%: CPT | Performed by: FAMILY MEDICINE

## 2023-11-21 PROCEDURE — G8484 FLU IMMUNIZE NO ADMIN: HCPCS | Performed by: FAMILY MEDICINE

## 2023-11-21 RX ORDER — ESCITALOPRAM OXALATE 5 MG/1
TABLET ORAL
Qty: 30 TABLET | Refills: 5 | Status: SHIPPED | OUTPATIENT
Start: 2023-11-21

## 2023-11-21 RX ORDER — MORPHINE SULFATE 15 MG/1
15 TABLET ORAL 3 TIMES DAILY PRN
Qty: 90 TABLET | Refills: 0 | Status: SHIPPED | OUTPATIENT
Start: 2023-11-21 | End: 2023-12-21

## 2023-11-21 ASSESSMENT — ANXIETY QUESTIONNAIRES
4. TROUBLE RELAXING: 2
GAD7 TOTAL SCORE: 17
7. FEELING AFRAID AS IF SOMETHING AWFUL MIGHT HAPPEN: 1
5. BEING SO RESTLESS THAT IT IS HARD TO SIT STILL: 2
IF YOU CHECKED OFF ANY PROBLEMS ON THIS QUESTIONNAIRE, HOW DIFFICULT HAVE THESE PROBLEMS MADE IT FOR YOU TO DO YOUR WORK, TAKE CARE OF THINGS AT HOME, OR GET ALONG WITH OTHER PEOPLE: SOMEWHAT DIFFICULT
6. BECOMING EASILY ANNOYED OR IRRITABLE: 3
3. WORRYING TOO MUCH ABOUT DIFFERENT THINGS: 3
2. NOT BEING ABLE TO STOP OR CONTROL WORRYING: 3
1. FEELING NERVOUS, ANXIOUS, OR ON EDGE: 3

## 2023-11-21 ASSESSMENT — ENCOUNTER SYMPTOMS
DIARRHEA: 0
RHINORRHEA: 0
WHEEZING: 0
VOMITING: 0
COUGH: 0
CONSTIPATION: 0
NAUSEA: 0
ABDOMINAL PAIN: 0

## 2023-11-21 ASSESSMENT — COLUMBIA-SUICIDE SEVERITY RATING SCALE - C-SSRS
BASED ON RESPONSES TO C-SSRS QS 1-6, WHAT IS THE PATIENT'S OVERALL RISK RATING FOR SUICIDE: NO RISK
6. HAVE YOU EVER DONE ANYTHING, STARTED TO DO ANYTHING, OR PREPARED TO DO ANYTHING TO END YOUR LIFE?: NO
3. HAVE YOU BEEN THINKING ABOUT HOW YOU MIGHT KILL YOURSELF?: NO
4. HAVE YOU HAD THESE THOUGHTS AND HAD SOME INTENTION OF ACTING ON THEM?: NO
1. WITHIN THE PAST MONTH, HAVE YOU WISHED YOU WERE DEAD OR WISHED YOU COULD GO TO SLEEP AND NOT WAKE UP?: NO
5. HAVE YOU STARTED TO WORK OUT OR WORKED OUT THE DETAILS OF HOW TO KILL YOURSELF? DO YOU INTEND TO CARRY OUT THIS PLAN?: NO
7. DID THIS OCCUR IN THE LAST THREE MONTHS: NO
2. HAVE YOU ACTUALLY HAD ANY THOUGHTS OF KILLING YOURSELF?: NO

## 2023-11-21 ASSESSMENT — PATIENT HEALTH QUESTIONNAIRE - PHQ9
8. MOVING OR SPEAKING SO SLOWLY THAT OTHER PEOPLE COULD HAVE NOTICED. OR THE OPPOSITE, BEING SO FIGETY OR RESTLESS THAT YOU HAVE BEEN MOVING AROUND A LOT MORE THAN USUAL: 1
3. TROUBLE FALLING OR STAYING ASLEEP: 3
9. THOUGHTS THAT YOU WOULD BE BETTER OFF DEAD, OR OF HURTING YOURSELF: 1
1. LITTLE INTEREST OR PLEASURE IN DOING THINGS: 2
4. FEELING TIRED OR HAVING LITTLE ENERGY: 3
10. IF YOU CHECKED OFF ANY PROBLEMS, HOW DIFFICULT HAVE THESE PROBLEMS MADE IT FOR YOU TO DO YOUR WORK, TAKE CARE OF THINGS AT HOME, OR GET ALONG WITH OTHER PEOPLE: 2
SUM OF ALL RESPONSES TO PHQ QUESTIONS 1-9: 20
SUM OF ALL RESPONSES TO PHQ9 QUESTIONS 1 & 2: 4
SUM OF ALL RESPONSES TO PHQ QUESTIONS 1-9: 19
5. POOR APPETITE OR OVEREATING: 3
6. FEELING BAD ABOUT YOURSELF - OR THAT YOU ARE A FAILURE OR HAVE LET YOURSELF OR YOUR FAMILY DOWN: 2
SUM OF ALL RESPONSES TO PHQ QUESTIONS 1-9: 20
SUM OF ALL RESPONSES TO PHQ QUESTIONS 1-9: 20
7. TROUBLE CONCENTRATING ON THINGS, SUCH AS READING THE NEWSPAPER OR WATCHING TELEVISION: 3
2. FEELING DOWN, DEPRESSED OR HOPELESS: 2

## 2023-11-21 NOTE — PROGRESS NOTES
HISTORY OF PRESENT ILLNESS  Chief Complaint   Patient presents with    Anxiety     Pt states she is irritable and angry all the time     Depression     not dealing with stress very well  Tripped over the tip of the toe and fell and hit left forehead      In the past tried :  CYMBALTA) 30 mg  FETZIMA) 40 mg ER  BRINTELLIX) 10 mg  vilazodone (VIIBRYD) 40 mg    no patience for anything. Irritable even with herself. Son and his life partner moved out. people always want you to do stuff for them but can't help her out. Lost too much weight. Still can't concentrate on things. Stopped the mounjaro. Wt Readings from Last 3 Encounters:   11/21/23 53.5 kg (118 lb)   10/26/23 56.7 kg (125 lb)   10/31/23 56.7 kg (125 lb)      BP Readings from Last 3 Encounters:   11/21/23 (!) 165/75   10/26/23 136/68   09/29/23 (!) 151/71      PHQ-9: Over the past 2 weeks, how often have you been bothered by any of the following problems? Little interest or pleasure in doing things: More than half the days  Feeling down, depressed, or hopeless: More than half the days  Trouble falling or staying asleep, or sleeping too much: Nearly every day  Feeling tired or having little energy: Nearly every day  Poor appetite or overeating: Nearly every day  Feeling bad about yourself - or that you are a failure or have let yourself or your family down: More than half the days  Trouble concentrating on things, such as reading the newspaper or watching television: Nearly every day  Moving or speaking so slowly that other people could have noticed.  Or the opposite - being so fidgety or restless that you have been moving around a lot more than usual: Several days  Thoughts that you would be better off dead, or of hurting yourself in some way: Several days  PHQ-9 Total Score: 20  If you checked off any problems, how difficult have these problems made it for you to do your work, take care of things at home, or get along with other people?:

## 2023-12-15 RX ORDER — OLMESARTAN MEDOXOMIL 40 MG/1
TABLET ORAL
Qty: 90 TABLET | Refills: 1 | Status: SHIPPED | OUTPATIENT
Start: 2023-12-15

## 2023-12-20 DIAGNOSIS — F17.200 TOBACCO USE DISORDER: ICD-10-CM

## 2023-12-20 DIAGNOSIS — M25.552 LEFT HIP PAIN: ICD-10-CM

## 2023-12-20 DIAGNOSIS — N20.0 RIGHT NEPHROLITHIASIS: ICD-10-CM

## 2023-12-20 RX ORDER — FLUCONAZOLE 150 MG/1
TABLET ORAL
COMMUNITY
Start: 2023-12-11 | End: 2024-01-09

## 2023-12-20 RX ORDER — VARENICLINE TARTRATE 1 MG/1
1 TABLET, FILM COATED ORAL 2 TIMES DAILY
Qty: 60 TABLET | Refills: 5 | Status: SHIPPED | OUTPATIENT
Start: 2023-12-20

## 2023-12-20 RX ORDER — NITROFURANTOIN 25; 75 MG/1; MG/1
CAPSULE ORAL
COMMUNITY
Start: 2023-12-11 | End: 2024-01-09

## 2023-12-20 RX ORDER — MORPHINE SULFATE 15 MG/1
15 TABLET ORAL 3 TIMES DAILY PRN
Qty: 90 TABLET | Refills: 0 | Status: SHIPPED | OUTPATIENT
Start: 2023-12-20 | End: 2024-01-24 | Stop reason: SDUPTHER

## 2023-12-20 RX ORDER — VARENICLINE TARTRATE 0.5 MG/1
TABLET, FILM COATED ORAL
Qty: 56 TABLET | Refills: 0 | Status: SHIPPED | OUTPATIENT
Start: 2023-12-20

## 2023-12-20 NOTE — TELEPHONE ENCOUNTER
Prescription(s) refilled  It (they) has been escribed to the pharmacy.    Requested Prescriptions     Signed Prescriptions Disp Refills    morphine (MSIR) 15 MG tablet 90 tablet 0     Sig: Take 1 tablet by mouth 3 times daily as needed for Pain for up to 30 days. Max Daily Amount: 45 mg     Authorizing Provider: TAB CALL    varenicline (CHANTIX) 0.5 MG tablet 56 tablet 0     Sig: TAKE 1 TABLET BY MOUTH DAILY FOR 3 DAYS, THEN 1 TAB. TWO TIMES A DAY FOR 4 DAYS THEN TAKE TWO TABLETS BY MOUTH TWICE A DAY     Authorizing Provider: TAB CALL    varenicline (CHANTIX) 1 MG tablet 60 tablet 5     Sig: Take 1 tablet by mouth 2 times daily After finishing the lower dose     Authorizing Provider: TAB CALL     No orders of the defined types were placed in this encounter.          ICD-10-CM    1. Right nephrolithiasis  N20.0       2. Left hip pain  M25.552 morphine (MSIR) 15 MG tablet      3. Tobacco use disorder  F17.200 varenicline (CHANTIX) 0.5 MG tablet     varenicline (CHANTIX) 1 MG tablet

## 2023-12-20 NOTE — TELEPHONE ENCOUNTER
Pt states that she does not see ortho until 1/3 and needs the morphine, pt also states that she would like to be put back on chantix

## 2024-01-03 ENCOUNTER — HOSPITAL ENCOUNTER (OUTPATIENT)
Dept: LAB | Age: 69
Discharge: HOME | End: 2024-01-06
Payer: MEDICARE

## 2024-01-03 ENCOUNTER — OFFICE VISIT (OUTPATIENT)
Dept: ONCOLOGY | Age: 69
End: 2024-01-03
Payer: MEDICARE

## 2024-01-03 VITALS
HEART RATE: 60 BPM | WEIGHT: 121.3 LBS | RESPIRATION RATE: 18 BRPM | TEMPERATURE: 97.8 F | DIASTOLIC BLOOD PRESSURE: 66 MMHG | OXYGEN SATURATION: 97 % | HEIGHT: 60 IN | BODY MASS INDEX: 23.81 KG/M2 | SYSTOLIC BLOOD PRESSURE: 139 MMHG

## 2024-01-03 DIAGNOSIS — D72.9 NEUTROPHILIA: Primary | ICD-10-CM

## 2024-01-03 DIAGNOSIS — D72.9 NEUTROPHILIA: ICD-10-CM

## 2024-01-03 LAB
ALBUMIN SERPL-MCNC: 3.6 G/DL (ref 3.2–4.6)
ALBUMIN/GLOB SERPL: 0.9 (ref 0.4–1.6)
ALP SERPL-CCNC: 60 U/L (ref 50–136)
ALT SERPL-CCNC: 18 U/L (ref 12–65)
ANION GAP SERPL CALC-SCNC: 3 MMOL/L (ref 2–11)
AST SERPL-CCNC: 14 U/L (ref 15–37)
BASOPHILS # BLD: 0.1 K/UL (ref 0–0.2)
BASOPHILS NFR BLD: 1 % (ref 0–2)
BILIRUB SERPL-MCNC: 0.5 MG/DL (ref 0.2–1.1)
BUN SERPL-MCNC: 13 MG/DL (ref 8–23)
CALCIUM SERPL-MCNC: 9.2 MG/DL (ref 8.3–10.4)
CHLORIDE SERPL-SCNC: 99 MMOL/L (ref 103–113)
CO2 SERPL-SCNC: 29 MMOL/L (ref 21–32)
CREAT SERPL-MCNC: 0.7 MG/DL (ref 0.6–1)
DIFFERENTIAL METHOD BLD: ABNORMAL
EOSINOPHIL # BLD: 0.1 K/UL (ref 0–0.8)
EOSINOPHIL NFR BLD: 1 % (ref 0.5–7.8)
ERYTHROCYTE [DISTWIDTH] IN BLOOD BY AUTOMATED COUNT: 15.2 % (ref 11.9–14.6)
GLOBULIN SER CALC-MCNC: 4 G/DL (ref 2.8–4.5)
GLUCOSE SERPL-MCNC: 186 MG/DL (ref 65–100)
HCT VFR BLD AUTO: 46.4 % (ref 35.8–46.3)
HGB BLD-MCNC: 15.3 G/DL (ref 11.7–15.4)
IMM GRANULOCYTES # BLD AUTO: 0.1 K/UL (ref 0–0.5)
IMM GRANULOCYTES NFR BLD AUTO: 0 % (ref 0–5)
LYMPHOCYTES # BLD: 4.4 K/UL (ref 0.5–4.6)
LYMPHOCYTES NFR BLD: 32 % (ref 13–44)
MCH RBC QN AUTO: 27.5 PG (ref 26.1–32.9)
MCHC RBC AUTO-ENTMCNC: 33 G/DL (ref 31.4–35)
MCV RBC AUTO: 83.5 FL (ref 82–102)
MONOCYTES # BLD: 0.7 K/UL (ref 0.1–1.3)
MONOCYTES NFR BLD: 5 % (ref 4–12)
NEUTS SEG # BLD: 8.5 K/UL (ref 1.7–8.2)
NEUTS SEG NFR BLD: 61 % (ref 43–78)
NRBC # BLD: 0 K/UL (ref 0–0.2)
PLATELET # BLD AUTO: 300 K/UL (ref 150–450)
PMV BLD AUTO: 8 FL (ref 9.4–12.3)
POTASSIUM SERPL-SCNC: 4 MMOL/L (ref 3.5–5.1)
PROT SERPL-MCNC: 7.6 G/DL (ref 6.3–8.2)
RBC # BLD AUTO: 5.56 M/UL (ref 4.05–5.2)
SODIUM SERPL-SCNC: 131 MMOL/L (ref 136–146)
WBC # BLD AUTO: 13.9 K/UL (ref 4.3–11.1)

## 2024-01-03 PROCEDURE — 1090F PRES/ABSN URINE INCON ASSESS: CPT | Performed by: INTERNAL MEDICINE

## 2024-01-03 PROCEDURE — 3075F SYST BP GE 130 - 139MM HG: CPT | Performed by: INTERNAL MEDICINE

## 2024-01-03 PROCEDURE — 99213 OFFICE O/P EST LOW 20 MIN: CPT | Performed by: INTERNAL MEDICINE

## 2024-01-03 PROCEDURE — 36415 COLL VENOUS BLD VENIPUNCTURE: CPT

## 2024-01-03 PROCEDURE — 3078F DIAST BP <80 MM HG: CPT | Performed by: INTERNAL MEDICINE

## 2024-01-03 PROCEDURE — G8484 FLU IMMUNIZE NO ADMIN: HCPCS | Performed by: INTERNAL MEDICINE

## 2024-01-03 PROCEDURE — 3017F COLORECTAL CA SCREEN DOC REV: CPT | Performed by: INTERNAL MEDICINE

## 2024-01-03 PROCEDURE — 4004F PT TOBACCO SCREEN RCVD TLK: CPT | Performed by: INTERNAL MEDICINE

## 2024-01-03 PROCEDURE — G8420 CALC BMI NORM PARAMETERS: HCPCS | Performed by: INTERNAL MEDICINE

## 2024-01-03 PROCEDURE — G8427 DOCREV CUR MEDS BY ELIG CLIN: HCPCS | Performed by: INTERNAL MEDICINE

## 2024-01-03 PROCEDURE — 80053 COMPREHEN METABOLIC PANEL: CPT

## 2024-01-03 PROCEDURE — 1123F ACP DISCUSS/DSCN MKR DOCD: CPT | Performed by: INTERNAL MEDICINE

## 2024-01-03 PROCEDURE — G8399 PT W/DXA RESULTS DOCUMENT: HCPCS | Performed by: INTERNAL MEDICINE

## 2024-01-03 PROCEDURE — 85025 COMPLETE CBC W/AUTO DIFF WBC: CPT

## 2024-01-03 ASSESSMENT — PATIENT HEALTH QUESTIONNAIRE - PHQ9
SUM OF ALL RESPONSES TO PHQ9 QUESTIONS 1 & 2: 2
SUM OF ALL RESPONSES TO PHQ QUESTIONS 1-9: 2
1. LITTLE INTEREST OR PLEASURE IN DOING THINGS: 1
2. FEELING DOWN, DEPRESSED OR HOPELESS: 1
SUM OF ALL RESPONSES TO PHQ QUESTIONS 1-9: 2

## 2024-01-03 NOTE — PROGRESS NOTES
negative.    PLAN:  As above.      Given the rather extensive workup she has gone through, there is likely no benefit for her to undergo a routine follow-up here.  We would be more than happy to see her if she has any progression or significant changes in her hemogram.    Thank you Dr Paul for allowing us to paticipate in care of this pleasant patient. Please call w/ any scout Tucker MD   [facility]  Hematology Oncology  [address] [place] [address]  Office : [phone]  Fax : [phone]

## 2024-01-03 NOTE — PATIENT INSTRUCTIONS
Patient Instructions from Today's Visit    Reason for Visit:  Follow up visit for neutrophilia      Plan:    Your white count is still elevated, but is stable.    Follow Up:  - as needed    Recent Lab Results:  Hospital Outpatient Visit on 01/03/2024   Component Date Value Ref Range Status    WBC 01/03/2024 13.9 (H)  4.3 - 11.1 K/uL Final    RBC 01/03/2024 5.56 (H)  4.05 - 5.2 M/uL Final    Hemoglobin 01/03/2024 15.3  11.7 - 15.4 g/dL Final    Hematocrit 01/03/2024 46.4 (H)  35.8 - 46.3 % Final    MCV 01/03/2024 83.5  82.0 - 102.0 FL Final    MCH 01/03/2024 27.5  26.1 - 32.9 PG Final    MCHC 01/03/2024 33.0  31.4 - 35.0 g/dL Final    RDW 01/03/2024 15.2 (H)  11.9 - 14.6 % Final    Platelets 01/03/2024 300  150 - 450 K/uL Final    MPV 01/03/2024 8.0 (L)  9.4 - 12.3 FL Final    nRBC 01/03/2024 0.00  0.0 - 0.2 K/uL Final    **Note: Absolute NRBC parameter is now reported with Hemogram**    Differential Type 01/03/2024 AUTOMATED    Final    Neutrophils % 01/03/2024 61  43 - 78 % Final    Lymphocytes % 01/03/2024 32  13 - 44 % Final    Monocytes % 01/03/2024 5  4.0 - 12.0 % Final    Eosinophils % 01/03/2024 1  0.5 - 7.8 % Final    Basophils % 01/03/2024 1  0.0 - 2.0 % Final    Immature Granulocytes 01/03/2024 0  0.0 - 5.0 % Final    Neutrophils Absolute 01/03/2024 8.5 (H)  1.7 - 8.2 K/UL Final    Lymphocytes Absolute 01/03/2024 4.4  0.5 - 4.6 K/UL Final    Monocytes Absolute 01/03/2024 0.7  0.1 - 1.3 K/UL Final    Eosinophils Absolute 01/03/2024 0.1  0.0 - 0.8 K/UL Final    Basophils Absolute 01/03/2024 0.1  0.0 - 0.2 K/UL Final    Absolute Immature Granulocyte 01/03/2024 0.1  0.0 - 0.5 K/UL Final        Treatment Summary has been discussed and given to patient: n/a        -------------------------------------------------------------------------------------------------------------------  Please call our office at [phone] if you have any  of the following symptoms:   Fever of 100.5 or greater  Chills  Shortness of

## 2024-01-09 ENCOUNTER — OFFICE VISIT (OUTPATIENT)
Dept: FAMILY MEDICINE CLINIC | Facility: CLINIC | Age: 69
End: 2024-01-09
Payer: MEDICARE

## 2024-01-09 ENCOUNTER — TELEPHONE (OUTPATIENT)
Dept: DIABETES SERVICES | Age: 69
End: 2024-01-09

## 2024-01-09 VITALS
SYSTOLIC BLOOD PRESSURE: 178 MMHG | RESPIRATION RATE: 16 BRPM | BODY MASS INDEX: 24.54 KG/M2 | OXYGEN SATURATION: 96 % | HEIGHT: 60 IN | HEART RATE: 62 BPM | WEIGHT: 125 LBS | DIASTOLIC BLOOD PRESSURE: 69 MMHG | TEMPERATURE: 97.7 F

## 2024-01-09 DIAGNOSIS — E78.2 MIXED HYPERLIPIDEMIA: ICD-10-CM

## 2024-01-09 DIAGNOSIS — Z79.899 ENCOUNTER FOR LONG-TERM (CURRENT) USE OF MEDICATIONS: ICD-10-CM

## 2024-01-09 DIAGNOSIS — J30.89 CHRONIC NONSEASONAL ALLERGIC RHINITIS DUE TO POLLEN: ICD-10-CM

## 2024-01-09 DIAGNOSIS — E11.21 TYPE 2 DIABETES WITH NEPHROPATHY (HCC): Primary | ICD-10-CM

## 2024-01-09 DIAGNOSIS — F11.20 CHRONIC NARCOTIC DEPENDENCE (HCC): ICD-10-CM

## 2024-01-09 DIAGNOSIS — F33.1 MAJOR DEPRESSIVE DISORDER, RECURRENT EPISODE, MODERATE (HCC): ICD-10-CM

## 2024-01-09 DIAGNOSIS — R11.0 NAUSEA: ICD-10-CM

## 2024-01-09 DIAGNOSIS — C18.9 MALIGNANT NEOPLASM OF COLON, UNSPECIFIED PART OF COLON (HCC): ICD-10-CM

## 2024-01-09 DIAGNOSIS — E11.21 TYPE 2 DIABETES WITH NEPHROPATHY (HCC): ICD-10-CM

## 2024-01-09 DIAGNOSIS — I10 PRIMARY HYPERTENSION: ICD-10-CM

## 2024-01-09 DIAGNOSIS — K27.9 PUD (PEPTIC ULCER DISEASE): ICD-10-CM

## 2024-01-09 DIAGNOSIS — J44.9 CHRONIC OBSTRUCTIVE PULMONARY DISEASE, UNSPECIFIED COPD TYPE (HCC): ICD-10-CM

## 2024-01-09 DIAGNOSIS — R80.9 MODERATELY INCREASED ALBUMINURIA: ICD-10-CM

## 2024-01-09 DIAGNOSIS — I77.9 PERIPHERAL ARTERY OCCLUSION (HCC): ICD-10-CM

## 2024-01-09 DIAGNOSIS — M25.552 LEFT HIP PAIN: ICD-10-CM

## 2024-01-09 LAB
ANION GAP SERPL CALC-SCNC: 4 MMOL/L (ref 2–11)
BUN SERPL-MCNC: 11 MG/DL (ref 8–23)
CALCIUM SERPL-MCNC: 10.1 MG/DL (ref 8.3–10.4)
CHLORIDE SERPL-SCNC: 96 MMOL/L (ref 103–113)
CHOLEST SERPL-MCNC: 220 MG/DL
CO2 SERPL-SCNC: 30 MMOL/L (ref 21–32)
CREAT SERPL-MCNC: 0.6 MG/DL (ref 0.6–1)
CREAT UR-MCNC: <13 MG/DL
GLUCOSE SERPL-MCNC: 165 MG/DL (ref 65–100)
HDLC SERPL-MCNC: 45 MG/DL (ref 40–60)
HDLC SERPL: 4.9
LDLC SERPL CALC-MCNC: 127.2 MG/DL
MAGNESIUM SERPL-MCNC: 1.9 MG/DL (ref 1.8–2.4)
MICROALBUMIN UR-MCNC: 49.9 MG/DL
MICROALBUMIN/CREAT UR-RTO: NORMAL MG/G (ref 0–30)
POTASSIUM SERPL-SCNC: 4.3 MMOL/L (ref 3.5–5.1)
SODIUM SERPL-SCNC: 130 MMOL/L (ref 136–146)
TRIGL SERPL-MCNC: 239 MG/DL (ref 35–150)
VLDLC SERPL CALC-MCNC: 47.8 MG/DL (ref 6–23)

## 2024-01-09 PROCEDURE — 1090F PRES/ABSN URINE INCON ASSESS: CPT | Performed by: FAMILY MEDICINE

## 2024-01-09 PROCEDURE — 2022F DILAT RTA XM EVC RTNOPTHY: CPT | Performed by: FAMILY MEDICINE

## 2024-01-09 PROCEDURE — 1036F TOBACCO NON-USER: CPT | Performed by: FAMILY MEDICINE

## 2024-01-09 PROCEDURE — 1123F ACP DISCUSS/DSCN MKR DOCD: CPT | Performed by: FAMILY MEDICINE

## 2024-01-09 PROCEDURE — 3017F COLORECTAL CA SCREEN DOC REV: CPT | Performed by: FAMILY MEDICINE

## 2024-01-09 PROCEDURE — G8399 PT W/DXA RESULTS DOCUMENT: HCPCS | Performed by: FAMILY MEDICINE

## 2024-01-09 PROCEDURE — G8427 DOCREV CUR MEDS BY ELIG CLIN: HCPCS | Performed by: FAMILY MEDICINE

## 2024-01-09 PROCEDURE — 3078F DIAST BP <80 MM HG: CPT | Performed by: FAMILY MEDICINE

## 2024-01-09 PROCEDURE — G8420 CALC BMI NORM PARAMETERS: HCPCS | Performed by: FAMILY MEDICINE

## 2024-01-09 PROCEDURE — 99214 OFFICE O/P EST MOD 30 MIN: CPT | Performed by: FAMILY MEDICINE

## 2024-01-09 PROCEDURE — 3077F SYST BP >= 140 MM HG: CPT | Performed by: FAMILY MEDICINE

## 2024-01-09 PROCEDURE — 3046F HEMOGLOBIN A1C LEVEL >9.0%: CPT | Performed by: FAMILY MEDICINE

## 2024-01-09 PROCEDURE — 3023F SPIROM DOC REV: CPT | Performed by: FAMILY MEDICINE

## 2024-01-09 PROCEDURE — G8484 FLU IMMUNIZE NO ADMIN: HCPCS | Performed by: FAMILY MEDICINE

## 2024-01-09 RX ORDER — METOPROLOL SUCCINATE 50 MG/1
TABLET, EXTENDED RELEASE ORAL
Qty: 90 TABLET | Refills: 1 | Status: SHIPPED | OUTPATIENT
Start: 2024-01-09

## 2024-01-09 RX ORDER — AMLODIPINE BESYLATE 5 MG/1
TABLET ORAL
Qty: 90 TABLET | Refills: 1 | Status: SHIPPED | OUTPATIENT
Start: 2024-01-09

## 2024-01-09 RX ORDER — MONTELUKAST SODIUM 10 MG/1
10 TABLET ORAL DAILY
Qty: 90 TABLET | Refills: 3 | Status: SHIPPED | OUTPATIENT
Start: 2024-01-09

## 2024-01-09 RX ORDER — MORPHINE SULFATE 15 MG/1
15 TABLET ORAL 3 TIMES DAILY PRN
Qty: 90 TABLET | Refills: 0 | Status: CANCELLED | OUTPATIENT
Start: 2024-01-09 | End: 2024-02-08

## 2024-01-09 RX ORDER — TIRZEPATIDE 5 MG/.5ML
INJECTION, SOLUTION SUBCUTANEOUS
Qty: 4 ADJUSTABLE DOSE PRE-FILLED PEN SYRINGE | Refills: 5 | Status: SHIPPED | OUTPATIENT
Start: 2024-01-09

## 2024-01-09 RX ORDER — PROMETHAZINE HYDROCHLORIDE 25 MG/1
25 TABLET ORAL EVERY 6 HOURS PRN
Qty: 30 TABLET | Refills: 5 | Status: SHIPPED | OUTPATIENT
Start: 2024-01-09

## 2024-01-09 RX ORDER — ALBUTEROL SULFATE 90 UG/1
AEROSOL, METERED RESPIRATORY (INHALATION)
Qty: 8.5 G | Refills: 3 | Status: SHIPPED | OUTPATIENT
Start: 2024-01-09

## 2024-01-09 RX ORDER — PANTOPRAZOLE SODIUM 40 MG/1
TABLET, DELAYED RELEASE ORAL
Qty: 180 TABLET | Refills: 1 | Status: SHIPPED | OUTPATIENT
Start: 2024-01-09

## 2024-01-09 RX ORDER — ESCITALOPRAM OXALATE 5 MG/1
TABLET ORAL
Qty: 30 TABLET | Refills: 5 | Status: SHIPPED | OUTPATIENT
Start: 2024-01-09

## 2024-01-09 ASSESSMENT — PATIENT HEALTH QUESTIONNAIRE - PHQ9
SUM OF ALL RESPONSES TO PHQ QUESTIONS 1-9: 0
SUM OF ALL RESPONSES TO PHQ QUESTIONS 1-9: 0
1. LITTLE INTEREST OR PLEASURE IN DOING THINGS: 0
SUM OF ALL RESPONSES TO PHQ9 QUESTIONS 1 & 2: 0
10. IF YOU CHECKED OFF ANY PROBLEMS, HOW DIFFICULT HAVE THESE PROBLEMS MADE IT FOR YOU TO DO YOUR WORK, TAKE CARE OF THINGS AT HOME, OR GET ALONG WITH OTHER PEOPLE: 0
5. POOR APPETITE OR OVEREATING: 0
SUM OF ALL RESPONSES TO PHQ QUESTIONS 1-9: 0
4. FEELING TIRED OR HAVING LITTLE ENERGY: 0
2. FEELING DOWN, DEPRESSED OR HOPELESS: 0
7. TROUBLE CONCENTRATING ON THINGS, SUCH AS READING THE NEWSPAPER OR WATCHING TELEVISION: 0
6. FEELING BAD ABOUT YOURSELF - OR THAT YOU ARE A FAILURE OR HAVE LET YOURSELF OR YOUR FAMILY DOWN: 0
3. TROUBLE FALLING OR STAYING ASLEEP: 0
8. MOVING OR SPEAKING SO SLOWLY THAT OTHER PEOPLE COULD HAVE NOTICED. OR THE OPPOSITE, BEING SO FIGETY OR RESTLESS THAT YOU HAVE BEEN MOVING AROUND A LOT MORE THAN USUAL: 0
SUM OF ALL RESPONSES TO PHQ QUESTIONS 1-9: 0
9. THOUGHTS THAT YOU WOULD BE BETTER OFF DEAD, OR OF HURTING YOURSELF: 0

## 2024-01-09 ASSESSMENT — ENCOUNTER SYMPTOMS
NAUSEA: 0
DIARRHEA: 0
VISUAL CHANGE: 0
SHORTNESS OF BREATH: 0
CONSTIPATION: 0
VOMITING: 0
WHEEZING: 0
BLURRED VISION: 0
ABDOMINAL PAIN: 0
COUGH: 0
RHINORRHEA: 0

## 2024-01-09 NOTE — PROGRESS NOTES
Tirzepatide (MOUNJARO) 5 MG/0.5ML SOPN SC injection (Taking)    Sig: Inject 0.5 mLs into the skin once a week after finishing the 2.5 mg dose for diabetes    Notes to Pharmacy: Hold until requested.    Insulin Degludec (TRESIBA FLEXTOUCH) 200 UNIT/ML SOPN (Taking)    Sig: INJECT 50 UNITS UNDER THE SKIN DAILY           [facility] CAD CHF Meds            metoprolol succinate (TOPROL XL) 50 MG extended release tablet (Taking)    Sig: TAKE ONE TABLET BY MOUTH EVERY night    amLODIPine (NORVASC) 5 MG tablet (Taking)    Sig: Take 1 Tablet by mouth daily. ( instead of tribenzor with benicar hct) - Oral    spironolactone (ALDACTONE) 25 MG tablet (Taking)    Sig: TAKE ONE TABLET BY MOUTH EVERY MORNING FOR BLOOD PRESSURE    olmesartan (BENICAR) 40 MG tablet (Taking)    Sig: TAKE 1 TABLET BY MOUTH DAILY FOR BLOOD PRESSURE INSTEAD OF OLMESARTAN HCTZ           Lab Results   Component Value Date    LABA1C 6.0 (H) 09/14/2023     Lab Results   Component Value Date     09/14/2023      Lab Results   Component Value Date    CREATININE 0.70 01/03/2024      Lab Results   Component Value Date    MALBCR 334 (H) 09/14/2023      Wt Readings from Last 3 Encounters:   01/09/24 56.7 kg (125 lb)   01/03/24 55 kg (121 lb 4.8 oz)   11/21/23 53.5 kg (118 lb)      BP Readings from Last 3 Encounters:   01/09/24 (!) 178/69   01/03/24 139/66   11/21/23 (!) 183/75        Diabetes  There are no hypoglycemic associated symptoms. Pertinent negatives for hypoglycemia include no dizziness, headaches, nervousness/anxiousness or tremors. Pertinent negatives for diabetes include no blurred vision, no chest pain, no fatigue, no foot paresthesias, no foot ulcerations, no polydipsia, no polyphagia, no polyuria, no visual change, no weakness and no weight loss. There are no hypoglycemic complications. Symptoms are stable. Pertinent negatives for diabetic complications include no autonomic neuropathy. Risk factors for coronary artery disease include diabetes

## 2024-01-10 ENCOUNTER — PATIENT MESSAGE (OUTPATIENT)
Dept: FAMILY MEDICINE CLINIC | Facility: CLINIC | Age: 69
End: 2024-01-10

## 2024-01-10 LAB
EST. AVERAGE GLUCOSE BLD GHB EST-MCNC: 163 MG/DL
HBA1C MFR BLD: 7.3 % (ref 4.8–5.6)
VIT B12 SERPL-MCNC: 310 PG/ML (ref 193–986)

## 2024-01-10 NOTE — RESULT ENCOUNTER NOTE
Your A1c (your 3 month blood sugar test) is good on your lab work. We prefer to keep it under 7.0 if possible.  We like to keep it at least under 7.5 if at all possible.  That seems to be the safe zone.  Continue your current medications. Keep working on your diet and exercise.    Your triglycerides, total cholesterol and bad cholesterol are all elevated.  Triglycerides are the sugar part of your cholesterol.  Make sure you are eating a heart healthy nutritious diet, getting regular aerobic physical activity, maintaining desired appropriate body weight, and avoiding tobacco products.    Your sodium is a little on the low side.  You may want to add some electrolyte beverages like propel occasionally.  Other labs are normal.

## 2024-01-10 NOTE — TELEPHONE ENCOUNTER
From: Mary Maloney  To: Dr. Lcuina Paul  Sent: 1/10/2024 12:51 PM EST  Subject: Blood work and meds    I forgot to say that I haven't been taking the Red Yeast Rice for about a month for cholesterol but started back today and wanted to know about chloride.What and where do you get that? I will do what you say.  Thanks, Radha

## 2024-01-16 ENCOUNTER — TELEPHONE (OUTPATIENT)
Dept: DIABETES SERVICES | Age: 69
End: 2024-01-16

## 2024-01-16 NOTE — TELEPHONE ENCOUNTER
Type 2.  Instructed education is free.  She says she also sees Dr. Feliz, but when she said she cannot sit long due to bad back she agreed to telephonic education.  Booked a Nutrition One and Diabetes [facility] . Instructed will need two more to complete program. No limit to times attend or if need to aske questions call. Provided phone number for session.  [phone].

## 2024-01-24 ENCOUNTER — PATIENT MESSAGE (OUTPATIENT)
Dept: FAMILY MEDICINE CLINIC | Facility: CLINIC | Age: 69
End: 2024-01-24

## 2024-01-24 DIAGNOSIS — M25.552 LEFT HIP PAIN: ICD-10-CM

## 2024-01-24 DIAGNOSIS — Z91.89: Primary | ICD-10-CM

## 2024-01-24 RX ORDER — MORPHINE SULFATE 15 MG/1
15 TABLET ORAL 3 TIMES DAILY PRN
Qty: 90 TABLET | Refills: 0 | Status: SHIPPED | OUTPATIENT
Start: 2024-03-24 | End: 2024-04-23

## 2024-01-24 RX ORDER — NALOXONE HYDROCHLORIDE 4 MG/.1ML
1 SPRAY NASAL PRN
Qty: 2 EACH | Refills: 5 | Status: SHIPPED | OUTPATIENT
Start: 2024-01-24

## 2024-01-24 RX ORDER — MORPHINE SULFATE 15 MG/1
15 TABLET ORAL 3 TIMES DAILY PRN
Qty: 90 TABLET | Refills: 0 | Status: SHIPPED | OUTPATIENT
Start: 2024-02-23 | End: 2024-03-24

## 2024-01-24 RX ORDER — MORPHINE SULFATE 15 MG/1
15 TABLET ORAL 3 TIMES DAILY PRN
Qty: 90 TABLET | Refills: 0 | Status: SHIPPED | OUTPATIENT
Start: 2024-01-24 | End: 2024-02-23

## 2024-01-24 NOTE — TELEPHONE ENCOUNTER
From: Mary Maloney  To: Dr. Lucina Paul  Sent: 1/24/2024 9:22 AM EST  Subject: Med refill     Need refill for morphine. I only have enough for today. Thank you.

## 2024-01-24 NOTE — TELEPHONE ENCOUNTER
Prescription(s) refilled  It (they) has been escribed to the pharmacy.    Requested Prescriptions     Signed Prescriptions Disp Refills    morphine (MSIR) 15 MG tablet 90 tablet 0     Sig: Take 1 tablet by mouth 3 times daily as needed for Pain for up to 30 days. Max Daily Amount: 45 mg     Authorizing Provider: TAB CALL    morphine (MSIR) 15 MG tablet 90 tablet 0     Sig: Take 1 tablet by mouth 3 times daily as needed for Pain for up to 30 days. Max Daily Amount: 45 mg     Authorizing Provider: TAB CALL    morphine (MSIR) 15 MG tablet 90 tablet 0     Sig: Take 1 tablet by mouth 3 times daily as needed for Pain for up to 30 days. Max Daily Amount: 45 mg     Authorizing Provider: TAB CALL    naloxone (NARCAN) 4 MG/0.1ML LIQD nasal spray 2 each 5     Si spray by Nasal route as needed for Opioid Reversal     Authorizing Provider: TAB CALL     No orders of the defined types were placed in this encounter.          ICD-10-CM    1. At risk for injury due to substance overdose  Z91.89 naloxone (NARCAN) 4 MG/0.1ML LIQD nasal spray      2. Left hip pain  M25.552 morphine (MSIR) 15 MG tablet     morphine (MSIR) 15 MG tablet     morphine (MSIR) 15 MG tablet

## 2024-02-05 ENCOUNTER — TELEPHONE (OUTPATIENT)
Dept: DIABETES SERVICES | Age: 69
End: 2024-02-05

## 2024-02-05 ENCOUNTER — TELEPHONE (OUTPATIENT)
Dept: FAMILY MEDICINE CLINIC | Facility: CLINIC | Age: 69
End: 2024-02-05

## 2024-02-05 NOTE — TELEPHONE ENCOUNTER
Called for Telephonic education for Diabetes One class and had to leave a message with call back number.

## 2024-02-05 NOTE — TELEPHONE ENCOUNTER
Patient called to let Dr Paul know she needs a Hip replacement and they are trying to find a place to get a curved device since she has a back curve

## 2024-02-19 ENCOUNTER — TELEPHONE (OUTPATIENT)
Dept: FAMILY MEDICINE CLINIC | Facility: CLINIC | Age: 69
End: 2024-02-19

## 2024-02-19 DIAGNOSIS — M21.619 BUNION: Primary | ICD-10-CM

## 2024-02-19 DIAGNOSIS — M20.40 HAMMER TOE, ACQUIRED: ICD-10-CM

## 2024-02-19 NOTE — TELEPHONE ENCOUNTER
----- Message from [place] sent at 2/19/2024 10:58 AM EST -----  Subject: Referral Request    Reason for referral request? Podiatrist  Provider patient wants to be referred to(if known):     Provider Phone Number(if known):    Additional Information for Provider? Patient was calling to speak with   someone about getting an appointment to see a Podiatrist. Patient would   like a call back.  ---------------------------------------------------------------------------  --------------  CALL BACK INFO    [phone]; OK to leave message on voicemail  ---------------------------------------------------------------------------  --------------

## 2024-02-19 NOTE — TELEPHONE ENCOUNTER
Pt is requesting a referral to podiatry for Bilateral foot pain . Pt states she has hammer toes and bunions on both feet.

## 2024-02-20 NOTE — TELEPHONE ENCOUNTER
Prescription(s) refilled  It (they) has been escribed to the pharmacy.    Requested Prescriptions      No prescriptions requested or ordered in this encounter     Orders Placed This Encounter   Procedures    Nathanael Hernández DPM, [facility]     Referral Priority:   Routine     Referral Type:   Eval and Treat     Referral Reason:   Specialty Services Required     Referred to Provider:   Axel Garcia MD     Requested Specialty:   Podiatry     Number of Visits Requested:   1           ICD-10-CM    1. Bunion  M21.619 Nathanael Hernández DPM, [facility]      2. Hammer toe, acquired  M20.40 Nathanael Hernández DPM, [facility]

## 2024-02-26 ENCOUNTER — TELEPHONE (OUTPATIENT)
Dept: FAMILY MEDICINE CLINIC | Facility: CLINIC | Age: 69
End: 2024-02-26

## 2024-02-26 NOTE — TELEPHONE ENCOUNTER
Patient states that the Orthopedic Surgeon wants her to see a pulmonologist and the one he referred her to in [place] can't see her until August. She is asking if we have ever referred her to pulmonary and if so who? She also has a couple of other questions that she wants to speak with the MA about.

## 2024-02-26 NOTE — TELEPHONE ENCOUNTER
Pt states that she seen her orthopedist and will need to see pulmonology to get surgical clearance. Informed the patient that her ortho should refer her for this

## 2024-02-29 DIAGNOSIS — M25.552 LEFT HIP PAIN: ICD-10-CM

## 2024-02-29 RX ORDER — MORPHINE SULFATE 15 MG/1
15 TABLET ORAL 3 TIMES DAILY PRN
Qty: 90 TABLET | Refills: 0 | Status: SHIPPED | OUTPATIENT
Start: 2024-02-29 | End: 2024-03-30

## 2024-03-06 ENCOUNTER — TELEPHONE (OUTPATIENT)
Dept: FAMILY MEDICINE CLINIC | Facility: CLINIC | Age: 69
End: 2024-03-06

## 2024-03-06 NOTE — TELEPHONE ENCOUNTER
Pt states that she has had diarrhea since Sunday and everything goes straight through her and she has lots of gas. Offered the patient an appointment and states she will try over the counter antidiarrheal and call back if not improved

## 2024-03-26 NOTE — TELEPHONE ENCOUNTER
----- Message from [place], [place] sent at 11/7/2023  1:17 PM EST -----  Pt is requesting a new referral to Dr. Avi Mace done

## 2024-04-05 DIAGNOSIS — M25.552 LEFT HIP PAIN: ICD-10-CM

## 2024-04-08 RX ORDER — MORPHINE SULFATE 15 MG/1
15 TABLET ORAL 3 TIMES DAILY PRN
Qty: 90 TABLET | Refills: 0 | OUTPATIENT
Start: 2024-04-08 | End: 2024-05-08

## 2024-04-09 ENCOUNTER — OFFICE VISIT (OUTPATIENT)
Dept: FAMILY MEDICINE CLINIC | Facility: CLINIC | Age: 69
End: 2024-04-09

## 2024-04-09 VITALS
RESPIRATION RATE: 16 BRPM | DIASTOLIC BLOOD PRESSURE: 66 MMHG | WEIGHT: 128 LBS | BODY MASS INDEX: 25.13 KG/M2 | OXYGEN SATURATION: 97 % | HEIGHT: 60 IN | HEART RATE: 79 BPM | TEMPERATURE: 98.2 F | SYSTOLIC BLOOD PRESSURE: 125 MMHG

## 2024-04-09 DIAGNOSIS — F33.1 MAJOR DEPRESSIVE DISORDER, RECURRENT EPISODE, MODERATE (HCC): ICD-10-CM

## 2024-04-09 DIAGNOSIS — J44.9 CHRONIC OBSTRUCTIVE PULMONARY DISEASE, UNSPECIFIED COPD TYPE (HCC): ICD-10-CM

## 2024-04-09 DIAGNOSIS — E11.21 TYPE 2 DIABETES WITH NEPHROPATHY (HCC): Primary | ICD-10-CM

## 2024-04-09 DIAGNOSIS — K27.9 PUD (PEPTIC ULCER DISEASE): ICD-10-CM

## 2024-04-09 DIAGNOSIS — Z12.31 ENCOUNTER FOR SCREENING MAMMOGRAM FOR BREAST CANCER: ICD-10-CM

## 2024-04-09 DIAGNOSIS — F17.200 TOBACCO USE DISORDER: ICD-10-CM

## 2024-04-09 DIAGNOSIS — I10 PRIMARY HYPERTENSION: ICD-10-CM

## 2024-04-09 DIAGNOSIS — E78.2 MIXED HYPERLIPIDEMIA: ICD-10-CM

## 2024-04-09 DIAGNOSIS — R11.0 NAUSEA: ICD-10-CM

## 2024-04-09 LAB
ALBUMIN SERPL-MCNC: 4.2 G/DL (ref 3.2–4.6)
ALBUMIN/GLOB SERPL: 1.1 (ref 0.4–1.6)
ALP SERPL-CCNC: 90 U/L (ref 50–136)
ALT SERPL-CCNC: 60 U/L (ref 12–65)
ANION GAP SERPL CALC-SCNC: 4 MMOL/L (ref 2–11)
AST SERPL-CCNC: 28 U/L (ref 15–37)
BILIRUB SERPL-MCNC: 0.3 MG/DL (ref 0.2–1.1)
BUN SERPL-MCNC: 23 MG/DL (ref 8–23)
CALCIUM SERPL-MCNC: 10.8 MG/DL (ref 8.3–10.4)
CHLORIDE SERPL-SCNC: 101 MMOL/L (ref 103–113)
CHOLEST SERPL-MCNC: 142 MG/DL
CO2 SERPL-SCNC: 31 MMOL/L (ref 21–32)
CREAT SERPL-MCNC: 0.8 MG/DL (ref 0.6–1)
GLOBULIN SER CALC-MCNC: 3.9 G/DL (ref 2.8–4.5)
GLUCOSE SERPL-MCNC: 172 MG/DL (ref 65–100)
HDLC SERPL-MCNC: 67 MG/DL (ref 40–60)
HDLC SERPL: 2.1
LDLC SERPL CALC-MCNC: 51.8 MG/DL
POTASSIUM SERPL-SCNC: 3.9 MMOL/L (ref 3.5–5.1)
PROT SERPL-MCNC: 8.1 G/DL (ref 6.3–8.2)
SODIUM SERPL-SCNC: 136 MMOL/L (ref 136–146)
TRIGL SERPL-MCNC: 116 MG/DL (ref 35–150)
VLDLC SERPL CALC-MCNC: 23.2 MG/DL (ref 6–23)

## 2024-04-09 RX ORDER — METOPROLOL SUCCINATE 50 MG/1
TABLET, EXTENDED RELEASE ORAL
Qty: 90 TABLET | Refills: 1 | Status: SHIPPED | OUTPATIENT
Start: 2024-04-09

## 2024-04-09 RX ORDER — OLMESARTAN MEDOXOMIL 40 MG/1
TABLET ORAL
Qty: 90 TABLET | Refills: 1 | Status: SHIPPED | OUTPATIENT
Start: 2024-04-09

## 2024-04-09 RX ORDER — ESCITALOPRAM OXALATE 5 MG/1
TABLET ORAL
Qty: 30 TABLET | Refills: 5 | Status: SHIPPED | OUTPATIENT
Start: 2024-04-09

## 2024-04-09 RX ORDER — PANTOPRAZOLE SODIUM 40 MG/1
TABLET, DELAYED RELEASE ORAL
Qty: 180 TABLET | Refills: 1 | Status: SHIPPED | OUTPATIENT
Start: 2024-04-09

## 2024-04-09 RX ORDER — TIRZEPATIDE 5 MG/.5ML
INJECTION, SOLUTION SUBCUTANEOUS
Qty: 4 ADJUSTABLE DOSE PRE-FILLED PEN SYRINGE | Refills: 5 | Status: CANCELLED | OUTPATIENT
Start: 2024-04-09

## 2024-04-09 RX ORDER — SPIRONOLACTONE 25 MG/1
TABLET ORAL
Qty: 90 TABLET | Refills: 1 | Status: SHIPPED | OUTPATIENT
Start: 2024-04-09

## 2024-04-09 RX ORDER — INSULIN DEGLUDEC 200 U/ML
INJECTION, SOLUTION SUBCUTANEOUS
Qty: 18 ML | Refills: 5 | Status: SHIPPED | OUTPATIENT
Start: 2024-04-09

## 2024-04-09 RX ORDER — PROMETHAZINE HYDROCHLORIDE 25 MG/1
25 TABLET ORAL EVERY 6 HOURS PRN
Qty: 30 TABLET | Refills: 5 | Status: SHIPPED | OUTPATIENT
Start: 2024-04-09

## 2024-04-09 RX ORDER — ROSUVASTATIN CALCIUM 20 MG/1
20 TABLET, COATED ORAL DAILY
COMMUNITY

## 2024-04-09 RX ORDER — CYCLOBENZAPRINE HCL 10 MG
10 TABLET ORAL 3 TIMES DAILY PRN
COMMUNITY

## 2024-04-09 RX ORDER — AMLODIPINE BESYLATE 5 MG/1
TABLET ORAL
Qty: 90 TABLET | Refills: 1 | Status: SHIPPED | OUTPATIENT
Start: 2024-04-09

## 2024-04-09 RX ORDER — VARENICLINE TARTRATE 1 MG/1
1 TABLET, FILM COATED ORAL 2 TIMES DAILY
Qty: 60 TABLET | Refills: 5 | Status: SHIPPED | OUTPATIENT
Start: 2024-04-09

## 2024-04-09 RX ORDER — TIRZEPATIDE 7.5 MG/.5ML
7.5 INJECTION, SOLUTION SUBCUTANEOUS WEEKLY
Qty: 12 ADJUSTABLE DOSE PRE-FILLED PEN SYRINGE | Refills: 5 | Status: SHIPPED | OUTPATIENT
Start: 2024-04-09

## 2024-04-09 ASSESSMENT — PATIENT HEALTH QUESTIONNAIRE - PHQ9
8. MOVING OR SPEAKING SO SLOWLY THAT OTHER PEOPLE COULD HAVE NOTICED. OR THE OPPOSITE, BEING SO FIGETY OR RESTLESS THAT YOU HAVE BEEN MOVING AROUND A LOT MORE THAN USUAL: NOT AT ALL
5. POOR APPETITE OR OVEREATING: NOT AT ALL
SUM OF ALL RESPONSES TO PHQ QUESTIONS 1-9: 0
3. TROUBLE FALLING OR STAYING ASLEEP: NOT AT ALL
2. FEELING DOWN, DEPRESSED OR HOPELESS: NOT AT ALL
6. FEELING BAD ABOUT YOURSELF - OR THAT YOU ARE A FAILURE OR HAVE LET YOURSELF OR YOUR FAMILY DOWN: NOT AT ALL
SUM OF ALL RESPONSES TO PHQ QUESTIONS 1-9: 0
SUM OF ALL RESPONSES TO PHQ9 QUESTIONS 1 & 2: 0
SUM OF ALL RESPONSES TO PHQ QUESTIONS 1-9: 0
SUM OF ALL RESPONSES TO PHQ QUESTIONS 1-9: 0
10. IF YOU CHECKED OFF ANY PROBLEMS, HOW DIFFICULT HAVE THESE PROBLEMS MADE IT FOR YOU TO DO YOUR WORK, TAKE CARE OF THINGS AT HOME, OR GET ALONG WITH OTHER PEOPLE: NOT DIFFICULT AT ALL
4. FEELING TIRED OR HAVING LITTLE ENERGY: NOT AT ALL
9. THOUGHTS THAT YOU WOULD BE BETTER OFF DEAD, OR OF HURTING YOURSELF: NOT AT ALL
1. LITTLE INTEREST OR PLEASURE IN DOING THINGS: NOT AT ALL
7. TROUBLE CONCENTRATING ON THINGS, SUCH AS READING THE NEWSPAPER OR WATCHING TELEVISION: NOT AT ALL

## 2024-04-09 ASSESSMENT — ENCOUNTER SYMPTOMS
COUGH: 0
VISUAL CHANGE: 0
DIARRHEA: 0
VOMITING: 0
NAUSEA: 0
WHEEZING: 0
BLURRED VISION: 0
RHINORRHEA: 0
BACK PAIN: 1
ABDOMINAL PAIN: 0
COLOR CHANGE: 0
SHORTNESS OF BREATH: 0
CONSTIPATION: 0

## 2024-04-09 NOTE — TELEPHONE ENCOUNTER
Prescription(s) denied.  Note has been escribed to the pharmacy.    Requested Prescriptions     Refused Prescriptions Disp Refills    morphine (MSIR) 15 MG tablet 90 tablet 0     Sig: Take 1 tablet by mouth 3 times daily as needed for Pain for up to 30 days. Max Daily Amount: 45 mg     Refused By: TAB CALL     Reason for Refusal: Patient has requested refill too soon

## 2024-04-09 NOTE — PROGRESS NOTES
HISTORY OF PRESENT ILLNESS  Chief Complaint   Patient presents with    Diabetes     NOTICE FOR THE PATIENT: This clinical note is not designed to be interpreted by patients.  We do not recommend reading it unless you have medical training. These notes may contain candid and (unintentionally) offensive descriptions, which are sometimes required for accurate documentation. If you would like more information about your healthcare, please obtain it directly by myself or my staff/colleagues - never solely from the notes. Thank you for your understanding and cooperation.     Had diarrhea for 2 weeks straight ad lost weight.  Goes from constipation to diarrhea.  Nauseated.  Started drinking the premeire chocolates    Has an appt with podiatry and will see the GI for EGD and colonoscopy.    Back was hurting last night.  Didn't sleep at all.    Dr. Coronel referred her to pulmonary in [place].  Will not schedule her until after 8/24.  Needs preop clearance.    Blood sugar had been good.  Out of mounjaro for 2 week    Diabetes Mellitus: Patient presents for follow up of diabetes. Symptoms: taking medications as instructed, no medication side effects noted, no TIA's, no chest pain on exertion, no dyspnea on exertion, no swelling of ankles. Symptoms have reasonably well controlled.    Patient denies chest pain.  She states she is compliant most of the time with her  medications. She  states she is compliant most of the time with her  diet.  Evaluation to date has been included below.   Home sugars: BGs consistently in an acceptable range. Treatment to date: medications.  Diabetic issues reviewed with her: low cholesterol diet, weight control and daily exercise discussed.  Diabetic Medications       Insulin       Insulin Degludec (TRESIBA FLEXTOUCH) 200 UNIT/ML SOPN INJECT 50 UNITS UNDER THE SKIN DAILY       Incretin Mimetic Agents       Tirzepatide (MOUNJARO) 7.5 MG/0.5ML SOPN SC injection Inject 0.5 mLs into the skin

## 2024-04-10 ENCOUNTER — PATIENT MESSAGE (OUTPATIENT)
Dept: FAMILY MEDICINE CLINIC | Facility: CLINIC | Age: 69
End: 2024-04-10

## 2024-04-10 LAB
EST. AVERAGE GLUCOSE BLD GHB EST-MCNC: 120 MG/DL
HBA1C MFR BLD: 5.8 % (ref 4.8–5.6)

## 2024-04-10 NOTE — RESULT ENCOUNTER NOTE
Your A1c is good on your lab work. We like to keep it under 7.5 if at all possible.  That seems to be the safe zone.  Your cholesterol looks good.  Your chemistry looks good.  Blood sugar was elevated at 172.  Your calcium was slightly elevated.  We should recheck that at your next visit.

## 2024-04-10 NOTE — TELEPHONE ENCOUNTER
From: Mary Maloney  To: Dr. Lucina Paul  Sent: 4/10/2024 7:45 AM EDT  Subject: Blood tests     I don’t understand my results, would you tell me if they are good or bad? Thank you

## 2024-04-12 ENCOUNTER — TELEPHONE (OUTPATIENT)
Dept: FAMILY MEDICINE CLINIC | Facility: CLINIC | Age: 69
End: 2024-04-12

## 2024-04-12 NOTE — TELEPHONE ENCOUNTER
Pt calling to inform dr renteria that her back and stomach are in a  lot of pain still and she does not know what to do next and asking for advice. Her gastro dr is running a few test to see if they can find out if she has a bowel infection.

## 2024-04-12 NOTE — TELEPHONE ENCOUNTER
She may need to be seen.  She has bad plaque in her aorta.  That could contribute.  She has had evidence of small bowel obstruction in the past.  She may want to go to the ER if she gets worse so that they can do a CT of the abdomen if needed.

## 2024-04-29 ENCOUNTER — TELEPHONE (OUTPATIENT)
Dept: PHARMACY | Facility: CLINIC | Age: 69
End: 2024-04-29

## 2024-04-29 NOTE — TELEPHONE ENCOUNTER
Pharmacy #[id number] -...   ROSUVASTATIN CALCIUM  20 MG TABS 02/07/2023 30 30 tablet Lucina Paul MD [id number] -...   ROSUVASTATIN CALCIUM  20 MG TABS 01/13/2023 30 30 tablet Lucina Paul MD [id number] -...   Last Rx 6/13/23 x 90 day supply 5 refills; was removed from med list 10/26/23 but now on med list as historical; believe has refill at pharmacy    Lab Results   Component Value Date    CHOL 142 04/09/2024    TRIG 116 04/09/2024    HDL 67 (H) 04/09/2024    LDLCALC 51.8 04/09/2024     ALT   Date Value Ref Range Status   04/09/2024 60 12 - 65 U/L Final     AST   Date Value Ref Range Status   04/09/2024 28 15 - 37 U/L Final     The 10-year ASCVD risk score (Ashu HICKEY, et al., 2019) is: 27.2%    Values used to calculate the score:      Age: 68 years      Sex: Female      Is Non- : No      Diabetic: Yes      Tobacco smoker: Yes      Systolic Blood Pressure: 125 mmHg      Is BP treated: Yes      HDL Cholesterol: 67 MG/DL      Total Cholesterol: 142 MG/DL     PLAN  Per insurer report, LIS-2 - may be able to receive up to a 100-day supply for the same cost as a 30-day supply.    The following are interventions that have been identified:   Patient OVERDUE refilling Mounjaro and active on home medication list.   2 fill  Appears dose increased from 5 mg to 7.5 mg at 4/9/24 OV - was sent to [facility]; availability issues possibly contributing?  Of note is also prescribed Tresiba insulin; no fills yet in 2024 but appears has active Rx at [facility]; last A1c 5.8% - is she still using insulin?    Reached patient to review - not currently taking Mounjaro. [facility] did not have in stock ([facility] in [place]). Pt has been out of Mounjaro for about 4 weeks. Confirmed plan to increase to 7.5 mg. Believes [facility] will notify her when it becomes available. Confirmed she is still taking Tresiba - currently using 34 units daily, blood sugars doing OK but she has been eating more. Mounjaro was curbing

## 2024-05-15 ENCOUNTER — TELEPHONE (OUTPATIENT)
Dept: FAMILY MEDICINE CLINIC | Facility: CLINIC | Age: 69
End: 2024-05-15

## 2024-05-15 NOTE — TELEPHONE ENCOUNTER
Spoke to the patient. Pt states that she has forms to fill out for diabetic shoes. Pt scheduled for foot exam

## 2024-05-15 NOTE — TELEPHONE ENCOUNTER
Patient states that she saw the podiatrist and was told that there is nothing surgically that he can do for her foot. She finally got her Mounjaro yesterday. States prior to injection her blood sugar was over 300 but this morning it was 86. States that her hands a shaking now and she is asking to speak with Awilda.

## 2024-05-20 ASSESSMENT — ENCOUNTER SYMPTOMS
VISUAL CHANGE: 0
BLURRED VISION: 0

## 2024-05-21 ENCOUNTER — OFFICE VISIT (OUTPATIENT)
Dept: FAMILY MEDICINE CLINIC | Facility: CLINIC | Age: 69
End: 2024-05-21
Payer: COMMERCIAL

## 2024-05-21 VITALS
HEART RATE: 74 BPM | HEIGHT: 60 IN | OXYGEN SATURATION: 95 % | DIASTOLIC BLOOD PRESSURE: 66 MMHG | SYSTOLIC BLOOD PRESSURE: 142 MMHG | WEIGHT: 137.6 LBS | BODY MASS INDEX: 27.01 KG/M2 | TEMPERATURE: 97.9 F | RESPIRATION RATE: 16 BRPM

## 2024-05-21 DIAGNOSIS — I77.9 PERIPHERAL ARTERY OCCLUSION (HCC): ICD-10-CM

## 2024-05-21 DIAGNOSIS — M25.552 LEFT HIP PAIN: ICD-10-CM

## 2024-05-21 DIAGNOSIS — Z12.11 SCREEN FOR COLON CANCER: ICD-10-CM

## 2024-05-21 DIAGNOSIS — I10 ESSENTIAL HYPERTENSION: ICD-10-CM

## 2024-05-21 DIAGNOSIS — R80.9 MODERATELY INCREASED ALBUMINURIA: ICD-10-CM

## 2024-05-21 DIAGNOSIS — F51.01 PRIMARY INSOMNIA: ICD-10-CM

## 2024-05-21 DIAGNOSIS — J44.9 CHRONIC OBSTRUCTIVE PULMONARY DISEASE, UNSPECIFIED COPD TYPE (HCC): ICD-10-CM

## 2024-05-21 DIAGNOSIS — E11.21 TYPE 2 DIABETES WITH NEPHROPATHY (HCC): Primary | ICD-10-CM

## 2024-05-21 PROCEDURE — 3044F HG A1C LEVEL LT 7.0%: CPT | Performed by: FAMILY MEDICINE

## 2024-05-21 PROCEDURE — 3077F SYST BP >= 140 MM HG: CPT | Performed by: FAMILY MEDICINE

## 2024-05-21 PROCEDURE — 3078F DIAST BP <80 MM HG: CPT | Performed by: FAMILY MEDICINE

## 2024-05-21 PROCEDURE — 99214 OFFICE O/P EST MOD 30 MIN: CPT | Performed by: FAMILY MEDICINE

## 2024-05-21 PROCEDURE — 1123F ACP DISCUSS/DSCN MKR DOCD: CPT | Performed by: FAMILY MEDICINE

## 2024-05-21 RX ORDER — MORPHINE SULFATE 15 MG/1
15 TABLET ORAL 3 TIMES DAILY PRN
Qty: 90 TABLET | Refills: 0 | Status: SHIPPED | OUTPATIENT
Start: 2024-06-20 | End: 2024-07-20

## 2024-05-21 RX ORDER — MORPHINE SULFATE 15 MG/1
15 TABLET ORAL EVERY 4 HOURS PRN
COMMUNITY

## 2024-05-21 RX ORDER — MORPHINE SULFATE 15 MG/1
15 TABLET ORAL 3 TIMES DAILY PRN
Qty: 90 TABLET | Refills: 0 | Status: SHIPPED | OUTPATIENT
Start: 2024-05-21 | End: 2024-06-20

## 2024-05-21 RX ORDER — MORPHINE SULFATE 15 MG/1
15 TABLET ORAL 3 TIMES DAILY PRN
Qty: 90 TABLET | Refills: 0 | Status: SHIPPED | OUTPATIENT
Start: 2024-07-20 | End: 2024-08-19

## 2024-05-21 SDOH — ECONOMIC STABILITY: FOOD INSECURITY: WITHIN THE PAST 12 MONTHS, THE FOOD YOU BOUGHT JUST DIDN'T LAST AND YOU DIDN'T HAVE MONEY TO GET MORE.: NEVER TRUE

## 2024-05-21 SDOH — ECONOMIC STABILITY: INCOME INSECURITY: HOW HARD IS IT FOR YOU TO PAY FOR THE VERY BASICS LIKE FOOD, HOUSING, MEDICAL CARE, AND HEATING?: NOT HARD AT ALL

## 2024-05-21 ASSESSMENT — ENCOUNTER SYMPTOMS
ABDOMINAL PAIN: 0
NAUSEA: 0
RHINORRHEA: 0
CONSTIPATION: 0
DIARRHEA: 0
SHORTNESS OF BREATH: 0
COUGH: 0
VOMITING: 0
WHEEZING: 0

## 2024-05-21 NOTE — PROGRESS NOTES
HISTORY OF PRESENT ILLNESS  Chief Complaint   Patient presents with    Follow-up     Pt is here to speak with you about her recent visit at the podiatrist, will need orthotics      NOTICE FOR THE PATIENT: This clinical note is not designed to be interpreted by patients.  We do not recommend reading it unless you have medical training. These notes may contain candid and (unintentionally) offensive descriptions, which are sometimes required for accurate documentation. If you would like more information about your healthcare, please obtain it directly by myself or my staff/colleagues - never solely from the notes. Thank you for your understanding and cooperation.       Pt is here to speak with you about her recent visit at the podiatrist, will need orthotics     Was off of the mounjaro and now back on it and the sugars are back down to 87.  Taking 34 units a day of insulin.    Podatry said wasn't a candidate for surgery.    When in the bed at night anf where the foot touches the bed feels likesomething stabbing her heel.    Had a cough the other week that sounded wet.  That has gone away.    Diabetes Mellitus: Patient presents for follow up of diabetes. Symptoms: taking medications as instructed, no medication side effects noted, no TIA's, no chest pain on exertion, no dyspnea on exertion, no swelling of ankles. Symptoms have reasonably well controlled.    Patient denies chest pain.  She states she is compliant most of the time with her  medications. She  states she is compliant most of the time with her  diet.  Evaluation to date has been included below.   Home sugars: BGs consistently in an acceptable range. Treatment to date: medications.  Diabetic issues reviewed with her: low cholesterol diet, weight control and daily exercise discussed.  Diabetic Medications       Insulin       Insulin Degludec (TRESIBA FLEXTOUCH) 200 UNIT/ML SOPN INJECT 50 UNITS UNDER THE SKIN DAILY       Incretin Mimetic Agents

## 2024-06-11 ENCOUNTER — TELEPHONE (OUTPATIENT)
Dept: FAMILY MEDICINE CLINIC | Facility: CLINIC | Age: 69
End: 2024-06-11

## 2024-06-11 DIAGNOSIS — M54.40 CHRONIC BILATERAL LOW BACK PAIN WITH SCIATICA, SCIATICA LATERALITY UNSPECIFIED: Primary | ICD-10-CM

## 2024-06-11 DIAGNOSIS — G89.29 CHRONIC BILATERAL LOW BACK PAIN WITH SCIATICA, SCIATICA LATERALITY UNSPECIFIED: Primary | ICD-10-CM

## 2024-06-11 RX ORDER — CYCLOBENZAPRINE HCL 10 MG
10 TABLET ORAL 3 TIMES DAILY PRN
Qty: 90 TABLET | Refills: 5 | Status: SHIPPED | OUTPATIENT
Start: 2024-06-11

## 2024-06-11 NOTE — TELEPHONE ENCOUNTER
Prescription (s) refilled  It (they) has been escribed to the pharmacy.    Requested Prescriptions     Signed Prescriptions Disp Refills    cyclobenzaprine (FLEXERIL) 10 MG tablet 90 tablet 5     Sig: Take 1 tablet by mouth 3 times daily as needed for Muscle spasms     Authorizing Provider: TAB CALL     No orders of the defined types were placed in this encounter.          ICD-10-CM    1. Chronic bilateral low back pain with sciatica, sciatica laterality unspecified  M54.40 cyclobenzaprine (FLEXERIL) 10 MG tablet    G89.29

## 2024-07-11 ENCOUNTER — LAB (OUTPATIENT)
Dept: FAMILY MEDICINE CLINIC | Facility: CLINIC | Age: 69
End: 2024-07-11

## 2024-07-11 DIAGNOSIS — E11.21 TYPE 2 DIABETES WITH NEPHROPATHY (HCC): Primary | ICD-10-CM

## 2024-07-11 DIAGNOSIS — E11.21 TYPE 2 DIABETES WITH NEPHROPATHY (HCC): ICD-10-CM

## 2024-07-11 DIAGNOSIS — I10 PRIMARY HYPERTENSION: ICD-10-CM

## 2024-07-11 DIAGNOSIS — E83.52 HYPERCALCEMIA: ICD-10-CM

## 2024-07-11 LAB
ALBUMIN SERPL-MCNC: 3.9 G/DL (ref 3.2–4.6)
ALBUMIN/GLOB SERPL: 1.2 (ref 1–1.9)
ALP SERPL-CCNC: 60 U/L (ref 35–104)
ALT SERPL-CCNC: 22 U/L (ref 12–65)
ANION GAP SERPL CALC-SCNC: 10 MMOL/L (ref 9–18)
AST SERPL-CCNC: 21 U/L (ref 15–37)
BASOPHILS # BLD: 0.1 K/UL (ref 0–0.2)
BASOPHILS NFR BLD: 1 % (ref 0–2)
BILIRUB SERPL-MCNC: 0.5 MG/DL (ref 0–1.2)
BUN SERPL-MCNC: 15 MG/DL (ref 8–23)
CALCIUM SERPL-MCNC: 9.3 MG/DL (ref 8.8–10.2)
CALCIUM SERPL-MCNC: 9.9 MG/DL (ref 8.8–10.2)
CHLORIDE SERPL-SCNC: 98 MMOL/L (ref 98–107)
CO2 SERPL-SCNC: 28 MMOL/L (ref 20–28)
CREAT SERPL-MCNC: 0.98 MG/DL (ref 0.6–1.1)
DIFFERENTIAL METHOD BLD: ABNORMAL
EOSINOPHIL # BLD: 0.2 K/UL (ref 0–0.8)
EOSINOPHIL NFR BLD: 1 % (ref 0.5–7.8)
ERYTHROCYTE [DISTWIDTH] IN BLOOD BY AUTOMATED COUNT: 12.8 % (ref 11.9–14.6)
EST. AVERAGE GLUCOSE BLD GHB EST-MCNC: 133 MG/DL
GLOBULIN SER CALC-MCNC: 3.1 G/DL (ref 2.3–3.5)
GLUCOSE SERPL-MCNC: 66 MG/DL (ref 70–99)
HBA1C MFR BLD: 6.3 % (ref 0–5.6)
HCT VFR BLD AUTO: 41.5 % (ref 35.8–46.3)
HGB BLD-MCNC: 13.8 G/DL (ref 11.7–15.4)
IMM GRANULOCYTES # BLD AUTO: 0.1 K/UL (ref 0–0.5)
IMM GRANULOCYTES NFR BLD AUTO: 0 % (ref 0–5)
LYMPHOCYTES # BLD: 2.8 K/UL (ref 0.5–4.6)
LYMPHOCYTES NFR BLD: 25 % (ref 13–44)
MCH RBC QN AUTO: 30.3 PG (ref 26.1–32.9)
MCHC RBC AUTO-ENTMCNC: 33.3 G/DL (ref 31.4–35)
MCV RBC AUTO: 91 FL (ref 82–102)
MONOCYTES # BLD: 0.8 K/UL (ref 0.1–1.3)
MONOCYTES NFR BLD: 7 % (ref 4–12)
NEUTS SEG # BLD: 7.3 K/UL (ref 1.7–8.2)
NEUTS SEG NFR BLD: 66 % (ref 43–78)
NRBC # BLD: 0 K/UL (ref 0–0.2)
PLATELET # BLD AUTO: 299 K/UL (ref 150–450)
PMV BLD AUTO: 8.4 FL (ref 9.4–12.3)
POTASSIUM SERPL-SCNC: 4.3 MMOL/L (ref 3.5–5.1)
PROT SERPL-MCNC: 7 G/DL (ref 6.3–8.2)
PTH-INTACT SERPL-MCNC: 31.9 PG/ML (ref 15–65)
RBC # BLD AUTO: 4.56 M/UL (ref 4.05–5.2)
SODIUM SERPL-SCNC: 136 MMOL/L (ref 136–145)
WBC # BLD AUTO: 11.2 K/UL (ref 4.3–11.1)

## 2024-07-11 NOTE — PROGRESS NOTES
Orders Placed This Encounter   Procedures    PTH, Intact     Standing Status:   Future     Standing Expiration Date:   7/11/2025    Hemoglobin A1C     Standing Status:   Future     Standing Expiration Date:   1/11/2026    Comprehensive Metabolic Panel     Standing Status:   Future     Standing Expiration Date:   1/11/2026    CBC with Auto Differential     Standing Status:   Future     Standing Expiration Date:   1/11/2026

## 2024-07-15 NOTE — PROGRESS NOTES
from Last 3 Encounters:   07/16/24 60.8 kg (134 lb)   05/21/24 62.4 kg (137 lb 9.6 oz)   04/09/24 58.1 kg (128 lb)      BP Readings from Last 3 Encounters:   07/16/24 109/62   05/21/24 (!) 142/66   04/09/24 125/66      PHQ-9: Over the past 2 weeks, how often have you been bothered by any of the following problems?  Little interest or pleasure in doing things: Nearly every day  Feeling down, depressed, or hopeless: Several days  Trouble falling or staying asleep, or sleeping too much: Several days (Sleeping too much)  Feeling tired or having little energy: Nearly every day  Poor appetite or overeating: Not at all  Feeling bad about yourself - or that you are a failure or have let yourself or your family down: Several days (Let herself down)  Trouble concentrating on things, such as reading the newspaper or watching television: Nearly every day  Moving or speaking so slowly that other people could have noticed. Or the opposite - being so fidgety or restless that you have been moving around a lot more than usual: Several days  Thoughts that you would be better off dead, or of hurting yourself in some way: Not at all  PHQ-9 Total Score: 13  If you checked off any problems, how difficult have these problems made it for you to do your work, take care of things at home, or get along with other people?: Very difficult      Diabetes  She presents for her follow-up diabetic visit. She has type 2 diabetes mellitus. Hypoglycemia symptoms include nervousness/anxiousness and tremors. Pertinent negatives for hypoglycemia include no dizziness or headaches. Associated symptoms include weakness. Pertinent negatives for diabetes include no blurred vision, no chest pain, no fatigue, no foot paresthesias, no foot ulcerations, no polydipsia, no polyphagia, no polyuria, no visual change and no weight loss. There are no hypoglycemic complications. Symptoms are stable. Pertinent negatives for diabetic complications include no autonomic

## 2024-07-16 ENCOUNTER — OFFICE VISIT (OUTPATIENT)
Dept: FAMILY MEDICINE CLINIC | Facility: CLINIC | Age: 69
End: 2024-07-16
Payer: COMMERCIAL

## 2024-07-16 VITALS
WEIGHT: 134 LBS | HEART RATE: 71 BPM | TEMPERATURE: 97.7 F | DIASTOLIC BLOOD PRESSURE: 62 MMHG | BODY MASS INDEX: 26.31 KG/M2 | RESPIRATION RATE: 16 BRPM | SYSTOLIC BLOOD PRESSURE: 109 MMHG | HEIGHT: 60 IN | OXYGEN SATURATION: 97 %

## 2024-07-16 DIAGNOSIS — E11.21 TYPE 2 DIABETES WITH NEPHROPATHY (HCC): ICD-10-CM

## 2024-07-16 DIAGNOSIS — Z00.00 MEDICARE ANNUAL WELLNESS VISIT, SUBSEQUENT: Primary | ICD-10-CM

## 2024-07-16 DIAGNOSIS — J30.89 CHRONIC NONSEASONAL ALLERGIC RHINITIS DUE TO POLLEN: ICD-10-CM

## 2024-07-16 DIAGNOSIS — M25.552 LEFT HIP PAIN: ICD-10-CM

## 2024-07-16 DIAGNOSIS — K27.9 PUD (PEPTIC ULCER DISEASE): ICD-10-CM

## 2024-07-16 DIAGNOSIS — Z87.891 PERSONAL HISTORY OF TOBACCO USE: ICD-10-CM

## 2024-07-16 DIAGNOSIS — R11.0 NAUSEA: ICD-10-CM

## 2024-07-16 DIAGNOSIS — I10 PRIMARY HYPERTENSION: ICD-10-CM

## 2024-07-16 DIAGNOSIS — R25.1 TREMOR: ICD-10-CM

## 2024-07-16 PROCEDURE — 99214 OFFICE O/P EST MOD 30 MIN: CPT | Performed by: FAMILY MEDICINE

## 2024-07-16 PROCEDURE — G0439 PPPS, SUBSEQ VISIT: HCPCS | Performed by: FAMILY MEDICINE

## 2024-07-16 PROCEDURE — 3078F DIAST BP <80 MM HG: CPT | Performed by: FAMILY MEDICINE

## 2024-07-16 PROCEDURE — 1123F ACP DISCUSS/DSCN MKR DOCD: CPT | Performed by: FAMILY MEDICINE

## 2024-07-16 PROCEDURE — 3044F HG A1C LEVEL LT 7.0%: CPT | Performed by: FAMILY MEDICINE

## 2024-07-16 PROCEDURE — 3074F SYST BP LT 130 MM HG: CPT | Performed by: FAMILY MEDICINE

## 2024-07-16 PROCEDURE — G0296 VISIT TO DETERM LDCT ELIG: HCPCS | Performed by: FAMILY MEDICINE

## 2024-07-16 RX ORDER — OLMESARTAN MEDOXOMIL 40 MG/1
TABLET ORAL
Qty: 90 TABLET | Refills: 1 | Status: SHIPPED | OUTPATIENT
Start: 2024-07-16

## 2024-07-16 RX ORDER — AMLODIPINE BESYLATE 5 MG/1
TABLET ORAL
Qty: 90 TABLET | Refills: 1 | Status: SHIPPED | OUTPATIENT
Start: 2024-07-16

## 2024-07-16 RX ORDER — ESCITALOPRAM OXALATE 10 MG/1
TABLET ORAL
Qty: 90 TABLET | Refills: 3 | Status: SHIPPED | OUTPATIENT
Start: 2024-07-16

## 2024-07-16 RX ORDER — MORPHINE SULFATE 15 MG/1
15 TABLET ORAL 3 TIMES DAILY PRN
Qty: 90 TABLET | Refills: 0 | Status: CANCELLED | OUTPATIENT
Start: 2024-07-16 | End: 2024-08-15

## 2024-07-16 RX ORDER — PROMETHAZINE HYDROCHLORIDE 25 MG/1
25 TABLET ORAL EVERY 6 HOURS PRN
Qty: 180 TABLET | Refills: 5 | Status: SHIPPED | OUTPATIENT
Start: 2024-07-16

## 2024-07-16 RX ORDER — METOPROLOL SUCCINATE 50 MG/1
TABLET, EXTENDED RELEASE ORAL
Qty: 90 TABLET | Refills: 1 | Status: SHIPPED | OUTPATIENT
Start: 2024-07-16

## 2024-07-16 RX ORDER — MONTELUKAST SODIUM 10 MG/1
10 TABLET ORAL DAILY
Qty: 90 TABLET | Refills: 3 | Status: SHIPPED | OUTPATIENT
Start: 2024-07-16

## 2024-07-16 RX ORDER — PANTOPRAZOLE SODIUM 40 MG/1
TABLET, DELAYED RELEASE ORAL
Qty: 180 TABLET | Refills: 1 | Status: SHIPPED | OUTPATIENT
Start: 2024-07-16

## 2024-07-16 RX ORDER — SUCRALFATE 1 G/1
1 TABLET ORAL 4 TIMES DAILY
COMMUNITY

## 2024-07-16 SDOH — ECONOMIC STABILITY: FOOD INSECURITY: WITHIN THE PAST 12 MONTHS, YOU WORRIED THAT YOUR FOOD WOULD RUN OUT BEFORE YOU GOT MONEY TO BUY MORE.: NEVER TRUE

## 2024-07-16 SDOH — ECONOMIC STABILITY: FOOD INSECURITY: WITHIN THE PAST 12 MONTHS, THE FOOD YOU BOUGHT JUST DIDN'T LAST AND YOU DIDN'T HAVE MONEY TO GET MORE.: NEVER TRUE

## 2024-07-16 SDOH — ECONOMIC STABILITY: INCOME INSECURITY: HOW HARD IS IT FOR YOU TO PAY FOR THE VERY BASICS LIKE FOOD, HOUSING, MEDICAL CARE, AND HEATING?: NOT HARD AT ALL

## 2024-07-16 ASSESSMENT — PATIENT HEALTH QUESTIONNAIRE - PHQ9
10. IF YOU CHECKED OFF ANY PROBLEMS, HOW DIFFICULT HAVE THESE PROBLEMS MADE IT FOR YOU TO DO YOUR WORK, TAKE CARE OF THINGS AT HOME, OR GET ALONG WITH OTHER PEOPLE: VERY DIFFICULT
SUM OF ALL RESPONSES TO PHQ QUESTIONS 1-9: 13
SUM OF ALL RESPONSES TO PHQ QUESTIONS 1-9: 13
9. THOUGHTS THAT YOU WOULD BE BETTER OFF DEAD, OR OF HURTING YOURSELF: NOT AT ALL
4. FEELING TIRED OR HAVING LITTLE ENERGY: NEARLY EVERY DAY
SUM OF ALL RESPONSES TO PHQ QUESTIONS 1-9: 13
6. FEELING BAD ABOUT YOURSELF - OR THAT YOU ARE A FAILURE OR HAVE LET YOURSELF OR YOUR FAMILY DOWN: SEVERAL DAYS
8. MOVING OR SPEAKING SO SLOWLY THAT OTHER PEOPLE COULD HAVE NOTICED. OR THE OPPOSITE, BEING SO FIGETY OR RESTLESS THAT YOU HAVE BEEN MOVING AROUND A LOT MORE THAN USUAL: SEVERAL DAYS
SUM OF ALL RESPONSES TO PHQ QUESTIONS 1-9: 13
7. TROUBLE CONCENTRATING ON THINGS, SUCH AS READING THE NEWSPAPER OR WATCHING TELEVISION: NEARLY EVERY DAY
5. POOR APPETITE OR OVEREATING: NOT AT ALL
SUM OF ALL RESPONSES TO PHQ9 QUESTIONS 1 & 2: 4
1. LITTLE INTEREST OR PLEASURE IN DOING THINGS: NEARLY EVERY DAY
2. FEELING DOWN, DEPRESSED OR HOPELESS: SEVERAL DAYS
3. TROUBLE FALLING OR STAYING ASLEEP: SEVERAL DAYS

## 2024-07-16 ASSESSMENT — ENCOUNTER SYMPTOMS
NAUSEA: 0
WHEEZING: 0
VOMITING: 0
COLOR CHANGE: 0
CONSTIPATION: 0
DIARRHEA: 0
BLURRED VISION: 0
BACK PAIN: 1
RHINORRHEA: 0
VISUAL CHANGE: 0
ABDOMINAL PAIN: 0
SHORTNESS OF BREATH: 0
COUGH: 0

## 2024-09-04 ENCOUNTER — PATIENT MESSAGE (OUTPATIENT)
Dept: FAMILY MEDICINE CLINIC | Facility: CLINIC | Age: 69
End: 2024-09-04

## 2024-09-04 DIAGNOSIS — M25.552 LEFT HIP PAIN: ICD-10-CM

## 2024-09-05 RX ORDER — MORPHINE SULFATE 15 MG/1
15 TABLET ORAL 3 TIMES DAILY PRN
Qty: 90 TABLET | Refills: 0 | Status: SHIPPED | OUTPATIENT
Start: 2024-09-05 | End: 2024-10-05

## 2024-09-05 RX ORDER — L-METHYLFOLATE-ALGAE-VIT B12-B6 CAP 3-90.314-2-35 MG 3-90.314-2-35 MG
1 CAP ORAL
COMMUNITY
Start: 2024-08-08

## 2024-09-05 RX ORDER — MORPHINE SULFATE 15 MG/1
15 TABLET ORAL 3 TIMES DAILY PRN
Qty: 90 TABLET | Refills: 0 | Status: SHIPPED | OUTPATIENT
Start: 2024-10-05 | End: 2024-11-04

## 2024-09-05 NOTE — TELEPHONE ENCOUNTER
Prescription (s) refilled  It (they) has been escribed to the pharmacy.    Requested Prescriptions     Signed Prescriptions Disp Refills    morphine (MSIR) 15 MG tablet 90 tablet 0     Sig: Take 1 tablet by mouth 3 times daily as needed for Pain for up to 30 days. Max Daily Amount: 45 mg     Authorizing Provider: TAB CALL    morphine (MSIR) 15 MG tablet 90 tablet 0     Sig: Take 1 tablet by mouth 3 times daily as needed for Pain for up to 30 days. Max Daily Amount: 45 mg     Authorizing Provider: TAB CALL     No orders of the defined types were placed in this encounter.          ICD-10-CM    1. Left hip pain  M25.552 morphine (MSIR) 15 MG tablet     morphine (MSIR) 15 MG tablet

## 2024-09-26 ENCOUNTER — HOSPITAL ENCOUNTER (OUTPATIENT)
Dept: MAMMOGRAPHY | Age: 69
Discharge: HOME | End: 2024-09-29
Attending: FAMILY MEDICINE

## 2024-09-26 DIAGNOSIS — Z12.31 ENCOUNTER FOR SCREENING MAMMOGRAM FOR BREAST CANCER: ICD-10-CM

## 2024-10-02 DIAGNOSIS — F33.1 MAJOR DEPRESSIVE DISORDER, RECURRENT EPISODE, MODERATE (HCC): ICD-10-CM

## 2024-10-02 RX ORDER — ESCITALOPRAM OXALATE 5 MG/1
TABLET ORAL
Qty: 30 TABLET | Refills: 5 | Status: SHIPPED | OUTPATIENT
Start: 2024-10-02

## 2024-10-11 ENCOUNTER — HOSPITAL ENCOUNTER (OUTPATIENT)
Dept: MAMMOGRAPHY | Age: 69
Discharge: HOME | End: 2024-10-14
Attending: FAMILY MEDICINE
Payer: MEDICARE

## 2024-10-11 PROCEDURE — 77063 BREAST TOMOSYNTHESIS BI: CPT

## 2024-10-16 ENCOUNTER — TELEPHONE (OUTPATIENT)
Dept: FAMILY MEDICINE CLINIC | Facility: CLINIC | Age: 69
End: 2024-10-16

## 2024-10-23 ENCOUNTER — TELEPHONE (OUTPATIENT)
Dept: FAMILY MEDICINE CLINIC | Facility: CLINIC | Age: 69
End: 2024-10-23

## 2024-10-23 NOTE — TELEPHONE ENCOUNTER
PT called stating that she has sores in her mouth and wanted an antibiotic to be called in. Pt informed that she will need to be seen.  Pt scheduled for 10/29 with Dr. Paul

## 2024-10-28 NOTE — PROGRESS NOTES
HISTORY OF PRESENT ILLNESS  Chief Complaint   Patient presents with    Dental Pain     Gums x 1 month (painful from dentures)    Headache     Congestion, coughing, facial pain, and body aches x 1 week      NOTICE FOR THE PATIENT: This clinical note is not designed to be interpreted by patients.  We do not recommend reading it unless you have medical training. These notes may contain candid and (unintentionally) offensive descriptions, which are sometimes required for accurate documentation. If you would like more information about your healthcare, please obtain it directly by myself or my staff/colleagues - never solely from the notes. Thank you for your understanding and cooperation.     Congestion, coughing, facial pain, and body aches x 1 week  Gets tired real easily    Pain Gums x 1 month (painful from dentures)  Feels like she cut her gum with the dentures  Lip felt like a fever blister.    Has been falling a lot.  Has a bump on her head.    Athletes foot in the right foot.    Not wanting to do anything.    Diabetes Mellitus: Patient presents for follow up of diabetes. Symptoms: taking medications as instructed, no medication side effects noted, no TIA's, no chest pain on exertion, no dyspnea on exertion, no swelling of ankles. Symptoms have well controlled.    Patient denies chest pain.  She states she is compliant most of the time with her  medications. She  states she is compliant most of the time with her  diet.  Evaluation to date has been included below.   Home sugars: symptomatic hypoglycemia does not occur. Treatment to date: medications.  Diabetic issues reviewed with her: low cholesterol diet, weight control and daily exercise discussed.  Diabetic Medications       Insulin       Insulin Degludec (TRESIBA FLEXTOUCH) 200 UNIT/ML SOPN INJECT 50 UNITS UNDER THE SKIN DAILY       Incretin Mimetic Agents       Tirzepatide (MOUNJARO) 7.5 MG/0.5ML SOPN SC injection Inject 0.5 mLs into the skin once a

## 2024-10-29 ENCOUNTER — OFFICE VISIT (OUTPATIENT)
Dept: FAMILY MEDICINE CLINIC | Facility: CLINIC | Age: 69
End: 2024-10-29

## 2024-10-29 VITALS
DIASTOLIC BLOOD PRESSURE: 58 MMHG | TEMPERATURE: 97.3 F | OXYGEN SATURATION: 95 % | HEIGHT: 60 IN | BODY MASS INDEX: 26.7 KG/M2 | WEIGHT: 136 LBS | HEART RATE: 77 BPM | RESPIRATION RATE: 16 BRPM | SYSTOLIC BLOOD PRESSURE: 115 MMHG

## 2024-10-29 DIAGNOSIS — E11.21 TYPE 2 DIABETES WITH NEPHROPATHY (HCC): ICD-10-CM

## 2024-10-29 DIAGNOSIS — I10 PRIMARY HYPERTENSION: ICD-10-CM

## 2024-10-29 DIAGNOSIS — K06.8 PAIN IN GUMS: ICD-10-CM

## 2024-10-29 DIAGNOSIS — U07.1 COVID-19: Primary | ICD-10-CM

## 2024-10-29 DIAGNOSIS — K27.9 PUD (PEPTIC ULCER DISEASE): ICD-10-CM

## 2024-10-29 DIAGNOSIS — R68.89 FLU-LIKE SYMPTOMS: ICD-10-CM

## 2024-10-29 DIAGNOSIS — R11.0 NAUSEA: ICD-10-CM

## 2024-10-29 DIAGNOSIS — M25.552 LEFT HIP PAIN: ICD-10-CM

## 2024-10-29 LAB
ANION GAP SERPL CALC-SCNC: 11 MMOL/L (ref 7–16)
BUN SERPL-MCNC: 17 MG/DL (ref 8–23)
CALCIUM SERPL-MCNC: 9.2 MG/DL (ref 8.8–10.2)
CHLORIDE SERPL-SCNC: 100 MMOL/L (ref 98–107)
CO2 SERPL-SCNC: 26 MMOL/L (ref 20–29)
CREAT SERPL-MCNC: 0.79 MG/DL (ref 0.6–1.1)
EST. AVERAGE GLUCOSE BLD GHB EST-MCNC: 130 MG/DL
EXP DATE SOLUTION: NORMAL
EXP DATE SWAB: NORMAL
EXPIRATION DATE: NORMAL
GLUCOSE SERPL-MCNC: 107 MG/DL (ref 70–99)
HBA1C MFR BLD: 6.1 % (ref 0–5.6)
INFLUENZA A ANTIGEN, POC: NEGATIVE
INFLUENZA B ANTIGEN, POC: NEGATIVE
LOT NUMBER POC: NORMAL
LOT NUMBER SOLUTION: NORMAL
LOT NUMBER SWAB: NORMAL
POTASSIUM SERPL-SCNC: 3.9 MMOL/L (ref 3.5–5.1)
SARS-COV-2 RNA, POC: POSITIVE
SODIUM SERPL-SCNC: 137 MMOL/L (ref 136–145)
VALID INTERNAL CONTROL, POC: YES

## 2024-10-29 RX ORDER — MORPHINE SULFATE 15 MG/1
15 TABLET ORAL 3 TIMES DAILY PRN
Qty: 90 TABLET | Refills: 0 | Status: SHIPPED | OUTPATIENT
Start: 2024-12-28 | End: 2025-01-27

## 2024-10-29 RX ORDER — AMLODIPINE BESYLATE 5 MG/1
TABLET ORAL
Qty: 90 TABLET | Refills: 1 | Status: SHIPPED | OUTPATIENT
Start: 2024-10-29

## 2024-10-29 RX ORDER — PROMETHAZINE HYDROCHLORIDE 25 MG/1
25 TABLET ORAL EVERY 6 HOURS PRN
Qty: 180 TABLET | Refills: 5 | Status: SHIPPED | OUTPATIENT
Start: 2024-10-29

## 2024-10-29 RX ORDER — SPIRONOLACTONE 25 MG/1
TABLET ORAL
Qty: 90 TABLET | Refills: 1 | Status: SHIPPED | OUTPATIENT
Start: 2024-10-29

## 2024-10-29 RX ORDER — LIDOCAINE HYDROCHLORIDE 20 MG/ML
15 SOLUTION OROPHARYNGEAL PRN
Qty: 100 ML | Refills: 2 | Status: SHIPPED | OUTPATIENT
Start: 2024-10-29

## 2024-10-29 RX ORDER — PANTOPRAZOLE SODIUM 40 MG/1
TABLET, DELAYED RELEASE ORAL
Qty: 180 TABLET | Refills: 1 | Status: SHIPPED | OUTPATIENT
Start: 2024-10-29

## 2024-10-29 RX ORDER — OLMESARTAN MEDOXOMIL 40 MG/1
TABLET ORAL
Qty: 90 TABLET | Refills: 1 | Status: SHIPPED | OUTPATIENT
Start: 2024-10-29

## 2024-10-29 RX ORDER — METOPROLOL SUCCINATE 50 MG/1
TABLET, EXTENDED RELEASE ORAL
Qty: 90 TABLET | Refills: 1 | Status: SHIPPED | OUTPATIENT
Start: 2024-10-29

## 2024-10-29 RX ORDER — MORPHINE SULFATE 15 MG/1
15 TABLET ORAL 3 TIMES DAILY PRN
Qty: 90 TABLET | Refills: 0 | Status: SHIPPED | OUTPATIENT
Start: 2024-10-29 | End: 2024-11-28

## 2024-10-29 RX ORDER — MORPHINE SULFATE 15 MG/1
15 TABLET ORAL 3 TIMES DAILY PRN
Qty: 90 TABLET | Refills: 0 | Status: SHIPPED | OUTPATIENT
Start: 2024-11-28 | End: 2024-12-28

## 2024-10-29 RX ORDER — CICLOPIROX 7.7 MG/G
GEL TOPICAL
COMMUNITY
Start: 2024-10-17

## 2024-10-30 ENCOUNTER — TELEPHONE (OUTPATIENT)
Dept: FAMILY MEDICINE CLINIC | Facility: CLINIC | Age: 69
End: 2024-10-30

## 2024-10-30 DIAGNOSIS — R05.1 ACUTE COUGH: Primary | ICD-10-CM

## 2024-10-30 RX ORDER — BENZONATATE 200 MG/1
200 CAPSULE ORAL 3 TIMES DAILY PRN
Qty: 30 CAPSULE | Refills: 0 | Status: SHIPPED | OUTPATIENT
Start: 2024-10-30 | End: 2024-11-09

## 2024-10-30 NOTE — TELEPHONE ENCOUNTER
Prescription (s) refilled  It (they) has been escribed to the pharmacy.    Requested Prescriptions     Signed Prescriptions Disp Refills    benzonatate (TESSALON) 200 MG capsule 30 capsule 0     Sig: Take 1 capsule by mouth 3 times daily as needed for Cough     Authorizing Provider: TAB CALL     No orders of the defined types were placed in this encounter.          ICD-10-CM    1. Acute cough  R05.1 benzonatate (TESSALON) 200 MG capsule

## 2024-11-01 ENCOUNTER — TELEPHONE (OUTPATIENT)
Dept: FAMILY MEDICINE CLINIC | Facility: CLINIC | Age: 69
End: 2024-11-01

## 2024-11-01 NOTE — TELEPHONE ENCOUNTER
Prior Authorization approval for Promethazine 25 mg received from [facility]. Placed in Dr. Paul's tray.

## 2024-11-06 NOTE — RESULT ENCOUNTER NOTE
Your blood sugar is 107.  Your A1c is good on your lab work. We like to keep it under 7.5 if at all possible.  That seems to be the safe zone.  You do not need a lab appointment on 11/12/2024 or an office visit on 11/19/2024.  We probably should see you every 3 months because of your pain medication.  Schedule an appointment around January 29 for an office visit and we can do your labs at that time as well.

## 2024-11-08 ENCOUNTER — TELEPHONE (OUTPATIENT)
Dept: FAMILY MEDICINE CLINIC | Facility: CLINIC | Age: 69
End: 2024-11-08

## 2024-11-12 ENCOUNTER — PATIENT MESSAGE (OUTPATIENT)
Dept: FAMILY MEDICINE CLINIC | Facility: CLINIC | Age: 69
End: 2024-11-12

## 2024-11-26 DIAGNOSIS — R05.1 ACUTE COUGH: ICD-10-CM

## 2024-11-26 RX ORDER — BENZONATATE 200 MG/1
200 CAPSULE ORAL 3 TIMES DAILY PRN
Qty: 30 CAPSULE | Refills: 0 | OUTPATIENT
Start: 2024-11-26 | End: 2024-12-06

## 2024-12-15 DIAGNOSIS — G89.29 CHRONIC BILATERAL LOW BACK PAIN WITH SCIATICA, SCIATICA LATERALITY UNSPECIFIED: ICD-10-CM

## 2024-12-15 DIAGNOSIS — M54.40 CHRONIC BILATERAL LOW BACK PAIN WITH SCIATICA, SCIATICA LATERALITY UNSPECIFIED: ICD-10-CM

## 2024-12-16 RX ORDER — CYCLOBENZAPRINE HCL 10 MG
10 TABLET ORAL 3 TIMES DAILY PRN
Qty: 90 TABLET | Refills: 5 | Status: SHIPPED | OUTPATIENT
Start: 2024-12-16

## 2024-12-18 ENCOUNTER — OFFICE VISIT (OUTPATIENT)
Dept: FAMILY MEDICINE CLINIC | Facility: CLINIC | Age: 69
End: 2024-12-18

## 2024-12-18 VITALS
DIASTOLIC BLOOD PRESSURE: 55 MMHG | HEIGHT: 60 IN | HEART RATE: 75 BPM | RESPIRATION RATE: 17 BRPM | SYSTOLIC BLOOD PRESSURE: 90 MMHG | BODY MASS INDEX: 26.11 KG/M2 | WEIGHT: 133 LBS | OXYGEN SATURATION: 97 % | TEMPERATURE: 97.2 F

## 2024-12-18 DIAGNOSIS — T14.8XXA: ICD-10-CM

## 2024-12-18 DIAGNOSIS — L08.9: ICD-10-CM

## 2024-12-18 DIAGNOSIS — E11.21 TYPE 2 DIABETES WITH NEPHROPATHY (HCC): ICD-10-CM

## 2024-12-18 DIAGNOSIS — L03.119 CELLULITIS OF ANTERIOR LOWER LEG: Primary | ICD-10-CM

## 2024-12-18 DIAGNOSIS — I10 PRIMARY HYPERTENSION: ICD-10-CM

## 2024-12-18 DIAGNOSIS — Z23 ENCOUNTER FOR IMMUNIZATION: ICD-10-CM

## 2024-12-18 DIAGNOSIS — M25.552 LEFT HIP PAIN: ICD-10-CM

## 2024-12-18 DIAGNOSIS — S81.811A LACERATION OF RIGHT LOWER LEG, INITIAL ENCOUNTER: ICD-10-CM

## 2024-12-18 DIAGNOSIS — E78.2 MIXED HYPERLIPIDEMIA: ICD-10-CM

## 2024-12-18 RX ORDER — MUPIROCIN 20 MG/G
OINTMENT TOPICAL
Qty: 30 G | Refills: 5 | Status: SHIPPED | OUTPATIENT
Start: 2024-12-18

## 2024-12-18 RX ORDER — MORPHINE SULFATE 15 MG/1
15 TABLET ORAL 3 TIMES DAILY PRN
Qty: 90 TABLET | Refills: 0 | Status: CANCELLED | OUTPATIENT
Start: 2024-12-18 | End: 2025-01-17

## 2024-12-18 RX ORDER — CEPHALEXIN 500 MG/1
500 CAPSULE ORAL 3 TIMES DAILY
Qty: 30 CAPSULE | Refills: 0 | Status: SHIPPED | OUTPATIENT
Start: 2024-12-18 | End: 2024-12-28

## 2024-12-18 RX ORDER — ROSUVASTATIN CALCIUM 20 MG/1
20 TABLET, COATED ORAL DAILY
Qty: 30 TABLET | Refills: 1 | Status: SHIPPED | OUTPATIENT
Start: 2024-12-18

## 2024-12-18 NOTE — PROGRESS NOTES
January 2025.          Her other chronic conditions are stable at this time.  She continues to be followed by her previous specialists for the above chronic issues.  She is stable on the current treatment and tolerating it well.  No significant sides effects.  Will not adjust therapy at this time, unless noted above.   We will continue to monitor for any problems.  Medications refilled and lab work has been ordered where needed.  Reviewed medications are explained including any potential interactions or side effects in detail. The patient's questions were answered.  She  understands the above and has no further questions.    Further workup and treatment should be done if symptoms persist, worsen or new symptoms occur. She  will call to notify us of any problems, complications or worsening symptoms.          The following additional handouts were provided to patient:    Wound Care Instructions    The patient (or guardian, if applicable) and other individuals in attendance with the patient were advised that Artificial Intelligence will be utilized during this visit to record, process the conversation to generate a clinical note, and support improvement of the AI technology. The patient (or guardian, if applicable) and other individuals in attendance at the appointment consented to the use of AI, including the recording.      (Some details in this note may have been created with speech-recognition software.  Some errors in speech recognition may have occurred.   Most of those have been corrected but some may have been missed.)         Lucina Paul MD  [facility] [address]  Phone [phone]

## 2025-01-12 DIAGNOSIS — J30.89 CHRONIC NONSEASONAL ALLERGIC RHINITIS DUE TO POLLEN: ICD-10-CM

## 2025-01-13 ENCOUNTER — TELEPHONE (OUTPATIENT)
Dept: FAMILY MEDICINE CLINIC | Facility: CLINIC | Age: 70
End: 2025-01-13

## 2025-01-13 RX ORDER — MONTELUKAST SODIUM 10 MG/1
10 TABLET ORAL DAILY
Qty: 90 TABLET | Refills: 3 | OUTPATIENT
Start: 2025-01-13

## 2025-01-13 NOTE — TELEPHONE ENCOUNTER
Patient fall going to the bathroom and bruised herself pretty bad. Patient is feeling out balance when she get up. Please advise.

## 2025-01-13 NOTE — TELEPHONE ENCOUNTER
Patient states that she fell Friday morning and wants to speak with Dr Paul's MA. States that she did not go to the ER.

## 2025-01-14 RX ORDER — MORPHINE SULFATE 15 MG/1
15 TABLET ORAL 3 TIMES DAILY PRN
Qty: 90 TABLET | Refills: 0 | Status: CANCELLED | OUTPATIENT
Start: 2025-01-14 | End: 2025-02-13

## 2025-01-15 ENCOUNTER — PATIENT MESSAGE (OUTPATIENT)
Dept: FAMILY MEDICINE CLINIC | Facility: CLINIC | Age: 70
End: 2025-01-15

## 2025-01-15 ENCOUNTER — OFFICE VISIT (OUTPATIENT)
Dept: FAMILY MEDICINE CLINIC | Facility: CLINIC | Age: 70
End: 2025-01-15

## 2025-01-15 VITALS
TEMPERATURE: 98 F | OXYGEN SATURATION: 98 % | HEIGHT: 60 IN | SYSTOLIC BLOOD PRESSURE: 125 MMHG | BODY MASS INDEX: 24.97 KG/M2 | DIASTOLIC BLOOD PRESSURE: 56 MMHG | WEIGHT: 127.19 LBS | HEART RATE: 74 BPM

## 2025-01-15 DIAGNOSIS — M25.522 LEFT ELBOW PAIN: Primary | ICD-10-CM

## 2025-01-15 DIAGNOSIS — Z91.81 AT HIGH RISK FOR FALLS: ICD-10-CM

## 2025-01-15 DIAGNOSIS — J44.9 CHRONIC OBSTRUCTIVE PULMONARY DISEASE, UNSPECIFIED COPD TYPE (HCC): ICD-10-CM

## 2025-01-15 DIAGNOSIS — F33.1 MAJOR DEPRESSIVE DISORDER, RECURRENT EPISODE, MODERATE (HCC): ICD-10-CM

## 2025-01-15 DIAGNOSIS — C18.9 MALIGNANT NEOPLASM OF COLON, UNSPECIFIED PART OF COLON (HCC): ICD-10-CM

## 2025-01-15 DIAGNOSIS — E11.21 TYPE 2 DIABETES WITH NEPHROPATHY (HCC): ICD-10-CM

## 2025-01-15 DIAGNOSIS — F11.20 CHRONIC NARCOTIC DEPENDENCE (HCC): ICD-10-CM

## 2025-01-15 DIAGNOSIS — M25.552 LEFT HIP PAIN: ICD-10-CM

## 2025-01-15 RX ORDER — MORPHINE SULFATE 15 MG/1
15 TABLET ORAL 3 TIMES DAILY PRN
Qty: 90 TABLET | Refills: 0 | Status: SHIPPED | OUTPATIENT
Start: 2025-01-27 | End: 2025-02-26

## 2025-01-15 RX ORDER — MORPHINE SULFATE 15 MG/1
15 TABLET ORAL 3 TIMES DAILY PRN
Qty: 90 TABLET | Refills: 0 | Status: SHIPPED | OUTPATIENT
Start: 2025-02-26 | End: 2025-03-28

## 2025-01-15 RX ORDER — ESCITALOPRAM OXALATE 20 MG/1
TABLET ORAL
Qty: 90 TABLET | Refills: 1 | Status: SHIPPED | OUTPATIENT
Start: 2025-01-15

## 2025-01-15 RX ORDER — MORPHINE SULFATE 15 MG/1
15 TABLET ORAL 3 TIMES DAILY PRN
Qty: 90 TABLET | Refills: 0 | Status: SHIPPED | OUTPATIENT
Start: 2025-03-28 | End: 2025-04-27

## 2025-01-15 RX ORDER — INSULIN DEGLUDEC 200 U/ML
INJECTION, SOLUTION SUBCUTANEOUS
Qty: 18 ML | Refills: 5 | Status: SHIPPED | OUTPATIENT
Start: 2025-01-15

## 2025-01-15 SDOH — ECONOMIC STABILITY: INCOME INSECURITY: IN THE LAST 12 MONTHS, WAS THERE A TIME WHEN YOU WERE NOT ABLE TO PAY THE MORTGAGE OR RENT ON TIME?: NO

## 2025-01-15 SDOH — ECONOMIC STABILITY: FOOD INSECURITY: WITHIN THE PAST 12 MONTHS, THE FOOD YOU BOUGHT JUST DIDN'T LAST AND YOU DIDN'T HAVE MONEY TO GET MORE.: PATIENT DECLINED

## 2025-01-15 SDOH — ECONOMIC STABILITY: FOOD INSECURITY: WITHIN THE PAST 12 MONTHS, YOU WORRIED THAT YOUR FOOD WOULD RUN OUT BEFORE YOU GOT MONEY TO BUY MORE.: PATIENT DECLINED

## 2025-01-15 SDOH — ECONOMIC STABILITY: TRANSPORTATION INSECURITY
IN THE PAST 12 MONTHS, HAS THE LACK OF TRANSPORTATION KEPT YOU FROM MEDICAL APPOINTMENTS OR FROM GETTING MEDICATIONS?: NO

## 2025-01-15 SDOH — ECONOMIC STABILITY: TRANSPORTATION INSECURITY
IN THE PAST 12 MONTHS, HAS LACK OF TRANSPORTATION KEPT YOU FROM MEETINGS, WORK, OR FROM GETTING THINGS NEEDED FOR DAILY LIVING?: NO

## 2025-01-15 ASSESSMENT — PATIENT HEALTH QUESTIONNAIRE - PHQ9
8. MOVING OR SPEAKING SO SLOWLY THAT OTHER PEOPLE COULD HAVE NOTICED. OR THE OPPOSITE - BEING SO FIDGETY OR RESTLESS THAT YOU HAVE BEEN MOVING AROUND A LOT MORE THAN USUAL: MORE THAN HALF THE DAYS
SUM OF ALL RESPONSES TO PHQ9 QUESTIONS 1 & 2: 2
9. THOUGHTS THAT YOU WOULD BE BETTER OFF DEAD, OR OF HURTING YOURSELF: NOT AT ALL
4. FEELING TIRED OR HAVING LITTLE ENERGY: NEARLY EVERY DAY
6. FEELING BAD ABOUT YOURSELF - OR THAT YOU ARE A FAILURE OR HAVE LET YOURSELF OR YOUR FAMILY DOWN: SEVERAL DAYS
8. MOVING OR SPEAKING SO SLOWLY THAT OTHER PEOPLE COULD HAVE NOTICED. OR THE OPPOSITE, BEING SO FIGETY OR RESTLESS THAT YOU HAVE BEEN MOVING AROUND A LOT MORE THAN USUAL: MORE THAN HALF THE DAYS
SUM OF ALL RESPONSES TO PHQ QUESTIONS 1-9: 11
7. TROUBLE CONCENTRATING ON THINGS, SUCH AS READING THE NEWSPAPER OR WATCHING TELEVISION: SEVERAL DAYS
SUM OF ALL RESPONSES TO PHQ QUESTIONS 1-9: 11
2. FEELING DOWN, DEPRESSED OR HOPELESS: SEVERAL DAYS
10. IF YOU CHECKED OFF ANY PROBLEMS, HOW DIFFICULT HAVE THESE PROBLEMS MADE IT FOR YOU TO DO YOUR WORK, TAKE CARE OF THINGS AT HOME, OR GET ALONG WITH OTHER PEOPLE: VERY DIFFICULT
SUM OF ALL RESPONSES TO PHQ QUESTIONS 1-9: 11
1. LITTLE INTEREST OR PLEASURE IN DOING THINGS: SEVERAL DAYS
SUM OF ALL RESPONSES TO PHQ QUESTIONS 1-9: 11
6. FEELING BAD ABOUT YOURSELF - OR THAT YOU ARE A FAILURE OR HAVE LET YOURSELF OR YOUR FAMILY DOWN: SEVERAL DAYS
4. FEELING TIRED OR HAVING LITTLE ENERGY: NEARLY EVERY DAY
1. LITTLE INTEREST OR PLEASURE IN DOING THINGS: SEVERAL DAYS
2. FEELING DOWN, DEPRESSED OR HOPELESS: SEVERAL DAYS
5. POOR APPETITE OR OVEREATING: SEVERAL DAYS
5. POOR APPETITE OR OVEREATING: SEVERAL DAYS
10. IF YOU CHECKED OFF ANY PROBLEMS, HOW DIFFICULT HAVE THESE PROBLEMS MADE IT FOR YOU TO DO YOUR WORK, TAKE CARE OF THINGS AT HOME, OR GET ALONG WITH OTHER PEOPLE: VERY DIFFICULT
3. TROUBLE FALLING OR STAYING ASLEEP: SEVERAL DAYS
SUM OF ALL RESPONSES TO PHQ QUESTIONS 1-9: 11
3. TROUBLE FALLING OR STAYING ASLEEP: SEVERAL DAYS
7. TROUBLE CONCENTRATING ON THINGS, SUCH AS READING THE NEWSPAPER OR WATCHING TELEVISION: SEVERAL DAYS
9. THOUGHTS THAT YOU WOULD BE BETTER OFF DEAD, OR OF HURTING YOURSELF: NOT AT ALL

## 2025-01-15 ASSESSMENT — ENCOUNTER SYMPTOMS
RHINORRHEA: 0
COLOR CHANGE: 0
VOMITING: 0
ABDOMINAL PAIN: 0
NAUSEA: 0
WHEEZING: 0
SHORTNESS OF BREATH: 0
CONSTIPATION: 0
BACK PAIN: 1
DIARRHEA: 0
COUGH: 0

## 2025-01-15 NOTE — PROGRESS NOTES
respiratory distress.      Breath sounds: Normal breath sounds. No wheezing or rales.   Musculoskeletal:      Left upper arm: Normal. No swelling, edema, deformity, tenderness or bony tenderness.      Left elbow: No swelling, deformity or effusion. Normal range of motion. Tenderness present in lateral epicondyle.      Left forearm: Normal. No swelling, edema, deformity, tenderness or bony tenderness.      Left wrist: Normal pulse.      Right lower leg: No edema.      Left lower leg: No edema.      Comments: Abrasion L elbow, lower R shin   Skin:     General: Skin is warm and dry.   Neurological:      General: No focal deficit present.      Mental Status: She is alert.   Psychiatric:         Mood and Affect: Mood normal.         Behavior: Behavior normal.         Thought Content: Thought content normal.         Judgment: Judgment normal.            Results  Laboratory Studies  Blood sugar was 60 yesterday and 70 this morning.     Scrape to R shin.  Tender below the area where the nail was right great toe.  Scrapes left elbow. Tender laterally.    ASSESSMENT and PLAN  1. Left elbow pain  -     XR ELBOW RIGHT (MIN 3 VIEWS); Future  2. Type 2 diabetes with nephropathy (HCC)  -     Insulin Degludec (TRESIBA FLEXTOUCH) 200 UNIT/ML SOPN; INJECT 28 UNITS UNDER THE SKIN DAILY, Disp-18 mL, R-5Normal  3. Left hip pain  -     morphine (MSIR) 15 MG tablet; Take 1 tablet by mouth 3 times daily as needed for Pain for up to 30 days. Max Daily Amount: 45 mg, Disp-90 tablet, R-0Normal  -     morphine (MSIR) 15 MG tablet; Take 1 tablet by mouth 3 times daily as needed for Pain for up to 30 days. Max Daily Amount: 45 mg, Disp-90 tablet, R-0Normal  -     morphine (MSIR) 15 MG tablet; Take 1 tablet by mouth 3 times daily as needed for Pain for up to 30 days. Max Daily Amount: 45 mg, Disp-90 tablet, R-0Normal  4. At high risk for falls  5. Chronic narcotic dependence (HCC)  6. Malignant neoplasm of colon, unspecified part of colon

## 2025-01-23 ENCOUNTER — TELEPHONE (OUTPATIENT)
Dept: FAMILY MEDICINE CLINIC | Facility: CLINIC | Age: 70
End: 2025-01-23

## 2025-01-23 DIAGNOSIS — L03.031 CELLULITIS OF TOE OF RIGHT FOOT: Primary | ICD-10-CM

## 2025-01-23 DIAGNOSIS — L60.0 INGROWN TOENAIL: ICD-10-CM

## 2025-01-23 RX ORDER — CEFADROXIL 500 MG/1
500 CAPSULE ORAL 2 TIMES DAILY
Qty: 20 CAPSULE | Refills: 0 | Status: SHIPPED | OUTPATIENT
Start: 2025-01-23 | End: 2025-02-02

## 2025-01-23 NOTE — TELEPHONE ENCOUNTER
Prescription (s) refilled  It (they) has been escribed to the pharmacy.    Requested Prescriptions     Signed Prescriptions Disp Refills    cefadroxil (DURICEF) 500 MG capsule 20 capsule 0     Sig: Take 1 capsule by mouth 2 times daily for 10 days     Authorizing Provider: TAB CALL     No orders of the defined types were placed in this encounter.          ICD-10-CM    1. Cellulitis of toe of right foot  L03.031 cefadroxil (DURICEF) 500 MG capsule      2. Ingrown toenail  L60.0 cefadroxil (DURICEF) 500 MG capsule

## 2025-01-23 NOTE — TELEPHONE ENCOUNTER
The big toe on the right foot is not getting better is very red and is swollen. It hurts on the tip of it. You looked at it when she was here on 1/15

## 2025-01-30 ENCOUNTER — PATIENT MESSAGE (OUTPATIENT)
Dept: FAMILY MEDICINE CLINIC | Facility: CLINIC | Age: 70
End: 2025-01-30

## 2025-02-12 DIAGNOSIS — E78.2 MIXED HYPERLIPIDEMIA: ICD-10-CM

## 2025-02-12 RX ORDER — ROSUVASTATIN CALCIUM 20 MG/1
20 TABLET, COATED ORAL DAILY
Qty: 30 TABLET | Refills: 2 | Status: SHIPPED | OUTPATIENT
Start: 2025-02-12

## 2025-03-06 ENCOUNTER — TELEPHONE (OUTPATIENT)
Dept: FAMILY MEDICINE CLINIC | Facility: CLINIC | Age: 70
End: 2025-03-06

## 2025-03-06 DIAGNOSIS — R05.1 ACUTE COUGH: Primary | ICD-10-CM

## 2025-03-06 RX ORDER — BENZONATATE 200 MG/1
200 CAPSULE ORAL 3 TIMES DAILY PRN
Qty: 30 CAPSULE | Refills: 0 | Status: SHIPPED | OUTPATIENT
Start: 2025-03-06 | End: 2025-03-16

## 2025-03-06 NOTE — TELEPHONE ENCOUNTER
Wants something called in for a cough may have the flu does not want to come in and she said she fell again

## 2025-03-06 NOTE — TELEPHONE ENCOUNTER
I have sent in a test for flu and COVID that she can do at home if she would like.  I have sent her some cough Perles that she can try.  She really should be seen if she is not improving.  Let us know if her test are positive.      Did she injure herself when she fell?    Prescription (s) refilled  It (they) has been escribed to the pharmacy.    Requested Prescriptions     Signed Prescriptions Disp Refills    Influenza-SARS At Home Test (COVID-19 FLU A&B 3-IN-1 TEST) KIT 2 kit 5     Si each by Other route as needed (illness)     Authorizing Provider: TAB CALL    benzonatate (TESSALON) 200 MG capsule 30 capsule 0     Sig: Take 1 capsule by mouth 3 times daily as needed for Cough     Authorizing Provider: TAB CALL     No orders of the defined types were placed in this encounter.          ICD-10-CM    1. Acute cough  R05.1 Influenza-SARS At Home Test (COVID-19 FLU A&B 3-IN-1 TEST) KIT     benzonatate (TESSALON) 200 MG capsule

## 2025-03-13 ENCOUNTER — OFFICE VISIT (OUTPATIENT)
Dept: FAMILY MEDICINE CLINIC | Facility: CLINIC | Age: 70
End: 2025-03-13
Payer: MEDICARE

## 2025-03-13 VITALS
SYSTOLIC BLOOD PRESSURE: 146 MMHG | TEMPERATURE: 97.3 F | HEART RATE: 89 BPM | BODY MASS INDEX: 26.15 KG/M2 | OXYGEN SATURATION: 98 % | DIASTOLIC BLOOD PRESSURE: 60 MMHG | HEIGHT: 60 IN | RESPIRATION RATE: 17 BRPM | WEIGHT: 133.2 LBS

## 2025-03-13 DIAGNOSIS — R06.09 DOE (DYSPNEA ON EXERTION): ICD-10-CM

## 2025-03-13 DIAGNOSIS — J44.9 CHRONIC OBSTRUCTIVE PULMONARY DISEASE, UNSPECIFIED COPD TYPE (HCC): ICD-10-CM

## 2025-03-13 DIAGNOSIS — M17.12 ARTHRITIS OF LEFT KNEE: ICD-10-CM

## 2025-03-13 DIAGNOSIS — J44.1 COPD WITH ACUTE EXACERBATION (HCC): Primary | ICD-10-CM

## 2025-03-13 PROCEDURE — 99214 OFFICE O/P EST MOD 30 MIN: CPT | Performed by: FAMILY MEDICINE

## 2025-03-13 PROCEDURE — 1159F MED LIST DOCD IN RCRD: CPT | Performed by: FAMILY MEDICINE

## 2025-03-13 PROCEDURE — 1160F RVW MEDS BY RX/DR IN RCRD: CPT | Performed by: FAMILY MEDICINE

## 2025-03-13 PROCEDURE — 1125F AMNT PAIN NOTED PAIN PRSNT: CPT | Performed by: FAMILY MEDICINE

## 2025-03-13 PROCEDURE — 3077F SYST BP >= 140 MM HG: CPT | Performed by: FAMILY MEDICINE

## 2025-03-13 PROCEDURE — 1123F ACP DISCUSS/DSCN MKR DOCD: CPT | Performed by: FAMILY MEDICINE

## 2025-03-13 PROCEDURE — 3078F DIAST BP <80 MM HG: CPT | Performed by: FAMILY MEDICINE

## 2025-03-13 RX ORDER — GUAIFENESIN 600 MG/1
TABLET, EXTENDED RELEASE ORAL
Qty: 30 TABLET | Refills: 2 | Status: SHIPPED | OUTPATIENT
Start: 2025-03-13

## 2025-03-13 RX ORDER — AMOXICILLIN 500 MG/1
CAPSULE ORAL
COMMUNITY
Start: 2025-02-18

## 2025-03-13 RX ORDER — ALBUTEROL SULFATE 90 UG/1
INHALANT RESPIRATORY (INHALATION)
Qty: 8.5 G | Refills: 3 | Status: SHIPPED | OUTPATIENT
Start: 2025-03-13

## 2025-03-13 RX ORDER — BUDESONIDE AND FORMOTEROL FUMARATE DIHYDRATE 160; 4.5 UG/1; UG/1
2 AEROSOL RESPIRATORY (INHALATION) 2 TIMES DAILY
Qty: 10.2 G | Refills: 3 | Status: SHIPPED | OUTPATIENT
Start: 2025-03-13

## 2025-03-13 RX ORDER — LEVOFLOXACIN 500 MG/1
500 TABLET, FILM COATED ORAL DAILY
Qty: 10 TABLET | Refills: 0 | Status: SHIPPED | OUTPATIENT
Start: 2025-03-13 | End: 2025-03-23

## 2025-03-13 NOTE — PROGRESS NOTES
HISTORY OF PRESENT ILLNESS  Chief Complaint   Patient presents with    Congestion     Chest congestion, SOB, cold all time     NOTICE FOR THE PATIENT: This clinical note is not designed to be interpreted by patients.  We do not recommend reading it unless you have medical training. These notes may contain candid and (unintentionally) offensive descriptions, which are sometimes required for accurate documentation. If you would like more information about your healthcare, please obtain it directly by myself or my staff/colleagues - never solely from the notes. Thank you for your understanding and cooperation.     Chest congestion, SOB, cold all time    Fell getting out of the bed    Used duricef and then amoxil.  Thick mucous  Chokes her at times.    History of Present Illness  The patient presents for evaluation of a persistent cough, fall, left hip pain, and elevated blood pressure.    She has been experiencing a persistent cough for the past month, which she believes may have originated from a dental visit where x-rays were performed. The cough is characterized by thick mucus production and an associated odor. She reports that the cough occasionally causes a sensation of obstruction in her throat, necessitating deep inhalation to facilitate exhalation, leading to episodes of choking and hacking. She also reports a sensation of something being lodged in her throat. She has been using a homeopathic nasal spray but has not taken Mucinex. She has been using cough syrup and Tessalon Perles. She reports that the cough sounds similar to when she was smoking. She has been attempting to increase her water intake. She has been using steam inhalation as recommended by her sister, which she finds beneficial. She does not have an inhaler and has not used one in a long time.    She reports a fall at 1:30 AM, during which she hit the side of her table, resulting in bruises but no bleeding. She remained on the floor for

## 2025-04-04 DIAGNOSIS — M25.552 LEFT HIP PAIN: ICD-10-CM

## 2025-04-04 RX ORDER — MORPHINE SULFATE 15 MG/1
15 TABLET ORAL 3 TIMES DAILY PRN
Qty: 90 TABLET | Refills: 0 | Status: CANCELLED | OUTPATIENT
Start: 2025-04-04 | End: 2025-05-04

## 2025-04-07 ENCOUNTER — PATIENT MESSAGE (OUTPATIENT)
Dept: FAMILY MEDICINE CLINIC | Facility: CLINIC | Age: 70
End: 2025-04-07

## 2025-04-07 RX ORDER — MORPHINE SULFATE 15 MG/1
15 TABLET ORAL 3 TIMES DAILY PRN
Qty: 90 TABLET | Refills: 0 | Status: SHIPPED | OUTPATIENT
Start: 2025-04-07 | End: 2025-05-07

## 2025-04-07 NOTE — TELEPHONE ENCOUNTER
Prescription (s) refilled  It (they) has been escribed to the pharmacy.    Requested Prescriptions     Signed Prescriptions Disp Refills    morphine (MSIR) 15 MG tablet 90 tablet 0     Sig: Take 1 tablet by mouth 3 times daily as needed for Pain for up to 30 days. Max Daily Amount: 45 mg     Authorizing Provider: TAB CALL     No orders of the defined types were placed in this encounter.          ICD-10-CM    1. Left hip pain  M25.552 morphine (MSIR) 15 MG tablet

## 2025-04-07 NOTE — TELEPHONE ENCOUNTER
Earliest fill date on pt's morphine rx is 3/28/25, but the expiration date is 3/16/25. Please correct and re-send to [facility] on [address] in [place].

## 2025-04-08 ENCOUNTER — TELEPHONE (OUTPATIENT)
Dept: FAMILY MEDICINE CLINIC | Facility: CLINIC | Age: 70
End: 2025-04-08

## 2025-04-08 NOTE — TELEPHONE ENCOUNTER
Fax received from [facility] that coverage under Medicare Part D has been denied for Promethazine. Placed in clinical staff tray in front office for Dr Paul.

## 2025-04-09 ENCOUNTER — TELEPHONE (OUTPATIENT)
Dept: FAMILY MEDICINE CLINIC | Facility: CLINIC | Age: 70
End: 2025-04-09

## 2025-04-09 DIAGNOSIS — K27.9 PUD (PEPTIC ULCER DISEASE): ICD-10-CM

## 2025-04-09 RX ORDER — SUCRALFATE 1 G/1
1 TABLET ORAL 4 TIMES DAILY
Qty: 120 TABLET | Refills: 3 | Status: SHIPPED | OUTPATIENT
Start: 2025-04-09

## 2025-04-09 NOTE — TELEPHONE ENCOUNTER
Pt calling needing a refill on sucralfate 1 gm, tablet. Pt was prescribed this in 2023. Asking if this can be sent to [facility] on [email or web address]. Pt is having stomach issues again.  Pain and throwing up again like previously.

## 2025-04-14 RX ORDER — ESCITALOPRAM OXALATE 10 MG/1
TABLET ORAL
Qty: 90 TABLET | Refills: 3 | Status: CANCELLED | OUTPATIENT
Start: 2025-04-14

## 2025-04-15 ENCOUNTER — OFFICE VISIT (OUTPATIENT)
Dept: FAMILY MEDICINE CLINIC | Facility: CLINIC | Age: 70
End: 2025-04-15
Payer: MEDICARE

## 2025-04-15 VITALS
TEMPERATURE: 97 F | OXYGEN SATURATION: 98 % | HEIGHT: 60 IN | SYSTOLIC BLOOD PRESSURE: 136 MMHG | BODY MASS INDEX: 26.9 KG/M2 | HEART RATE: 71 BPM | DIASTOLIC BLOOD PRESSURE: 57 MMHG | WEIGHT: 137 LBS

## 2025-04-15 DIAGNOSIS — R63.5 WEIGHT GAIN: ICD-10-CM

## 2025-04-15 DIAGNOSIS — E78.5 DYSLIPIDEMIA: ICD-10-CM

## 2025-04-15 DIAGNOSIS — N28.9 RENAL INSUFFICIENCY: ICD-10-CM

## 2025-04-15 DIAGNOSIS — E55.9 VITAMIN D DEFICIENCY: ICD-10-CM

## 2025-04-15 DIAGNOSIS — I10 PRIMARY HYPERTENSION: ICD-10-CM

## 2025-04-15 DIAGNOSIS — R80.9 MODERATELY INCREASED ALBUMINURIA: ICD-10-CM

## 2025-04-15 DIAGNOSIS — E11.22 TYPE 2 DIABETES MELLITUS WITH CHRONIC KIDNEY DISEASE, WITH LONG-TERM CURRENT USE OF INSULIN, UNSPECIFIED CKD STAGE (HCC): Primary | ICD-10-CM

## 2025-04-15 DIAGNOSIS — I10 ESSENTIAL HYPERTENSION WITH GOAL BLOOD PRESSURE LESS THAN 140/90: ICD-10-CM

## 2025-04-15 DIAGNOSIS — Z79.899 ENCOUNTER FOR LONG-TERM (CURRENT) USE OF MEDICATIONS: ICD-10-CM

## 2025-04-15 DIAGNOSIS — J44.9 CHRONIC OBSTRUCTIVE PULMONARY DISEASE, UNSPECIFIED COPD TYPE (HCC): ICD-10-CM

## 2025-04-15 DIAGNOSIS — Z79.4 TYPE 2 DIABETES MELLITUS WITH CHRONIC KIDNEY DISEASE, WITH LONG-TERM CURRENT USE OF INSULIN, UNSPECIFIED CKD STAGE (HCC): Primary | ICD-10-CM

## 2025-04-15 DIAGNOSIS — J30.89 CHRONIC NONSEASONAL ALLERGIC RHINITIS DUE TO POLLEN: ICD-10-CM

## 2025-04-15 DIAGNOSIS — K27.9 PUD (PEPTIC ULCER DISEASE): ICD-10-CM

## 2025-04-15 DIAGNOSIS — I10 ESSENTIAL HYPERTENSION: ICD-10-CM

## 2025-04-15 LAB
25(OH)D3 SERPL-MCNC: 36.8 NG/ML (ref 30–100)
ALBUMIN SERPL-MCNC: 3.5 G/DL (ref 3.2–4.6)
ALBUMIN/GLOB SERPL: 1.1 (ref 1–1.9)
ALP SERPL-CCNC: 70 U/L (ref 35–104)
ALT SERPL-CCNC: 28 U/L (ref 8–45)
ANION GAP SERPL CALC-SCNC: 9 MMOL/L (ref 7–16)
AST SERPL-CCNC: 29 U/L (ref 15–37)
BASOPHILS # BLD: 0.06 K/UL (ref 0–0.2)
BASOPHILS NFR BLD: 0.4 % (ref 0–2)
BILIRUB SERPL-MCNC: 0.3 MG/DL (ref 0–1.2)
BUN SERPL-MCNC: 12 MG/DL (ref 8–23)
CALCIUM SERPL-MCNC: 9.2 MG/DL (ref 8.8–10.2)
CHLORIDE SERPL-SCNC: 104 MMOL/L (ref 98–107)
CHOLEST SERPL-MCNC: 100 MG/DL (ref 0–200)
CO2 SERPL-SCNC: 26 MMOL/L (ref 20–29)
CREAT SERPL-MCNC: 0.95 MG/DL (ref 0.6–1.1)
CREAT UR-MCNC: 52.2 MG/DL (ref 28–217)
DIFFERENTIAL METHOD BLD: ABNORMAL
EOSINOPHIL # BLD: 0.35 K/UL (ref 0–0.8)
EOSINOPHIL NFR BLD: 2.6 % (ref 0.5–7.8)
ERYTHROCYTE [DISTWIDTH] IN BLOOD BY AUTOMATED COUNT: 13.3 % (ref 11.9–14.6)
EST. AVERAGE GLUCOSE BLD GHB EST-MCNC: 126 MG/DL
GLOBULIN SER CALC-MCNC: 3.3 G/DL (ref 2.3–3.5)
GLUCOSE SERPL-MCNC: 100 MG/DL (ref 70–99)
HBA1C MFR BLD: 6 % (ref 0–5.6)
HCT VFR BLD AUTO: 38.7 % (ref 35.8–46.3)
HDLC SERPL-MCNC: 50 MG/DL (ref 40–60)
HDLC SERPL: 2 (ref 0–5)
HGB BLD-MCNC: 12.2 G/DL (ref 11.7–15.4)
IMM GRANULOCYTES # BLD AUTO: 0.06 K/UL (ref 0–0.5)
IMM GRANULOCYTES NFR BLD AUTO: 0.4 % (ref 0–5)
LDLC SERPL CALC-MCNC: 20 MG/DL (ref 0–100)
LYMPHOCYTES # BLD: 4.55 K/UL (ref 0.5–4.6)
LYMPHOCYTES NFR BLD: 33.7 % (ref 13–44)
MAGNESIUM SERPL-MCNC: 1.9 MG/DL (ref 1.8–2.4)
MCH RBC QN AUTO: 29.5 PG (ref 26.1–32.9)
MCHC RBC AUTO-ENTMCNC: 31.5 G/DL (ref 31.4–35)
MCV RBC AUTO: 93.5 FL (ref 82–102)
MICROALBUMIN UR-MCNC: 2.05 MG/DL (ref 0–20)
MICROALBUMIN/CREAT UR-RTO: 39 MG/G (ref 0–30)
MONOCYTES # BLD: 0.82 K/UL (ref 0.1–1.3)
MONOCYTES NFR BLD: 6.1 % (ref 4–12)
NEUTS SEG # BLD: 7.66 K/UL (ref 1.7–8.2)
NEUTS SEG NFR BLD: 56.8 % (ref 43–78)
NRBC # BLD: 0 K/UL (ref 0–0.2)
PLATELET # BLD AUTO: 271 K/UL (ref 150–450)
PMV BLD AUTO: 8.4 FL (ref 9.4–12.3)
POTASSIUM SERPL-SCNC: 4.3 MMOL/L (ref 3.5–5.1)
PROT SERPL-MCNC: 6.9 G/DL (ref 6.3–8.2)
RBC # BLD AUTO: 4.14 M/UL (ref 4.05–5.2)
SODIUM SERPL-SCNC: 139 MMOL/L (ref 136–145)
TRIGL SERPL-MCNC: 147 MG/DL (ref 0–150)
TSH, 3RD GENERATION: 1.85 UIU/ML (ref 0.27–4.2)
VIT B12 SERPL-MCNC: 831 PG/ML (ref 193–986)
VLDLC SERPL CALC-MCNC: 29 MG/DL (ref 6–23)
WBC # BLD AUTO: 13.5 K/UL (ref 4.3–11.1)

## 2025-04-15 PROCEDURE — 1123F ACP DISCUSS/DSCN MKR DOCD: CPT | Performed by: FAMILY MEDICINE

## 2025-04-15 PROCEDURE — 3078F DIAST BP <80 MM HG: CPT | Performed by: FAMILY MEDICINE

## 2025-04-15 PROCEDURE — 1160F RVW MEDS BY RX/DR IN RCRD: CPT | Performed by: FAMILY MEDICINE

## 2025-04-15 PROCEDURE — 1159F MED LIST DOCD IN RCRD: CPT | Performed by: FAMILY MEDICINE

## 2025-04-15 PROCEDURE — 99214 OFFICE O/P EST MOD 30 MIN: CPT | Performed by: FAMILY MEDICINE

## 2025-04-15 PROCEDURE — G2211 COMPLEX E/M VISIT ADD ON: HCPCS | Performed by: FAMILY MEDICINE

## 2025-04-15 PROCEDURE — 3044F HG A1C LEVEL LT 7.0%: CPT | Performed by: FAMILY MEDICINE

## 2025-04-15 PROCEDURE — 3075F SYST BP GE 130 - 139MM HG: CPT | Performed by: FAMILY MEDICINE

## 2025-04-15 RX ORDER — SPIRONOLACTONE 25 MG/1
TABLET ORAL
Qty: 90 TABLET | Refills: 1 | Status: SHIPPED | OUTPATIENT
Start: 2025-04-15

## 2025-04-15 RX ORDER — MONTELUKAST SODIUM 10 MG/1
10 TABLET ORAL DAILY
Qty: 90 TABLET | Refills: 3 | Status: SHIPPED | OUTPATIENT
Start: 2025-04-15

## 2025-04-15 RX ORDER — AMLODIPINE BESYLATE 5 MG/1
TABLET ORAL
Qty: 90 TABLET | Refills: 1 | Status: SHIPPED | OUTPATIENT
Start: 2025-04-15

## 2025-04-15 RX ORDER — METOPROLOL SUCCINATE 50 MG/1
TABLET, EXTENDED RELEASE ORAL
Qty: 90 TABLET | Refills: 1 | Status: SHIPPED | OUTPATIENT
Start: 2025-04-15

## 2025-04-15 RX ORDER — PANTOPRAZOLE SODIUM 40 MG/1
TABLET, DELAYED RELEASE ORAL
Qty: 180 TABLET | Refills: 1 | Status: SHIPPED | OUTPATIENT
Start: 2025-04-15

## 2025-04-15 RX ORDER — OLMESARTAN MEDOXOMIL 40 MG/1
TABLET ORAL
Qty: 90 TABLET | Refills: 1 | Status: SHIPPED | OUTPATIENT
Start: 2025-04-15

## 2025-04-16 ENCOUNTER — RESULTS FOLLOW-UP (OUTPATIENT)
Dept: FAMILY MEDICINE CLINIC | Facility: CLINIC | Age: 70
End: 2025-04-16

## 2025-04-16 NOTE — RESULT ENCOUNTER NOTE
Your white blood cell count is slightly elevated.  That is a nonspecific finding.  Your urine shows very slight elevation of the microscopic protein in your urine.  That is much better.  Your A1c is in the prediabetic range.  Your cholesterol looks good.  Make sure you are eating a heart healthy nutritious diet, getting regular aerobic physical activity, maintaining desired appropriate body weight, and avoiding tobacco products.  Continue your current medications.  Other labs are normal.

## 2025-05-05 DIAGNOSIS — M25.552 LEFT HIP PAIN: ICD-10-CM

## 2025-05-05 NOTE — TELEPHONE ENCOUNTER
Pt called office, needs a refill sent in to [facility] on [address] in [place] on her Morphine 15 mg.  Current prescription runs out on the 8th. Please advise pt.

## 2025-05-06 RX ORDER — MORPHINE SULFATE 15 MG/1
15 TABLET ORAL 3 TIMES DAILY PRN
Qty: 90 TABLET | Refills: 0 | Status: SHIPPED | OUTPATIENT
Start: 2025-07-06 | End: 2025-08-05

## 2025-05-06 RX ORDER — MORPHINE SULFATE 15 MG/1
15 TABLET ORAL 3 TIMES DAILY PRN
Qty: 90 TABLET | Refills: 0 | Status: SHIPPED | OUTPATIENT
Start: 2025-06-06 | End: 2025-07-06

## 2025-05-06 RX ORDER — MORPHINE SULFATE 15 MG/1
15 TABLET ORAL 3 TIMES DAILY PRN
Qty: 90 TABLET | Refills: 0 | Status: SHIPPED | OUTPATIENT
Start: 2025-05-07 | End: 2025-06-06

## 2025-05-06 NOTE — TELEPHONE ENCOUNTER
Prescription (s) refilled  It (they) has been escribed to the pharmacy.    Requested Prescriptions     Signed Prescriptions Disp Refills    morphine (MSIR) 15 MG tablet 90 tablet 0     Sig: Take 1 tablet by mouth 3 times daily as needed for Pain for up to 30 days. Max Daily Amount: 45 mg     Authorizing Provider: TAB CALL    morphine (MSIR) 15 MG tablet 90 tablet 0     Sig: Take 1 tablet by mouth 3 times daily as needed for Pain for up to 30 days. Max Daily Amount: 45 mg     Authorizing Provider: TAB CALL    morphine (MSIR) 15 MG tablet 90 tablet 0     Sig: Take 1 tablet by mouth 3 times daily as needed for Pain for up to 30 days. Max Daily Amount: 45 mg     Authorizing Provider: TAB CALL     No orders of the defined types were placed in this encounter.          ICD-10-CM    1. Left hip pain  M25.552 morphine (MSIR) 15 MG tablet     morphine (MSIR) 15 MG tablet     morphine (MSIR) 15 MG tablet

## 2025-05-09 DIAGNOSIS — E78.2 MIXED HYPERLIPIDEMIA: ICD-10-CM

## 2025-05-09 RX ORDER — ROSUVASTATIN CALCIUM 20 MG/1
20 TABLET, COATED ORAL DAILY
Qty: 90 TABLET | Refills: 2 | Status: SHIPPED | OUTPATIENT
Start: 2025-05-09

## 2025-05-09 NOTE — TELEPHONE ENCOUNTER
Pt called, needs Olmesartan 10mg tablet and rosuvastatin 20mg tablets refilled and [facility] [place].

## 2025-05-09 NOTE — TELEPHONE ENCOUNTER
Prescription (s) refilled  It (they) has been escribed to the pharmacy.    Requested Prescriptions     Signed Prescriptions Disp Refills    rosuvastatin (CRESTOR) 20 MG tablet 90 tablet 2     Sig: TAKE ONE TABLET BY MOUTH EVERY DAY     Authorizing Provider: TAB CALL     No orders of the defined types were placed in this encounter.          ICD-10-CM    1. Mixed hyperlipidemia  E78.2 rosuvastatin (CRESTOR) 20 MG tablet

## 2025-06-02 ENCOUNTER — TELEPHONE (OUTPATIENT)
Dept: FAMILY MEDICINE CLINIC | Facility: CLINIC | Age: 70
End: 2025-06-02

## 2025-06-02 DIAGNOSIS — E11.21 TYPE 2 DIABETES WITH NEPHROPATHY (HCC): ICD-10-CM

## 2025-06-02 RX ORDER — INSULIN DEGLUDEC 200 U/ML
INJECTION, SOLUTION SUBCUTANEOUS
Qty: 18 ML | Refills: 5 | Status: SHIPPED | OUTPATIENT
Start: 2025-06-02

## 2025-06-02 NOTE — TELEPHONE ENCOUNTER
Sent in prescription for:     Requested Prescriptions     Signed Prescriptions Disp Refills    Insulin Degludec (TRESIBA FLEXTOUCH) 200 UNIT/ML SOPN 18 mL 5     Sig: INJECT 28 UNITS UNDER THE SKIN DAILY     Authorizing Provider: LYNN GALLO

## 2025-06-02 NOTE — TELEPHONE ENCOUNTER
Pt calling to get refill on her nsulin Degludec (TRESIBA FLEXTOUCH) 200 UNIT/ML SOPN. Pt states [facility] told her is has  in there system. Can a new rx be sent to [facility] on [address]. Pt is out can this be set to  on call. Next appt 25

## 2025-06-14 DIAGNOSIS — M54.42 CHRONIC BILATERAL LOW BACK PAIN WITH SCIATICA, SCIATICA LATERALITY UNSPECIFIED: ICD-10-CM

## 2025-06-14 DIAGNOSIS — G89.29 CHRONIC BILATERAL LOW BACK PAIN WITH SCIATICA, SCIATICA LATERALITY UNSPECIFIED: ICD-10-CM

## 2025-06-14 DIAGNOSIS — M54.41 CHRONIC BILATERAL LOW BACK PAIN WITH SCIATICA, SCIATICA LATERALITY UNSPECIFIED: ICD-10-CM

## 2025-06-17 DIAGNOSIS — G89.29 CHRONIC BILATERAL LOW BACK PAIN WITH SCIATICA, SCIATICA LATERALITY UNSPECIFIED: ICD-10-CM

## 2025-06-17 DIAGNOSIS — M54.41 CHRONIC BILATERAL LOW BACK PAIN WITH SCIATICA, SCIATICA LATERALITY UNSPECIFIED: ICD-10-CM

## 2025-06-17 DIAGNOSIS — M54.42 CHRONIC BILATERAL LOW BACK PAIN WITH SCIATICA, SCIATICA LATERALITY UNSPECIFIED: ICD-10-CM

## 2025-06-17 RX ORDER — CYCLOBENZAPRINE HCL 10 MG
10 TABLET ORAL 3 TIMES DAILY PRN
Qty: 90 TABLET | Refills: 5 | Status: SHIPPED | OUTPATIENT
Start: 2025-06-17

## 2025-06-17 RX ORDER — CYCLOBENZAPRINE HCL 10 MG
10 TABLET ORAL 3 TIMES DAILY PRN
Qty: 90 TABLET | Refills: 5 | OUTPATIENT
Start: 2025-06-17

## 2025-06-22 DIAGNOSIS — J44.9 CHRONIC OBSTRUCTIVE PULMONARY DISEASE, UNSPECIFIED COPD TYPE (HCC): ICD-10-CM

## 2025-06-23 RX ORDER — ALBUTEROL SULFATE 90 UG/1
2 INHALANT RESPIRATORY (INHALATION) EVERY 6 HOURS PRN
Qty: 8.5 G | Refills: 3 | OUTPATIENT
Start: 2025-06-23

## 2025-07-04 DIAGNOSIS — J44.1 COPD WITH ACUTE EXACERBATION (HCC): ICD-10-CM

## 2025-07-07 DIAGNOSIS — M25.552 LEFT HIP PAIN: ICD-10-CM

## 2025-07-07 RX ORDER — MORPHINE SULFATE 15 MG/1
15 TABLET ORAL 3 TIMES DAILY PRN
Qty: 90 TABLET | Refills: 0 | Status: SHIPPED | OUTPATIENT
Start: 2025-07-07 | End: 2025-08-06

## 2025-07-07 NOTE — TELEPHONE ENCOUNTER
Patient called about her Morphine, stating [facility] does not have supply for her medication, its on back order. Patient was wandering if we could transfer her Morphine to [facility] [address] in [place]. Please advise if needed at [phone]

## 2025-07-08 RX ORDER — BUDESONIDE AND FORMOTEROL FUMARATE DIHYDRATE 160; 4.5 UG/1; UG/1
AEROSOL RESPIRATORY (INHALATION)
Qty: 10.2 G | Refills: 1 | Status: SHIPPED | OUTPATIENT
Start: 2025-07-08

## 2025-07-11 DIAGNOSIS — F33.1 MAJOR DEPRESSIVE DISORDER, RECURRENT EPISODE, MODERATE (HCC): ICD-10-CM

## 2025-07-14 RX ORDER — ESCITALOPRAM OXALATE 20 MG/1
TABLET ORAL
Qty: 90 TABLET | Refills: 1 | Status: SHIPPED | OUTPATIENT
Start: 2025-07-14

## 2025-07-14 RX ORDER — ESCITALOPRAM OXALATE 10 MG/1
TABLET ORAL
Qty: 90 TABLET | Refills: 3 | OUTPATIENT
Start: 2025-07-14

## 2025-07-16 ENCOUNTER — TELEPHONE (OUTPATIENT)
Dept: FAMILY MEDICINE CLINIC | Facility: CLINIC | Age: 70
End: 2025-07-16

## 2025-07-16 ENCOUNTER — LAB (OUTPATIENT)
Dept: FAMILY MEDICINE CLINIC | Facility: CLINIC | Age: 70
End: 2025-07-16

## 2025-07-16 DIAGNOSIS — Z79.4 TYPE 2 DIABETES MELLITUS WITH CHRONIC KIDNEY DISEASE, WITH LONG-TERM CURRENT USE OF INSULIN, UNSPECIFIED CKD STAGE (HCC): ICD-10-CM

## 2025-07-16 DIAGNOSIS — D72.829 LEUKOCYTOSIS, UNSPECIFIED TYPE: Primary | ICD-10-CM

## 2025-07-16 DIAGNOSIS — E11.22 TYPE 2 DIABETES MELLITUS WITH CHRONIC KIDNEY DISEASE, WITH LONG-TERM CURRENT USE OF INSULIN, UNSPECIFIED CKD STAGE (HCC): ICD-10-CM

## 2025-07-16 DIAGNOSIS — D72.829 LEUKOCYTOSIS, UNSPECIFIED TYPE: ICD-10-CM

## 2025-07-16 LAB
BASOPHILS # BLD: 0.04 K/UL (ref 0–0.2)
BASOPHILS NFR BLD: 0.4 % (ref 0–2)
DIFFERENTIAL METHOD BLD: ABNORMAL
EOSINOPHIL # BLD: 0.16 K/UL (ref 0–0.8)
EOSINOPHIL NFR BLD: 1.8 % (ref 0.5–7.8)
ERYTHROCYTE [DISTWIDTH] IN BLOOD BY AUTOMATED COUNT: 13.5 % (ref 11.9–14.6)
EST. AVERAGE GLUCOSE BLD GHB EST-MCNC: 134 MG/DL
HBA1C MFR BLD: 6.3 % (ref 0–5.6)
HCT VFR BLD AUTO: 38.1 % (ref 35.8–46.3)
HGB BLD-MCNC: 12.1 G/DL (ref 11.7–15.4)
IMM GRANULOCYTES # BLD AUTO: 0.04 K/UL (ref 0–0.5)
IMM GRANULOCYTES NFR BLD AUTO: 0.4 % (ref 0–5)
LYMPHOCYTES # BLD: 2.11 K/UL (ref 0.5–4.6)
LYMPHOCYTES NFR BLD: 23.5 % (ref 13–44)
MCH RBC QN AUTO: 29.9 PG (ref 26.1–32.9)
MCHC RBC AUTO-ENTMCNC: 31.8 G/DL (ref 31.4–35)
MCV RBC AUTO: 94.1 FL (ref 82–102)
MONOCYTES # BLD: 0.54 K/UL (ref 0.1–1.3)
MONOCYTES NFR BLD: 6 % (ref 4–12)
NEUTS SEG # BLD: 6.08 K/UL (ref 1.7–8.2)
NEUTS SEG NFR BLD: 67.9 % (ref 43–78)
NRBC # BLD: 0 K/UL (ref 0–0.2)
PLATELET # BLD AUTO: 257 K/UL (ref 150–450)
PMV BLD AUTO: 8.6 FL (ref 9.4–12.3)
RBC # BLD AUTO: 4.05 M/UL (ref 4.05–5.2)
WBC # BLD AUTO: 9 K/UL (ref 4.3–11.1)

## 2025-07-19 SDOH — HEALTH STABILITY: PHYSICAL HEALTH: ON AVERAGE, HOW MANY MINUTES DO YOU ENGAGE IN EXERCISE AT THIS LEVEL?: 20 MIN

## 2025-07-19 SDOH — HEALTH STABILITY: PHYSICAL HEALTH: ON AVERAGE, HOW MANY DAYS PER WEEK DO YOU ENGAGE IN MODERATE TO STRENUOUS EXERCISE (LIKE A BRISK WALK)?: 2 DAYS

## 2025-07-19 ASSESSMENT — LIFESTYLE VARIABLES
HOW OFTEN DO YOU HAVE A DRINK CONTAINING ALCOHOL: NEVER
HOW MANY STANDARD DRINKS CONTAINING ALCOHOL DO YOU HAVE ON A TYPICAL DAY: PATIENT DOES NOT DRINK
HOW OFTEN DO YOU HAVE SIX OR MORE DRINKS ON ONE OCCASION: 1
HOW OFTEN DO YOU HAVE A DRINK CONTAINING ALCOHOL: 1
HOW MANY STANDARD DRINKS CONTAINING ALCOHOL DO YOU HAVE ON A TYPICAL DAY: 0

## 2025-07-19 ASSESSMENT — PATIENT HEALTH QUESTIONNAIRE - PHQ9
6. FEELING BAD ABOUT YOURSELF - OR THAT YOU ARE A FAILURE OR HAVE LET YOURSELF OR YOUR FAMILY DOWN: SEVERAL DAYS
8. MOVING OR SPEAKING SO SLOWLY THAT OTHER PEOPLE COULD HAVE NOTICED. OR THE OPPOSITE, BEING SO FIGETY OR RESTLESS THAT YOU HAVE BEEN MOVING AROUND A LOT MORE THAN USUAL: NEARLY EVERY DAY
1. LITTLE INTEREST OR PLEASURE IN DOING THINGS: NEARLY EVERY DAY
SUM OF ALL RESPONSES TO PHQ QUESTIONS 1-9: 18
2. FEELING DOWN, DEPRESSED OR HOPELESS: MORE THAN HALF THE DAYS
SUM OF ALL RESPONSES TO PHQ QUESTIONS 1-9: 18
4. FEELING TIRED OR HAVING LITTLE ENERGY: NEARLY EVERY DAY
7. TROUBLE CONCENTRATING ON THINGS, SUCH AS READING THE NEWSPAPER OR WATCHING TELEVISION: NEARLY EVERY DAY
3. TROUBLE FALLING OR STAYING ASLEEP: MORE THAN HALF THE DAYS
SUM OF ALL RESPONSES TO PHQ QUESTIONS 1-9: 18
5. POOR APPETITE OR OVEREATING: SEVERAL DAYS
9. THOUGHTS THAT YOU WOULD BE BETTER OFF DEAD, OR OF HURTING YOURSELF: NOT AT ALL
SUM OF ALL RESPONSES TO PHQ QUESTIONS 1-9: 18
10. IF YOU CHECKED OFF ANY PROBLEMS, HOW DIFFICULT HAVE THESE PROBLEMS MADE IT FOR YOU TO DO YOUR WORK, TAKE CARE OF THINGS AT HOME, OR GET ALONG WITH OTHER PEOPLE: EXTREMELY DIFFICULT

## 2025-07-22 ENCOUNTER — OFFICE VISIT (OUTPATIENT)
Dept: FAMILY MEDICINE CLINIC | Facility: CLINIC | Age: 70
End: 2025-07-22
Payer: MEDICARE

## 2025-07-22 VITALS
DIASTOLIC BLOOD PRESSURE: 54 MMHG | RESPIRATION RATE: 18 BRPM | TEMPERATURE: 98.1 F | BODY MASS INDEX: 27.68 KG/M2 | SYSTOLIC BLOOD PRESSURE: 134 MMHG | HEART RATE: 67 BPM | HEIGHT: 60 IN | OXYGEN SATURATION: 96 % | WEIGHT: 141 LBS

## 2025-07-22 DIAGNOSIS — J44.9 CHRONIC OBSTRUCTIVE PULMONARY DISEASE, UNSPECIFIED COPD TYPE (HCC): ICD-10-CM

## 2025-07-22 DIAGNOSIS — L60.0 INGROWN TOENAIL: ICD-10-CM

## 2025-07-22 DIAGNOSIS — F33.1 MAJOR DEPRESSIVE DISORDER, RECURRENT EPISODE, MODERATE (HCC): ICD-10-CM

## 2025-07-22 DIAGNOSIS — G89.29 CHRONIC BILATERAL LOW BACK PAIN, UNSPECIFIED WHETHER SCIATICA PRESENT: ICD-10-CM

## 2025-07-22 DIAGNOSIS — Z87.891 PERSONAL HISTORY OF TOBACCO USE: ICD-10-CM

## 2025-07-22 DIAGNOSIS — J44.1 COPD WITH ACUTE EXACERBATION (HCC): ICD-10-CM

## 2025-07-22 DIAGNOSIS — M25.552 LEFT HIP PAIN: ICD-10-CM

## 2025-07-22 DIAGNOSIS — Z12.11 SCREENING FOR MALIGNANT NEOPLASM OF COLON: ICD-10-CM

## 2025-07-22 DIAGNOSIS — R26.81 GAIT INSTABILITY: ICD-10-CM

## 2025-07-22 DIAGNOSIS — Z79.4 TYPE 2 DIABETES MELLITUS WITH CHRONIC KIDNEY DISEASE, WITH LONG-TERM CURRENT USE OF INSULIN, UNSPECIFIED CKD STAGE (HCC): ICD-10-CM

## 2025-07-22 DIAGNOSIS — L30.9 ECZEMA, UNSPECIFIED TYPE: ICD-10-CM

## 2025-07-22 DIAGNOSIS — Z00.00 MEDICARE ANNUAL WELLNESS VISIT, SUBSEQUENT: Primary | ICD-10-CM

## 2025-07-22 DIAGNOSIS — E11.22 TYPE 2 DIABETES MELLITUS WITH CHRONIC KIDNEY DISEASE, WITH LONG-TERM CURRENT USE OF INSULIN, UNSPECIFIED CKD STAGE (HCC): ICD-10-CM

## 2025-07-22 DIAGNOSIS — D72.829 LEUKOCYTOSIS, UNSPECIFIED TYPE: ICD-10-CM

## 2025-07-22 DIAGNOSIS — I10 PRIMARY HYPERTENSION: ICD-10-CM

## 2025-07-22 DIAGNOSIS — M54.50 CHRONIC BILATERAL LOW BACK PAIN, UNSPECIFIED WHETHER SCIATICA PRESENT: ICD-10-CM

## 2025-07-22 DIAGNOSIS — R29.6 RECURRENT FALLS: ICD-10-CM

## 2025-07-22 DIAGNOSIS — M54.2 NECK PAIN: ICD-10-CM

## 2025-07-22 DIAGNOSIS — E78.2 MIXED HYPERLIPIDEMIA: ICD-10-CM

## 2025-07-22 DIAGNOSIS — L03.031 CELLULITIS OF TOE OF RIGHT FOOT: ICD-10-CM

## 2025-07-22 PROCEDURE — 1123F ACP DISCUSS/DSCN MKR DOCD: CPT | Performed by: FAMILY MEDICINE

## 2025-07-22 PROCEDURE — 99214 OFFICE O/P EST MOD 30 MIN: CPT | Performed by: FAMILY MEDICINE

## 2025-07-22 PROCEDURE — G0439 PPPS, SUBSEQ VISIT: HCPCS | Performed by: FAMILY MEDICINE

## 2025-07-22 PROCEDURE — 1125F AMNT PAIN NOTED PAIN PRSNT: CPT | Performed by: FAMILY MEDICINE

## 2025-07-22 PROCEDURE — 3075F SYST BP GE 130 - 139MM HG: CPT | Performed by: FAMILY MEDICINE

## 2025-07-22 PROCEDURE — 1159F MED LIST DOCD IN RCRD: CPT | Performed by: FAMILY MEDICINE

## 2025-07-22 PROCEDURE — 1160F RVW MEDS BY RX/DR IN RCRD: CPT | Performed by: FAMILY MEDICINE

## 2025-07-22 PROCEDURE — 3078F DIAST BP <80 MM HG: CPT | Performed by: FAMILY MEDICINE

## 2025-07-22 PROCEDURE — G0296 VISIT TO DETERM LDCT ELIG: HCPCS | Performed by: FAMILY MEDICINE

## 2025-07-22 PROCEDURE — 3044F HG A1C LEVEL LT 7.0%: CPT | Performed by: FAMILY MEDICINE

## 2025-07-22 PROCEDURE — G2211 COMPLEX E/M VISIT ADD ON: HCPCS | Performed by: FAMILY MEDICINE

## 2025-07-22 RX ORDER — CALCIUM CARBONATE 160(400)MG
TABLET,CHEWABLE ORAL
Qty: 1 EACH | Refills: 0 | Status: SHIPPED | OUTPATIENT
Start: 2025-07-22

## 2025-07-22 RX ORDER — CEFADROXIL 500 MG/1
500 CAPSULE ORAL 2 TIMES DAILY
Qty: 20 CAPSULE | Refills: 0 | Status: SHIPPED | OUTPATIENT
Start: 2025-07-22 | End: 2025-08-01

## 2025-07-22 RX ORDER — FLUOCINONIDE 0.5 MG/G
CREAM TOPICAL
Qty: 60 G | Refills: 2 | Status: SHIPPED | OUTPATIENT
Start: 2025-07-22

## 2025-07-22 RX ORDER — BUPROPION HYDROCHLORIDE 150 MG/1
150 TABLET ORAL EVERY MORNING
Qty: 90 TABLET | Refills: 3 | Status: SHIPPED | OUTPATIENT
Start: 2025-07-22

## 2025-07-22 NOTE — PROGRESS NOTES
currently on Tresiba 28 units. She notes that her blood sugar levels increase when she consumes popcorn and cocoa butter before bedtime. She has an upcoming appointment for Thiago.    She is under the care of a cardiologist for her heart condition.    She is currently on Lexapro 20 mg for depression but feels it is not effective. She has previously tried Wellbutrin and is open to trying it again. She expresses a lack of interest in socializing and housework.    She has a history of COPD and has recently developed a cough. She also reports a scratchy voice that worsens throughout the day. She experiences difficulty breathing out and uses nasal saline for her dry and runny nose.    She experiences tremors, particularly in one arm, which have worsened over time. This has led to instances of self-injury, such as poking herself with an insulin needle. She also reports numbness in her elbow, which Dr. Camacho attributed to nerve damage from a fall on the porch.    She has been experiencing pain in her right foot, which is less swollen than before. Her nails have been growing abnormally, making walking difficult. She has been using hydrocortisone cream and lotion for relief. She has been using castor oil and coconut oil for seborrheic keratosis on her face, which has been effective. She received a pair of diabetic shoes last year, but they cause cramping in her feet. She has been wearing flip-flops instead, which do not compress her toes and separate the bunion from the hammertoes. She has been unable to reach her feet to cut her nails or put on pajamas, leading to falls. She has used a cane in the past but found it unhelpful.    She has experienced falls due to balance issues, including one on 04/15/2025. She has difficulty navigating small steps, such as those on her porch. She has been advised to get a rollator for mobility support. She experiences back pain and muscle tightness after falls. She has been using morphine

## 2025-07-28 ASSESSMENT — ENCOUNTER SYMPTOMS
WHEEZING: 0
VOMITING: 0
NAUSEA: 0
DIARRHEA: 0
SHORTNESS OF BREATH: 0
RHINORRHEA: 0
CONSTIPATION: 0
COUGH: 0
ABDOMINAL PAIN: 0

## 2025-08-06 ENCOUNTER — TELEPHONE (OUTPATIENT)
Dept: FAMILY MEDICINE CLINIC | Facility: CLINIC | Age: 70
End: 2025-08-06

## 2025-08-06 DIAGNOSIS — M54.41 CHRONIC BILATERAL LOW BACK PAIN WITH SCIATICA, SCIATICA LATERALITY UNSPECIFIED: Primary | ICD-10-CM

## 2025-08-06 DIAGNOSIS — M54.42 CHRONIC BILATERAL LOW BACK PAIN WITH SCIATICA, SCIATICA LATERALITY UNSPECIFIED: Primary | ICD-10-CM

## 2025-08-06 DIAGNOSIS — G89.29 CHRONIC BILATERAL LOW BACK PAIN WITH SCIATICA, SCIATICA LATERALITY UNSPECIFIED: Primary | ICD-10-CM

## 2025-08-06 DIAGNOSIS — M17.12 ARTHRITIS OF LEFT KNEE: ICD-10-CM

## 2025-08-06 DIAGNOSIS — G89.4 CHRONIC PAIN SYNDROME: ICD-10-CM

## 2025-08-06 RX ORDER — HYDROMORPHONE HYDROCHLORIDE 8 MG/1
8 TABLET ORAL EVERY 6 HOURS PRN
Qty: 42 TABLET | Refills: 0 | Status: SHIPPED | OUTPATIENT
Start: 2025-08-06 | End: 2025-08-20

## 2025-08-25 ENCOUNTER — TELEPHONE (OUTPATIENT)
Dept: FAMILY MEDICINE CLINIC | Facility: CLINIC | Age: 70
End: 2025-08-25

## 2025-08-25 DIAGNOSIS — M54.41 CHRONIC BILATERAL LOW BACK PAIN WITH SCIATICA, SCIATICA LATERALITY UNSPECIFIED: ICD-10-CM

## 2025-08-25 DIAGNOSIS — G89.29 CHRONIC BILATERAL LOW BACK PAIN WITH SCIATICA, SCIATICA LATERALITY UNSPECIFIED: ICD-10-CM

## 2025-08-25 DIAGNOSIS — M54.42 CHRONIC BILATERAL LOW BACK PAIN WITH SCIATICA, SCIATICA LATERALITY UNSPECIFIED: ICD-10-CM

## 2025-08-25 DIAGNOSIS — M17.12 ARTHRITIS OF LEFT KNEE: ICD-10-CM

## 2025-08-25 RX ORDER — HYDROMORPHONE HYDROCHLORIDE 8 MG/1
8 TABLET ORAL EVERY 6 HOURS PRN
Qty: 84 TABLET | Refills: 0 | Status: SHIPPED | OUTPATIENT
Start: 2025-08-25 | End: 2025-09-22

## (undated) DEVICE — DRAPE,TOP,102X53,STERILE: Brand: MEDLINE

## (undated) DEVICE — STRYKER PERFORMANCE SERIES SAGITTAL BLADE: Brand: STRYKER PERFORMANCE SERIES

## (undated) DEVICE — 2000CC GUARDIAN II: Brand: GUARDIAN

## (undated) DEVICE — BANDAGE COMPR SELF ADH 5 YDX4 IN TAN STRL PREMIERPRO LF

## (undated) DEVICE — BUTTON SWITCH PENCIL BLADE ELECTRODE, HOLSTER: Brand: EDGE

## (undated) DEVICE — TRAY PREP DRY W/ PREM GLV 2 APPL 6 SPNG 2 UNDPD 1 OVERWRAP

## (undated) DEVICE — UTILITY MARKER,BLACK WITH LABELS: Brand: DEVON

## (undated) DEVICE — SUTURE MCRYL SZ 2-0 L27IN ABSRB UD CP-1 1 L36MM 1/2 CIR REV Y266H

## (undated) DEVICE — GOWN,REINF,POLY,ECL,PP SLV,XL: Brand: MEDLINE

## (undated) DEVICE — SUTURE PDS II SZ 1 L96IN ABSRB VLT TP-1 L65MM 1/2 CIR Z880G

## (undated) DEVICE — (D)PREP SKN CHLRAPRP APPL 26ML -- CONVERT TO ITEM 371833

## (undated) DEVICE — NEEDLE HYPO 21GA L1.5IN INTRAMUSCULAR S STL LATCH BVL UP

## (undated) DEVICE — SPONGE LAP 18X18IN STRL -- 5/PK

## (undated) DEVICE — BLADE SAW PAT RMR PILT H 38MM --

## (undated) DEVICE — CURETTE BNE CEM 10IN DISP --

## (undated) DEVICE — SLIM BODY SKIN STAPLER: Brand: APPOSE ULC

## (undated) DEVICE — Z DISCONTINUED PER MEDLINE USE 2741944 DRESSING AQUACEL 12 IN SURG W9XL30CM SIL CVR WTRPRF VIR BACT BARR ANTIMIC

## (undated) DEVICE — GARMENT,MEDLINE,DVT,INT,CALF,MED, GEN2: Brand: MEDLINE

## (undated) DEVICE — SUTURE VCRL SZ 1 L27IN ABSRB UD L36MM CP-1 1/2 CIR REV CUT J268H

## (undated) DEVICE — CARDINAL HEALTH FLEXIBLE LIGHT HANDLE COVER: Brand: CARDINAL HEALTH

## (undated) DEVICE — STOCKINETTE TUBE 9X48 -- MEDICHOICE

## (undated) DEVICE — SYR 50ML LR LCK 1ML GRAD NSAF --

## (undated) DEVICE — SOLUTION IV 1000ML 0.9% SOD CHL

## (undated) DEVICE — T4 HOOD

## (undated) DEVICE — BIPOLAR SEALER 23-112-1 AQM 6.0: Brand: AQUAMANTYS ®

## (undated) DEVICE — 3000CC GUARDIAN II: Brand: GUARDIAN

## (undated) DEVICE — TRAY CATH 16F DRN BG LTX -- CONVERT TO ITEM 363158

## (undated) DEVICE — MEDI-VAC YANKAUER SUCTION HANDLE W/BULBOUS TIP: Brand: CARDINAL HEALTH

## (undated) DEVICE — REM POLYHESIVE ADULT PATIENT RETURN ELECTRODE: Brand: VALLEYLAB

## (undated) DEVICE — HANDPIECE SET WITH COAXIAL HIGH FLOW TIP AND SUCTION TUBE: Brand: INTERPULSE

## (undated) DEVICE — NEEDLE HYPO 18GA L1.5IN PNK S STL HUB POLYPR SHLD REG BVL

## (undated) DEVICE — SYR LR LCK 1ML GRAD NSAF 30ML --

## (undated) DEVICE — PACK PROCEDURE SURG TOT KNEE

## (undated) DEVICE — CATHETER URETH 16FR BLLN 5CC L16IN SIL F INDWL 2 W SHT LEN

## (undated) DEVICE — SOLUTION IV 500ML 0.9% SOD CHL FLX CONT

## (undated) DEVICE — SOLUTION IRRIG 3000ML 0.9% SOD CHL FLX CONT 0797208] ICU MEDICAL INC]